# Patient Record
Sex: FEMALE | Race: WHITE | NOT HISPANIC OR LATINO | Employment: UNEMPLOYED | URBAN - METROPOLITAN AREA
[De-identification: names, ages, dates, MRNs, and addresses within clinical notes are randomized per-mention and may not be internally consistent; named-entity substitution may affect disease eponyms.]

---

## 2017-07-03 ENCOUNTER — OFFICE VISIT (OUTPATIENT)
Dept: FAMILY MEDICINE CLINIC | Facility: CLINIC | Age: 27
End: 2017-07-03

## 2017-07-03 VITALS
RESPIRATION RATE: 16 BRPM | HEIGHT: 62 IN | DIASTOLIC BLOOD PRESSURE: 82 MMHG | WEIGHT: 160 LBS | OXYGEN SATURATION: 99 % | BODY MASS INDEX: 29.44 KG/M2 | HEART RATE: 112 BPM | SYSTOLIC BLOOD PRESSURE: 118 MMHG

## 2017-07-03 DIAGNOSIS — K13.0 INFECTION OF LIP: Primary | ICD-10-CM

## 2017-07-03 PROCEDURE — 99213 OFFICE O/P EST LOW 20 MIN: CPT | Performed by: FAMILY MEDICINE

## 2017-07-03 NOTE — PROGRESS NOTES
"Subjective   Lorena Keen is a 27 y.o. female.     Oral Swelling   This is a recurrent (Lower Lip up occasionally swells up and occasionally draining some pus) problem. The current episode started more than 1 month ago. The problem occurs intermittently. The problem has been unchanged. Pertinent negatives include no chest pain, chills, congestion, coughing, fever, nausea, sore throat, swollen glands or vomiting. Exacerbated by: Recently had some wisdom teeth extracted and was told her other teeth are healthy. Treatments tried: vaseline lip balm. The treatment provided mild relief.        The following portions of the patient's history were reviewed and updated as appropriate: allergies, current medications, past social history and problem list.    Review of Systems   Constitutional: Negative for chills and fever.   HENT: Positive for facial swelling. Negative for congestion, mouth sores, nosebleeds, postnasal drip, sore throat, trouble swallowing and voice change.         No mucocele or openings of the Ted up or in the corner of the mouth were appreciated   Respiratory: Negative for cough and shortness of breath.    Cardiovascular: Negative for chest pain and palpitations.   Gastrointestinal: Negative for diarrhea, nausea and vomiting.   Skin: Negative.        Objective   /82  Pulse 112  Resp 16  Ht 62\" (157.5 cm)  Wt 160 lb (72.6 kg)  SpO2 99%  BMI 29.26 kg/m2  Physical Exam   Constitutional: She appears well-developed and well-nourished.   HENT:   Head: Atraumatic.   Nose: Nose normal.   Mouth/Throat: Oropharynx is clear and moist. No oropharyngeal exudate.   Careful evaluation of the lower lip revealed no discernible abnormality   Eyes: Conjunctivae are normal. Pupils are equal, round, and reactive to light.   Neck: Neck supple.   Cardiovascular: Normal rate, regular rhythm and normal heart sounds.    Pulmonary/Chest: Effort normal.   Lymphadenopathy:     She has no cervical adenopathy.   Nursing " note and vitals reviewed.      Assessment/Plan   Problem List Items Addressed This Visit     None      Visit Diagnoses     Infection of lip    -  Primary          New Medications Ordered This Visit   Medications   • mupirocin (BACTROBAN) 2 % ointment     Sig: Apply  topically 3 (Three) Times a Day.     Dispense:  30 g     Refill:  0   Trial of a topical antibiotic for 2 weeks if no recurrence continue to use it as directed.  If the lip swells up and drains again I would like her to come in that day for an evaluation.

## 2017-11-08 ENCOUNTER — OFFICE VISIT (OUTPATIENT)
Dept: FAMILY MEDICINE CLINIC | Facility: CLINIC | Age: 27
End: 2017-11-08

## 2017-11-08 VITALS
TEMPERATURE: 99.1 F | HEIGHT: 62 IN | OXYGEN SATURATION: 99 % | HEART RATE: 108 BPM | SYSTOLIC BLOOD PRESSURE: 104 MMHG | DIASTOLIC BLOOD PRESSURE: 72 MMHG | BODY MASS INDEX: 29.63 KG/M2 | WEIGHT: 161 LBS

## 2017-11-08 DIAGNOSIS — Z72.0 TOBACCO ABUSE: ICD-10-CM

## 2017-11-08 DIAGNOSIS — S29.019A THORACIC MYOFASCIAL STRAIN, INITIAL ENCOUNTER: Primary | ICD-10-CM

## 2017-11-08 PROCEDURE — 99406 BEHAV CHNG SMOKING 3-10 MIN: CPT | Performed by: FAMILY MEDICINE

## 2017-11-08 PROCEDURE — 99213 OFFICE O/P EST LOW 20 MIN: CPT | Performed by: FAMILY MEDICINE

## 2017-11-08 RX ORDER — PREDNISONE 20 MG/1
20 TABLET ORAL 2 TIMES DAILY
Qty: 10 TABLET | Refills: 0 | OUTPATIENT
Start: 2017-11-08 | End: 2020-01-26

## 2017-11-08 RX ORDER — CYCLOBENZAPRINE HCL 10 MG
10 TABLET ORAL 3 TIMES DAILY PRN
Qty: 30 TABLET | Refills: 0 | OUTPATIENT
Start: 2017-11-08 | End: 2020-01-26

## 2017-11-08 NOTE — PROGRESS NOTES
Subjective   Lorena Keen is a 27 y.o. female.  Pt is here due to upper back pain between shoulder blades sharp pain comes when taking a deep breath mostly on left side. Pain with any movement.   Pain 7/10    Back Pain   This is a new problem. The current episode started in the past 7 days. The problem occurs constantly. The problem has been waxing and waning since onset. The pain is present in the thoracic spine. The quality of the pain is described as aching and burning. The pain does not radiate. The pain is at a severity of 5/10. The pain is mild. The pain is worse during the day. The symptoms are aggravated by bending, coughing, lying down, position and twisting. Stiffness is present at night. Pertinent negatives include no abdominal pain, bladder incontinence, bowel incontinence, chest pain, dysuria, fever, headaches, leg pain, numbness, paresis, paresthesias, pelvic pain, perianal numbness, tingling, weakness or weight loss. Risk factors include recent trauma. She has tried NSAIDs for the symptoms. The treatment provided mild relief.    pain started upon lifting a cart.  Left side medial shoulder pain.  No numbness or weakness.  Nothing alleviates pain.  Worse with movement.  7/10 pain.  No soa.  No cough.      The following portions of the patient's history were reviewed and updated as appropriate: allergies, current medications, past family history, past medical history, past social history, past surgical history and problem list.    Review of Systems   Constitutional: Negative for fever and weight loss.   Cardiovascular: Negative for chest pain.   Gastrointestinal: Negative for abdominal pain and bowel incontinence.   Genitourinary: Negative for bladder incontinence, dysuria and pelvic pain.   Musculoskeletal: Positive for back pain.   Neurological: Negative for tingling, weakness, numbness, headaches and paresthesias.       Objective     Vitals:    11/08/17 0914   BP: 104/72   Pulse: 108   Temp: 99.1  "°F (37.3 °C)   SpO2: 99%   Weight: 161 lb (73 kg)   Height: 62\" (157.5 cm)       Physical Exam   Constitutional: She is oriented to person, place, and time. She appears well-developed and well-nourished.   HENT:   Head: Normocephalic and atraumatic.   Eyes: EOM are normal. Pupils are equal, round, and reactive to light. Right eye exhibits no discharge. Left eye exhibits no discharge.   Neck: Normal range of motion. Neck supple.   Cardiovascular: Normal rate, regular rhythm, normal heart sounds and intact distal pulses.    Pulmonary/Chest: Effort normal and breath sounds normal.   Abdominal: Soft. Bowel sounds are normal. She exhibits no mass. There is no tenderness.   Musculoskeletal: Normal range of motion. She exhibits tenderness.        Right shoulder: She exhibits no swelling.   She has left medial scapular muscle tenderness and pain with flexion of the neck full range of motion no nuchal rigidity.   Neurological: She is alert and oriented to person, place, and time. She has normal reflexes.   Skin: Skin is warm and dry. No cyanosis. Nails show no clubbing.   Psychiatric: She has a normal mood and affect. Her behavior is normal. Judgment and thought content normal.   Nursing note and vitals reviewed.      Assessment/Plan     Problem List Items Addressed This Visit        Musculoskeletal and Integument    Thoracic myofascial strain - Primary    Relevant Medications    predniSONE (DELTASONE) 20 MG tablet    cyclobenzaprine (FLEXERIL) 10 MG tablet       Other    Tobacco abuse        Approximately 5 minutes was spent counseling patient on risks and benefits of smoking cessation.  Options for cessation were discussed, including medications and alternative therapies.    Return to clinic in 2 weeks for flu and PNA 23 vaccine  I discussed stretching heat and range of motion medication risk benefits and side effects GI prophylaxis with the steroid sedation with the muscle relaxer follow-up if symptoms are not " improved.

## 2020-01-26 ENCOUNTER — APPOINTMENT (OUTPATIENT)
Dept: CT IMAGING | Facility: HOSPITAL | Age: 30
End: 2020-01-26

## 2020-01-26 ENCOUNTER — HOSPITAL ENCOUNTER (EMERGENCY)
Facility: HOSPITAL | Age: 30
Discharge: HOME | End: 2020-01-26
Attending: EMERGENCY MEDICINE | Admitting: EMERGENCY MEDICINE

## 2020-01-26 VITALS
TEMPERATURE: 98.9 F | RESPIRATION RATE: 20 BRPM | DIASTOLIC BLOOD PRESSURE: 73 MMHG | HEIGHT: 63 IN | BODY MASS INDEX: 29.95 KG/M2 | WEIGHT: 169 LBS | HEART RATE: 64 BPM | SYSTOLIC BLOOD PRESSURE: 121 MMHG | OXYGEN SATURATION: 100 %

## 2020-01-26 DIAGNOSIS — S00.03XA CONTUSION OF SCALP, INITIAL ENCOUNTER: ICD-10-CM

## 2020-01-26 DIAGNOSIS — R55 SYNCOPE AND COLLAPSE: Primary | ICD-10-CM

## 2020-01-26 LAB
ALBUMIN SERPL-MCNC: 4.8 G/DL (ref 3.5–5.2)
ALBUMIN/GLOB SERPL: 1.5 G/DL
ALP SERPL-CCNC: 89 U/L (ref 39–117)
ALT SERPL W P-5'-P-CCNC: 17 U/L (ref 1–33)
AMPHET+METHAMPHET UR QL: NEGATIVE
AMPHETAMINES UR QL: NEGATIVE
ANION GAP SERPL CALCULATED.3IONS-SCNC: 11 MMOL/L (ref 5–15)
AST SERPL-CCNC: 22 U/L (ref 1–32)
B-HCG UR QL: NEGATIVE
BARBITURATES UR QL SCN: NEGATIVE
BASOPHILS # BLD AUTO: 0.01 10*3/MM3 (ref 0–0.2)
BASOPHILS NFR BLD AUTO: 0.1 % (ref 0–1.5)
BENZODIAZ UR QL SCN: NEGATIVE
BILIRUB SERPL-MCNC: 0.5 MG/DL (ref 0.2–1.2)
BILIRUB UR QL STRIP: NEGATIVE
BUN BLD-MCNC: 9 MG/DL (ref 6–20)
BUN/CREAT SERPL: 11.5 (ref 7–25)
BUPRENORPHINE SERPL-MCNC: NEGATIVE NG/ML
CALCIUM SPEC-SCNC: 9.6 MG/DL (ref 8.6–10.5)
CANNABINOIDS SERPL QL: NEGATIVE
CHLORIDE SERPL-SCNC: 103 MMOL/L (ref 98–107)
CLARITY UR: CLEAR
CO2 SERPL-SCNC: 27 MMOL/L (ref 22–29)
COCAINE UR QL: NEGATIVE
COLOR UR: YELLOW
CREAT BLD-MCNC: 0.78 MG/DL (ref 0.57–1)
DEPRECATED RDW RBC AUTO: 40.2 FL (ref 37–54)
EOSINOPHIL # BLD AUTO: 0.01 10*3/MM3 (ref 0–0.4)
EOSINOPHIL NFR BLD AUTO: 0.1 % (ref 0.3–6.2)
ERYTHROCYTE [DISTWIDTH] IN BLOOD BY AUTOMATED COUNT: 12.1 % (ref 12.3–15.4)
GFR SERPL CREATININE-BSD FRML MDRD: 89 ML/MIN/1.73
GLOBULIN UR ELPH-MCNC: 3.3 GM/DL
GLUCOSE BLD-MCNC: 102 MG/DL (ref 65–99)
GLUCOSE BLDC GLUCOMTR-MCNC: 96 MG/DL (ref 70–130)
GLUCOSE UR STRIP-MCNC: NEGATIVE MG/DL
HCT VFR BLD AUTO: 41.7 % (ref 34–46.6)
HGB BLD-MCNC: 13.7 G/DL (ref 12–15.9)
HGB UR QL STRIP.AUTO: NEGATIVE
HOLD SPECIMEN: NORMAL
HOLD SPECIMEN: NORMAL
IMM GRANULOCYTES # BLD AUTO: 0.03 10*3/MM3 (ref 0–0.05)
IMM GRANULOCYTES NFR BLD AUTO: 0.4 % (ref 0–0.5)
INTERNAL NEGATIVE CONTROL: NEGATIVE
INTERNAL POSITIVE CONTROL: POSITIVE
KETONES UR QL STRIP: NEGATIVE
LEUKOCYTE ESTERASE UR QL STRIP.AUTO: NEGATIVE
LYMPHOCYTES # BLD AUTO: 1.1 10*3/MM3 (ref 0.7–3.1)
LYMPHOCYTES NFR BLD AUTO: 13 % (ref 19.6–45.3)
Lab: NORMAL
MCH RBC QN AUTO: 29.9 PG (ref 26.6–33)
MCHC RBC AUTO-ENTMCNC: 32.9 G/DL (ref 31.5–35.7)
MCV RBC AUTO: 91 FL (ref 79–97)
METHADONE UR QL SCN: NEGATIVE
MONOCYTES # BLD AUTO: 0.33 10*3/MM3 (ref 0.1–0.9)
MONOCYTES NFR BLD AUTO: 3.9 % (ref 5–12)
NEUTROPHILS # BLD AUTO: 7.01 10*3/MM3 (ref 1.7–7)
NEUTROPHILS NFR BLD AUTO: 82.5 % (ref 42.7–76)
NITRITE UR QL STRIP: NEGATIVE
NRBC BLD AUTO-RTO: 0 /100 WBC (ref 0–0.2)
OPIATES UR QL: NEGATIVE
OXYCODONE UR QL SCN: NEGATIVE
PCP UR QL SCN: NEGATIVE
PH UR STRIP.AUTO: 8.5 [PH] (ref 5–8)
PLATELET # BLD AUTO: 350 10*3/MM3 (ref 140–450)
PMV BLD AUTO: 8.8 FL (ref 6–12)
POTASSIUM BLD-SCNC: 3.5 MMOL/L (ref 3.5–5.2)
PROPOXYPH UR QL: NEGATIVE
PROT SERPL-MCNC: 8.1 G/DL (ref 6–8.5)
PROT UR QL STRIP: NEGATIVE
RBC # BLD AUTO: 4.58 10*6/MM3 (ref 3.77–5.28)
SODIUM BLD-SCNC: 141 MMOL/L (ref 136–145)
SP GR UR STRIP: 1.02 (ref 1–1.03)
TRICYCLICS UR QL SCN: NEGATIVE
UROBILINOGEN UR QL STRIP: ABNORMAL
WBC NRBC COR # BLD: 8.49 10*3/MM3 (ref 3.4–10.8)
WHOLE BLOOD HOLD SPECIMEN: NORMAL
WHOLE BLOOD HOLD SPECIMEN: NORMAL

## 2020-01-26 PROCEDURE — 81003 URINALYSIS AUTO W/O SCOPE: CPT | Performed by: EMERGENCY MEDICINE

## 2020-01-26 PROCEDURE — 93005 ELECTROCARDIOGRAM TRACING: CPT

## 2020-01-26 PROCEDURE — 81025 URINE PREGNANCY TEST: CPT | Performed by: EMERGENCY MEDICINE

## 2020-01-26 PROCEDURE — 99285 EMERGENCY DEPT VISIT HI MDM: CPT

## 2020-01-26 PROCEDURE — 82962 GLUCOSE BLOOD TEST: CPT

## 2020-01-26 PROCEDURE — 70450 CT HEAD/BRAIN W/O DYE: CPT

## 2020-01-26 PROCEDURE — 85025 COMPLETE CBC W/AUTO DIFF WBC: CPT | Performed by: EMERGENCY MEDICINE

## 2020-01-26 PROCEDURE — 80306 DRUG TEST PRSMV INSTRMNT: CPT | Performed by: NURSE PRACTITIONER

## 2020-01-26 PROCEDURE — 93005 ELECTROCARDIOGRAM TRACING: CPT | Performed by: EMERGENCY MEDICINE

## 2020-01-26 PROCEDURE — 80053 COMPREHEN METABOLIC PANEL: CPT | Performed by: EMERGENCY MEDICINE

## 2020-01-26 RX ORDER — SODIUM CHLORIDE 0.9 % (FLUSH) 0.9 %
10 SYRINGE (ML) INJECTION AS NEEDED
Status: DISCONTINUED | OUTPATIENT
Start: 2020-01-26 | End: 2020-01-26 | Stop reason: HOSPADM

## 2020-01-26 RX ADMIN — SODIUM CHLORIDE 1000 ML: 9 INJECTION, SOLUTION INTRAVENOUS at 12:09

## 2020-01-26 NOTE — DISCHARGE INSTRUCTIONS
Drink ample clear fluids.  Maintain your best hydration and nutrition.  Follow-up with a primary care provider to monitor your recovery.  Return to the emergency department as needed for worsening symptoms or concerns.  Thank you    Follow up with one of the [facility] Primary Care Providers below to setup primary care. If you need assistance coordinating a primary care appointment with a [facility] Primary Care Provider, please contact the Primary Care Coordinators at [phone] for appointment scheduling.    [facility], Primary Care   [address], [address]   [place] [address]  [phone]    [facility] Internal Medicine & Endocrinology  [address], [address]  [place] [address] [phone]    [facility] Family Medicine  [address], [address]   [place] [address] [phone]    [facility] Primary Care  [address], [address]  [place], [place] [id number] [phone]    [facility], Primary Care,   [address]   [place] [address]  [phone]    [facility] Primary [facility]  [address], [address]  [place], [place] [address] [phone]    [facility], Primary Care  [address], [address]  [place], [place] [id number] [phone]    [facility] Internal Medicine & Pediatrics  [address], [address]   [place] [address] [phone]    [facility], Primary Care  [address] [place] [address] [phone]      [facility] Primary Care  [address], [address]   [place] [address] [phone]    [facility]  [address] [place] [address] [phone]  Follow up with one of the physician centers below to setup primary  care.    [facility], [phone], [address], [address], [place], [facility][note 3]    [facility], [phone], [address], [address] [place] [address], [phone], [address] [place] [address];     [facility], [phone], [address] [place] [address]

## 2021-12-02 NOTE — ED PROVIDER NOTES
"Subjective   Patient presents to the emergency department today after an event of syncope.  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  Eventually, while trying to take a drink of the water fountain she felt \"everything go dark\".  Shortly thereafter she discovered she was lying on the floor, the back of her head tender from contusion.  She rhea to standing with her own strength and was attended to by coworkers.  This occurred approximately 6 AM.  She has no recollection of chest pain or shortness of breath.  She denies any substance use and admits to limited fluid intake, in hindsight.  She has no neurosensory complaints or focal weakness and complains only of \"feeling a little tired\"  and the tenderness to her scalp.      Syncope   Episode history:  Single  Most recent episode:  Today  Chronicity:  New  Context: normal activity    Context: not dehydration (possibly, but overtly), not medication change and not sitting down    Witnessed: no    Relieved by:  Lying down  Worsened by:  Posture  Ineffective treatments:  Drinking  Associated symptoms: dizziness and recent fall    Associated symptoms: no anxiety, no chest pain, no confusion, no diaphoresis, no difficulty breathing, no fever, no focal sensory loss, no focal weakness, no headaches, no malaise/fatigue, no nausea, no recent surgery and no weakness        Review of Systems   Constitutional: Negative for diaphoresis, fever and malaise/fatigue.   Cardiovascular: Positive for syncope. Negative for chest pain.   Gastrointestinal: Negative for nausea.   Neurological: Positive for dizziness and syncope. Negative for tremors, focal weakness, speech difficulty, weakness, light-headedness, numbness and headaches.   Psychiatric/Behavioral: Negative for confusion.   All other systems reviewed and are negative.      Past Medical " "History:   Diagnosis Date   • Anal fissure    • Carpal tunnel syndrome 5/24/2016   • Depression 5/24/2016   • Gastroesophageal reflux disease 5/24/2016   • Generalized anxiety disorder 5/24/2016   • Irregular uterine bleeding     Metorrhagia.Pt states she had been spotting for approx 2 weeks, but this has stopped. PT usually has cycle every 3 months.    • Irritable bowel syndrome 5/24/2016   • Migraine 5/24/2016       Allergies   Allergen Reactions   • Fluoxetine Other (See Comments)     STATES \"THEY MAKE ME WANT TO KILL MYSELF\" WORSE DEPRESSION   • Sulfa Antibiotics Hives     Sulfa Drugs   • Trazodone Other (See Comments)     TraZODone HCl TABS  LOSS OF SHORT TERM MEMORY        History reviewed. No pertinent surgical history.    Family History   Problem Relation Age of Onset   • Diabetes Father    • Cancer Other        Social History     Socioeconomic History   • Marital status: Single     Spouse name: Not on file   • Number of children: Not on file   • Years of education: Not on file   • Highest education level: Not on file   Tobacco Use   • Smoking status: Current Every Day Smoker     Packs/day: 0.25     Types: Cigarettes   • Smokeless tobacco: Never Used   • Tobacco comment: 1/27/2016: Former Smoker   Substance and Sexual Activity   • Alcohol use: No   • Drug use: No           Objective   Physical Exam   Constitutional: She is oriented to person, place, and time. She appears well-developed and well-nourished. No distress.   Patient is well-appearing with normal vital signs.  She is an excellent historian.   HENT:   Head: Normocephalic and atraumatic.   Mouth/Throat: Oropharynx is clear and moist.   Eyes: Pupils are equal, round, and reactive to light. Conjunctivae are normal.   Neck: Normal range of motion. Neck supple.   Cardiovascular: Normal rate and regular rhythm.   Pulmonary/Chest: Effort normal and breath sounds normal. No respiratory distress. She has no wheezes. She has no rales.   Abdominal: Soft. " Bowel sounds are normal. She exhibits no distension. There is no rebound and no guarding.   Musculoskeletal: Normal range of motion. She exhibits no edema.   Neurological: She is alert and oriented to person, place, and time. No sensory deficit. Coordination normal.   Skin: Skin is warm and dry. Capillary refill takes less than 2 seconds. No rash noted. She is not diaphoretic. No erythema. No pallor.   Psychiatric: She has a normal mood and affect. Her behavior is normal. Judgment and thought content normal.   Nursing note and vitals reviewed.      Procedures           ED Course  ED Course as of Jan 26 1415   Sun Jan 26, 2020   1404 Patient's evaluation is reassuring.  She has had normal vital signs throughout her ED course.    [MS]      ED Course User Index  [MS] Herlinda Moreno APRN            Recent Results (from the past 24 hour(s))   POC Glucose Once    Collection Time: 01/26/20 10:53 AM   Result Value Ref Range    Glucose 96 70 - 130 mg/dL   Comprehensive Metabolic Panel    Collection Time: 01/26/20 10:55 AM   Result Value Ref Range    Glucose 102 (H) 65 - 99 mg/dL    BUN 9 6 - 20 mg/dL    Creatinine 0.78 0.57 - 1.00 mg/dL    Sodium 141 136 - 145 mmol/L    Potassium 3.5 3.5 - 5.2 mmol/L    Chloride 103 98 - 107 mmol/L    CO2 27.0 22.0 - 29.0 mmol/L    Calcium 9.6 8.6 - 10.5 mg/dL    Total Protein 8.1 6.0 - 8.5 g/dL    Albumin 4.80 3.50 - 5.20 g/dL    ALT (SGPT) 17 1 - 33 U/L    AST (SGOT) 22 1 - 32 U/L    Alkaline Phosphatase 89 39 - 117 U/L    Total Bilirubin 0.5 0.2 - 1.2 mg/dL    eGFR Non African Amer 89 >60 mL/min/1.73    Globulin 3.3 gm/dL    A/G Ratio 1.5 g/dL    BUN/Creatinine Ratio 11.5 7.0 - 25.0    Anion Gap 11.0 5.0 - 15.0 mmol/L   Light Blue Top    Collection Time: 01/26/20 10:55 AM   Result Value Ref Range    Extra Tube hold for add-on    Green Top (Gel)    Collection Time: 01/26/20 10:55 AM   Result Value Ref Range    Extra Tube Hold for add-ons.    Lavender Top    Collection Time: 01/26/20  10:55 AM   Result Value Ref Range    Extra Tube hold for add-on    Gold Top - SST    Collection Time: 01/26/20 10:55 AM   Result Value Ref Range    Extra Tube Hold for add-ons.    CBC Auto Differential    Collection Time: 01/26/20 10:55 AM   Result Value Ref Range    WBC 8.49 3.40 - 10.80 10*3/mm3    RBC 4.58 3.77 - 5.28 10*6/mm3    Hemoglobin 13.7 12.0 - 15.9 g/dL    Hematocrit 41.7 34.0 - 46.6 %    MCV 91.0 79.0 - 97.0 fL    MCH 29.9 26.6 - 33.0 pg    MCHC 32.9 31.5 - 35.7 g/dL    RDW 12.1 (L) 12.3 - 15.4 %    RDW-SD 40.2 37.0 - 54.0 fl    MPV 8.8 6.0 - 12.0 fL    Platelets 350 140 - 450 10*3/mm3    Neutrophil % 82.5 (H) 42.7 - 76.0 %    Lymphocyte % 13.0 (L) 19.6 - 45.3 %    Monocyte % 3.9 (L) 5.0 - 12.0 %    Eosinophil % 0.1 (L) 0.3 - 6.2 %    Basophil % 0.1 0.0 - 1.5 %    Immature Grans % 0.4 0.0 - 0.5 %    Neutrophils, Absolute 7.01 (H) 1.70 - 7.00 10*3/mm3    Lymphocytes, Absolute 1.10 0.70 - 3.10 10*3/mm3    Monocytes, Absolute 0.33 0.10 - 0.90 10*3/mm3    Eosinophils, Absolute 0.01 0.00 - 0.40 10*3/mm3    Basophils, Absolute 0.01 0.00 - 0.20 10*3/mm3    Immature Grans, Absolute 0.03 0.00 - 0.05 10*3/mm3    nRBC 0.0 0.0 - 0.2 /100 WBC   Urinalysis With Microscopic If Indicated (No Culture) - Urine, Clean Catch    Collection Time: 01/26/20 11:03 AM   Result Value Ref Range    Color, UA Yellow Yellow, Straw    Appearance, UA Clear Clear    pH, UA 8.5 (H) 5.0 - 8.0    Specific Gravity, UA 1.016 1.001 - 1.030    Glucose, UA Negative Negative    Ketones, UA Negative Negative    Bilirubin, UA Negative Negative    Blood, UA Negative Negative    Protein, UA Negative Negative    Leuk Esterase, UA Negative Negative    Nitrite, UA Negative Negative    Urobilinogen, UA 0.2 E.U./dL 0.2 - 1.0 E.U./dL   Urine Drug Screen - Urine, Clean Catch    Collection Time: 01/26/20 11:03 AM   Result Value Ref Range    THC, Screen, Urine Negative Negative    Phencyclidine (PCP), Urine Negative Negative    Cocaine Screen, Urine Negative  Back Pain Negative    Methamphetamine, Ur Negative Negative    Opiate Screen Negative Negative    Amphetamine Screen, Urine Negative Negative    Benzodiazepine Screen, Urine Negative Negative    Tricyclic Antidepressants Screen Negative Negative    Methadone Screen, Urine Negative Negative    Barbiturates Screen, Urine Negative Negative    Oxycodone Screen, Urine Negative Negative    Propoxyphene Screen Negative Negative    Buprenorphine, Screen, Urine Negative Negative   POCT pregnancy, urine    Collection Time: 01/26/20 11:09 AM   Result Value Ref Range    HCG, Urine, QL Negative Negative    Lot Number [id number]     Internal Positive Control Positive     Internal Negative Control Negative      Note: In addition to lab results from this visit, the labs listed above may include labs taken at another facility or during a different encounter within the last 24 hours. Please correlate lab times with ED admission and discharge times for further clarification of the services performed during this visit.    CT Head Without Contrast   Preliminary Result   No acute intracranial abnormality. Specifically, no acute   intracranial hemorrhage.                Vitals:    01/26/20 1208 01/26/20 1231 01/26/20 1232 01/26/20 1248   BP: 105/69 116/79     Patient Position:       Pulse: 69  60 64   Resp:    20   Temp:       TempSrc:       SpO2: 100%  100% 100%   Weight:       Height:         Medications   sodium chloride 0.9 % flush 10 mL (has no administration in time range)   sodium chloride 0.9 % bolus 1,000 mL (1,000 mL Intravenous New Bag 1/26/20 1209)     ECG/EMG Results (last 24 hours)     Procedure Component Value Units Date/Time    ECG 12 Lead [[id number]] Collected:  01/26/20 1050     Updated:  01/26/20 1049        ECG 12 Lead                                                   MDM    Final diagnoses:   Syncope and collapse   Contusion of scalp, initial encounter            Herlinda Moreno, FALGUNI  01/26/20 1413     Psych/Behavioral

## 2022-04-22 ENCOUNTER — INITIAL PRENATAL (OUTPATIENT)
Dept: OBSTETRICS AND GYNECOLOGY | Facility: CLINIC | Age: 32
End: 2022-04-22

## 2022-04-22 VITALS — WEIGHT: 202 LBS | BODY MASS INDEX: 36.36 KG/M2 | SYSTOLIC BLOOD PRESSURE: 112 MMHG | DIASTOLIC BLOOD PRESSURE: 72 MMHG

## 2022-04-22 DIAGNOSIS — Z12.4 SCREENING FOR MALIGNANT NEOPLASM OF CERVIX: ICD-10-CM

## 2022-04-22 DIAGNOSIS — Z34.91 PRENATAL CARE IN FIRST TRIMESTER: Primary | ICD-10-CM

## 2022-04-22 PROCEDURE — 99204 OFFICE O/P NEW MOD 45 MIN: CPT | Performed by: OBSTETRICS & GYNECOLOGY

## 2022-04-22 RX ORDER — PRENATAL VIT NO.126/IRON/FOLIC 28MG-0.8MG
TABLET ORAL DAILY
COMMUNITY
End: 2022-12-05

## 2022-05-02 NOTE — PROGRESS NOTES
New Pregnancy Visit    Subjective   Chief Complaint   Patient presents with   • Initial Prenatal Visit     LMP 22, TVS done today 12w6d, has never had pap smear.        Lorena Keen is a 32 y.o. year old .  Patient's last menstrual period was 2022.  She presents to initiate prenatal care with our group today.     First pregnancy.  No specific complaints.    Social History    Tobacco Use      Smoking status: Current Every Day Smoker        Packs/day: 0.25        Types: Cigarettes      Smokeless tobacco: Never Used      Tobacco comment: 2016: Former Smoker      Current Outpatient Medications on File Prior to Visit   Medication Sig Dispense Refill   • prenatal vitamin (prenatal, CLASSIC, vitamin) tablet Take  by mouth Daily.       No current facility-administered medications on file prior to visit.          Objective   /72   Wt 91.6 kg (202 lb)   LMP 2022   BMI 36.36 kg/m²   Physical Exam:  Normal, gestational age-appropriate exam today        Medical Decision Making:    Lab Review:   No data reviewed    Note Review:  None    Imaging Review:  Pelvic ultrasound report   IUP measuring 12+6 weeks with appropriate fetal cardiac activity.  SALEEM is set at 10/29/2022.  The cervix and bilateral ovaries are normal in appearance. No free fluid in the cul-de-sac.  Assessment   1. IUP at 12+6 weeks  2. Supervision of low risk pregnancy   3. Screening for malignancy of the cervix     Plan    1. The problem list for pregnancy was initiated today  2. Tests/Orders/Rx for today:  Orders Placed This Encounter   Procedures   • OB Panel With HIV     Order Specific Question:   Release to patient     Answer:   Immediate       Medication Management: none    3. Testing for GC / Chlamydia / trichomonas was done today + pap smear  4. Genetic testing reviewed: she will consider the information and make a decision at a later date.  5. Information reviewed: exercise in pregnancy, nutrition in pregnancy,  weight gain in pregnancy, work and travel restrictions during pregnancy, list of OTC medications acceptable in pregnancy and call coverage groups    Follow up: 4 week(s)    Sim Logan MD  Obstetrics and Gynecology  [facility]

## 2022-05-06 DIAGNOSIS — Z12.4 SCREENING FOR MALIGNANT NEOPLASM OF CERVIX: ICD-10-CM

## 2022-05-06 DIAGNOSIS — Z34.91 PRENATAL CARE IN FIRST TRIMESTER: ICD-10-CM

## 2022-05-23 ENCOUNTER — ROUTINE PRENATAL (OUTPATIENT)
Dept: OBSTETRICS AND GYNECOLOGY | Facility: CLINIC | Age: 32
End: 2022-05-23

## 2022-05-23 VITALS — WEIGHT: 206.6 LBS | SYSTOLIC BLOOD PRESSURE: 122 MMHG | BODY MASS INDEX: 37.19 KG/M2 | DIASTOLIC BLOOD PRESSURE: 76 MMHG

## 2022-05-23 DIAGNOSIS — Z34.92 SECOND TRIMESTER PREGNANCY: Primary | ICD-10-CM

## 2022-05-23 PROCEDURE — 99213 OFFICE O/P EST LOW 20 MIN: CPT | Performed by: OBSTETRICS & GYNECOLOGY

## 2022-05-23 RX ORDER — METOCLOPRAMIDE 10 MG/1
10 TABLET ORAL 3 TIMES DAILY PRN
Qty: 90 TABLET | Refills: 3 | Status: SHIPPED | OUTPATIENT
Start: 2022-05-23 | End: 2022-10-26 | Stop reason: HOSPADM

## 2022-05-23 RX ORDER — DOCUSATE SODIUM 100 MG/1
100 CAPSULE, LIQUID FILLED ORAL 2 TIMES DAILY PRN
Qty: 60 CAPSULE | Refills: 6 | Status: SHIPPED | OUTPATIENT
Start: 2022-05-23 | End: 2022-12-05

## 2022-05-23 NOTE — PROGRESS NOTES
Chief Complaint   Patient presents with   • Routine Prenatal Visit     Prenatal visit with no problems or concerns.        HPI:   , 17w2d gestation reports doing well    ROS:  See Prenatal Episode/Flowsheet  /76   Wt 93.7 kg (206 lb 9.6 oz)   LMP 2022   BMI 37.19 kg/m²      EXAM:  EXTREMITIES:  No swelling-See Prenatal Episode/Flowsheet    ABDOMEN:  FHTs/Movement noted-See Prenatal Episode/Flowsheet    URINE GLUCOSE/PROTEIN:  See Prenatal Episode/Flowsheet    PELVIC EXAM:  See Prenatal Episode/Flowsheet  CV:  Lungs:  GYN:    MDM:    Lab Results   Component Value Date    HGB 13.7 2020       U/S:US Ob < 14 Weeks Single or First Gestation (2022 13:51)      1. IUP 17w2d  2. Routine care   3. PNL's and quad screen today  4. Anatomic U/S next time

## 2022-05-24 LAB
2ND TRIMESTER 4 SCREEN SERPL-IMP: NORMAL
ABO GROUP BLD: ABNORMAL
AFP ADJ MOM SERPL: 0.8
AFP SERPL-MCNC: 27.4 NG/ML
AGE AT DELIVERY: 30.6 YR
BASOPHILS # BLD AUTO: 0 X10E3/UL (ref 0–0.2)
BASOPHILS NFR BLD AUTO: 0 %
BLD GP AB SCN SERPL QL: NEGATIVE
EOSINOPHIL # BLD AUTO: 0.1 X10E3/UL (ref 0–0.4)
EOSINOPHIL NFR BLD AUTO: 1 %
ERYTHROCYTE [DISTWIDTH] IN BLOOD BY AUTOMATED COUNT: 12.5 % (ref 11.7–15.4)
FET TS 18 RISK FROM MAT AGE: NORMAL
FET TS 21 RISK FROM MAT AGE: 648
GA METHOD: NORMAL
GA: 17.3 WEEKS
HBV SURFACE AG SERPL QL IA: NEGATIVE
HCG ADJ MOM SERPL: 1.84
HCG SERPL-ACNC: NORMAL MIU/ML
HCT VFR BLD AUTO: 34.2 % (ref 34–46.6)
HCV AB S/CO SERPL IA: <0.1 S/CO RATIO (ref 0–0.9)
HGB BLD-MCNC: 11.7 G/DL (ref 11.1–15.9)
HIV 1+2 AB+HIV1 P24 AG SERPL QL IA: NON REACTIVE
IDDM PATIENT QL: NO
IMM GRANULOCYTES # BLD AUTO: 0 X10E3/UL (ref 0–0.1)
IMM GRANULOCYTES NFR BLD AUTO: 0 %
INHIBIN A ADJ MOM SERPL: 1.47
INHIBIN A SERPL-MCNC: 184.99 PG/ML
LYMPHOCYTES # BLD AUTO: 1.3 X10E3/UL (ref 0.7–3.1)
LYMPHOCYTES NFR BLD AUTO: 12 %
MCH RBC QN AUTO: 29.8 PG (ref 26.6–33)
MCHC RBC AUTO-ENTMCNC: 34.2 G/DL (ref 31.5–35.7)
MCV RBC AUTO: 87 FL (ref 79–97)
MONOCYTES # BLD AUTO: 0.5 X10E3/UL (ref 0.1–0.9)
MONOCYTES NFR BLD AUTO: 5 %
MULTIPLE PREGNANCY: NO
NEURAL TUBE DEFECT RISK FETUS: NORMAL %
NEUTROPHILS # BLD AUTO: 8.9 X10E3/UL (ref 1.4–7)
NEUTROPHILS NFR BLD AUTO: 82 %
PLATELET # BLD AUTO: 340 X10E3/UL (ref 150–450)
RBC # BLD AUTO: 3.92 X10E6/UL (ref 3.77–5.28)
RH BLD: POSITIVE
RPR SER QL: NON REACTIVE
RUBV IGG SERPL IA-ACNC: 2.66 INDEX
SERVICE CMNT-IMP: NORMAL
TS 18 RISK FETUS: NORMAL
TS 21 RISK FETUS: 671
U ESTRIOL ADJ MOM SERPL: 1.07
U ESTRIOL SERPL-MCNC: 1.18 NG/ML
WBC # BLD AUTO: 10.8 X10E3/UL (ref 3.4–10.8)

## 2022-06-09 ENCOUNTER — ROUTINE PRENATAL (OUTPATIENT)
Dept: OBSTETRICS AND GYNECOLOGY | Facility: CLINIC | Age: 32
End: 2022-06-09

## 2022-06-09 VITALS — SYSTOLIC BLOOD PRESSURE: 124 MMHG | BODY MASS INDEX: 37.91 KG/M2 | DIASTOLIC BLOOD PRESSURE: 62 MMHG | WEIGHT: 210.6 LBS

## 2022-06-09 DIAGNOSIS — Z36.89 ENCOUNTER FOR FETAL ANATOMIC SURVEY: Primary | ICD-10-CM

## 2022-06-09 PROCEDURE — 99213 OFFICE O/P EST LOW 20 MIN: CPT | Performed by: OBSTETRICS & GYNECOLOGY

## 2022-06-09 RX ORDER — LANCETS 28 GAUGE
EACH MISCELLANEOUS
Qty: 120 EACH | Refills: 12 | Status: SHIPPED | OUTPATIENT
Start: 2022-06-09 | End: 2022-08-01

## 2022-06-09 RX ORDER — BLOOD-GLUCOSE METER
KIT MISCELLANEOUS
Qty: 1 EACH | Refills: 0 | Status: SHIPPED | OUTPATIENT
Start: 2022-06-09 | End: 2022-08-01

## 2022-07-05 ENCOUNTER — ROUTINE PRENATAL (OUTPATIENT)
Dept: OBSTETRICS AND GYNECOLOGY | Facility: CLINIC | Age: 32
End: 2022-07-05

## 2022-07-05 VITALS — SYSTOLIC BLOOD PRESSURE: 108 MMHG | WEIGHT: 212 LBS | BODY MASS INDEX: 38.16 KG/M2 | DIASTOLIC BLOOD PRESSURE: 68 MMHG

## 2022-07-05 DIAGNOSIS — Z34.92 PRENATAL CARE IN SECOND TRIMESTER: Primary | ICD-10-CM

## 2022-07-05 DIAGNOSIS — K21.9 GASTROESOPHAGEAL REFLUX DISEASE WITHOUT ESOPHAGITIS: ICD-10-CM

## 2022-07-05 PROCEDURE — 99213 OFFICE O/P EST LOW 20 MIN: CPT | Performed by: OBSTETRICS & GYNECOLOGY

## 2022-07-05 RX ORDER — FAMOTIDINE 20 MG/1
20 TABLET, FILM COATED ORAL DAILY
Qty: 30 TABLET | Refills: 5 | Status: SHIPPED | OUTPATIENT
Start: 2022-07-05 | End: 2022-12-05

## 2022-07-05 RX ORDER — BLOOD-GLUCOSE METER
KIT MISCELLANEOUS
COMMUNITY
Start: 2022-06-09 | End: 2022-08-01

## 2022-07-05 NOTE — PROGRESS NOTES
Prenatal Care Visit    Subjective   Chief Complaint   Patient presents with   • Routine Prenatal Visit     Follow up anatomy scan done today, nausea.       History:   Lorena is a  currently at 23w3d who presents for a prenatal care visit today.    No major issues.    Social History    Tobacco Use      Smoking status: Current Every Day Smoker        Packs/day: 0.25        Types: Cigarettes      Smokeless tobacco: Never Used      Tobacco comment: 2016: Former Smoker       Objective   /68   Wt 96.2 kg (212 lb)   LMP 2022   BMI 38.16 kg/m²   Physical Exam:  Normal, gestational age-appropriate exam today        Plan   Medical Decision Making:    I have reviewed the prenatal labs and ultrasound(s) today. I have reviewed the most recent prenatal progress note(s).    Diagnosis: Supervision of low risk pregnancy   IBS  GERD   Tests/Orders/Rx today: No orders of the defined types were placed in this encounter.      Medication Management: Pepcid     Topics discussed: Prenatal care milestones  Will check FSBG values at home instead of Glucola  Pepcid for GERD   Tests next visit: none   Next visit: 3 week(s)     Sim Logan MD  Obstetrics and Gynecology  [facility]

## 2022-08-01 ENCOUNTER — ROUTINE PRENATAL (OUTPATIENT)
Dept: OBSTETRICS AND GYNECOLOGY | Facility: CLINIC | Age: 32
End: 2022-08-01

## 2022-08-01 VITALS — DIASTOLIC BLOOD PRESSURE: 80 MMHG | SYSTOLIC BLOOD PRESSURE: 122 MMHG | WEIGHT: 216 LBS | BODY MASS INDEX: 38.88 KG/M2

## 2022-08-01 DIAGNOSIS — Z34.02 ENCOUNTER FOR SUPERVISION OF NORMAL FIRST PREGNANCY IN SECOND TRIMESTER: Primary | ICD-10-CM

## 2022-08-01 PROCEDURE — 99213 OFFICE O/P EST LOW 20 MIN: CPT | Performed by: MIDWIFE

## 2022-08-01 NOTE — PROGRESS NOTES
Chief Complaint   Patient presents with   • Routine Prenatal Visit     No Complaints/concerns         HPI: Lorena is a  currently at 27w2d here for prenatal visit who reports the following:  Baby is active. She didn't do Glucola due to IBS. She has been doing FBS and 1 hr pp for 2 weeks. FBS , 1 hr pp B < 121, L < 136, D <114. She didn't change her diet.                EXAM:     Vitals:    22 1310   BP: 122/80      Abdomen:   See prenatal flowsheet as noted and reviewed, soft, nontender   Pelvic:  See prenatal flowsheet as noted and reviewed   Urine:  See prenatal flowsheet as noted and reviewed    Lab Results   Component Value Date    ABO O 2022    RH Positive 2022    ABSCRN Negative 2022       MDM:  Impression: Supervision of low risk pregnancy   Tests done today: none   Topics discussed: kick counts and fetal movement  Continue fingersticks, decrease carbs in diet  Reviewed OB labs   Tests next visit: none                RTO:                        2 weeks    This note was electronically signed.  Africa Camilo, FALGUNI  2022

## 2022-08-15 ENCOUNTER — ROUTINE PRENATAL (OUTPATIENT)
Dept: OBSTETRICS AND GYNECOLOGY | Facility: CLINIC | Age: 32
End: 2022-08-15

## 2022-08-15 VITALS — WEIGHT: 216 LBS | SYSTOLIC BLOOD PRESSURE: 110 MMHG | BODY MASS INDEX: 38.88 KG/M2 | DIASTOLIC BLOOD PRESSURE: 62 MMHG

## 2022-08-15 DIAGNOSIS — K59.00 CONSTIPATION, UNSPECIFIED CONSTIPATION TYPE: ICD-10-CM

## 2022-08-15 DIAGNOSIS — O24.410 GDM (GESTATIONAL DIABETES MELLITUS), CLASS A1: ICD-10-CM

## 2022-08-15 DIAGNOSIS — Z34.93 PRENATAL CARE IN THIRD TRIMESTER: Primary | ICD-10-CM

## 2022-08-15 PROCEDURE — 99213 OFFICE O/P EST LOW 20 MIN: CPT | Performed by: OBSTETRICS & GYNECOLOGY

## 2022-08-15 RX ORDER — ALUMINUM ZIRCONIUM OCTACHLOROHYDREX GLY 16 G/100G
GEL TOPICAL DAILY
Qty: 174 G | Refills: 12 | Status: SHIPPED | OUTPATIENT
Start: 2022-08-15 | End: 2022-10-26 | Stop reason: HOSPADM

## 2022-08-15 RX ORDER — LANCETS
EACH MISCELLANEOUS
Qty: 100 EACH | Refills: 12 | Status: SHIPPED | OUTPATIENT
Start: 2022-08-15 | End: 2022-10-26 | Stop reason: HOSPADM

## 2022-08-15 NOTE — PROGRESS NOTES
Prenatal Care Visit    Subjective   Chief Complaint   Patient presents with   • Routine Prenatal Visit     No complaints       History:   Lorena is a  currently at 29w2d who presents for a prenatal care visit today.    No major issues.    Social History    Tobacco Use      Smoking status: Current Every Day Smoker        Packs/day: 0.25        Types: Cigarettes      Smokeless tobacco: Never Used      Tobacco comment: 2016: Former Smoker       Objective   /62   Wt 98 kg (216 lb)   LMP 2022   BMI 38.88 kg/m²   Physical Exam:  Normal, gestational age-appropriate exam today        Plan   Medical Decision Making:    I have reviewed the prenatal labs and ultrasound(s) today. I have reviewed the most recent prenatal progress note(s).    Diagnosis: Supervision of low risk pregnancy   IBS  GERD   Tests/Orders/Rx today: No orders of the defined types were placed in this encounter.      Medication Management: Metamucil daily     Topics discussed: Prenatal care milestones  Will check FSBG values at home instead of Glucola, elevated fasting values about 50% of the days, normal PP values, continue checking values for now  Constipation + Metamucil   Tests next visit: U/S for EFW   Next visit: 3 week(s)     Sim Logan MD  Obstetrics and Gynecology  [facility]

## 2022-09-07 ENCOUNTER — ROUTINE PRENATAL (OUTPATIENT)
Dept: OBSTETRICS AND GYNECOLOGY | Facility: CLINIC | Age: 32
End: 2022-09-07

## 2022-09-07 VITALS — DIASTOLIC BLOOD PRESSURE: 70 MMHG | WEIGHT: 217.4 LBS | BODY MASS INDEX: 39.13 KG/M2 | SYSTOLIC BLOOD PRESSURE: 116 MMHG

## 2022-09-07 DIAGNOSIS — Z36.89 ENCOUNTER FOR ULTRASOUND TO ASSESS FETAL GROWTH: Primary | ICD-10-CM

## 2022-09-07 PROCEDURE — 99213 OFFICE O/P EST LOW 20 MIN: CPT | Performed by: OBSTETRICS & GYNECOLOGY

## 2022-09-07 NOTE — PROGRESS NOTES
Chief Complaint   Patient presents with   • Routine Prenatal Visit     Prenatal visit with Growth scan done today. No problems or concerns        HPI:   , 32w4d gestation reports doing well    ROS:  See Prenatal Episode/Flowsheet  /70   Wt 98.6 kg (217 lb 6.4 oz)   LMP 2022   BMI 39.13 kg/m²      EXAM:  EXTREMITIES:  No swelling-See Prenatal Episode/Flowsheet    ABDOMEN:  FHTs/Movement noted-See Prenatal Episode/Flowsheet    URINE GLUCOSE/PROTEIN:  See Prenatal Episode/Flowsheet    PELVIC EXAM:  See Prenatal Episode/Flowsheet  CV:  Lungs:  GYN:    MDM:    Lab Results   Component Value Date    HGB 11.7 2022    RUBELLAABIGG 2.66 2022    HEPBSAG Negative 2022    ABO O 2022    RH Positive 2022    ABSCRN Negative 2022    [id number] Non Reactive 2022    HEPCVIRUSABY <0.1 2022       U/S: Overall growth 62.6 percentile.  ASHLEY 18.3.  Vertex.  Anterior placenta.  Active fetus    1. IUP 32w4d  2. Routine care   3.  Normal growth ultrasound.  4.  Fingerstick blood sugars within normal limits.  May discontinue.  CBC today

## 2022-09-08 LAB
ERYTHROCYTE [DISTWIDTH] IN BLOOD BY AUTOMATED COUNT: 13.4 % (ref 12.3–15.4)
HCT VFR BLD AUTO: 35.8 % (ref 34–46.6)
HGB BLD-MCNC: 11.7 G/DL (ref 12–15.9)
MCH RBC QN AUTO: 28.6 PG (ref 26.6–33)
MCHC RBC AUTO-ENTMCNC: 32.7 G/DL (ref 31.5–35.7)
MCV RBC AUTO: 87.5 FL (ref 79–97)
PLATELET # BLD AUTO: 335 10*3/MM3 (ref 140–450)
RBC # BLD AUTO: 4.09 10*6/MM3 (ref 3.77–5.28)
WBC # BLD AUTO: 10.22 10*3/MM3 (ref 3.4–10.8)

## 2022-09-21 ENCOUNTER — ROUTINE PRENATAL (OUTPATIENT)
Dept: OBSTETRICS AND GYNECOLOGY | Facility: CLINIC | Age: 32
End: 2022-09-21

## 2022-09-21 VITALS — BODY MASS INDEX: 39.24 KG/M2 | DIASTOLIC BLOOD PRESSURE: 70 MMHG | WEIGHT: 218 LBS | SYSTOLIC BLOOD PRESSURE: 112 MMHG

## 2022-09-21 DIAGNOSIS — Z34.03 NORMAL FIRST PREGNANCY IN THIRD TRIMESTER: Primary | ICD-10-CM

## 2022-09-21 PROCEDURE — 99213 OFFICE O/P EST LOW 20 MIN: CPT | Performed by: STUDENT IN AN ORGANIZED HEALTH CARE EDUCATION/TRAINING PROGRAM

## 2022-09-21 NOTE — PROGRESS NOTES
Prenatal Care Visit    Subjective   Chief Complaint   Patient presents with   • Routine Prenatal Visit     No Complaints/concerns      History:   Lorena is a  currently at 34w4d who presents for a prenatal care visit today.    Denies CTX, VB, LOF. Reports (+) FM.       Objective   /70   Wt 98.9 kg (218 lb)   LMP 2022   BMI 39.24 kg/m²   Physical Exam:  Normal, gestational age-appropriate exam today        Assessment & Plan     1. IUP @ 34w4d  2. Routine care: I have reviewed the prenatal labs and ultrasound(s) today. I have reviewed the most recent prenatal progress note(s). Labs up to date. Plan GBS at next visit.     Diagnosis Plan   1. Normal first pregnancy in third trimester          Medication Management: continue medications as prescribed    Topics discussed: Prenatal care milestones   labor signs and symptoms   Tests next visit: GBS testing   Next visit: 2 week(s)     Lizbeth Padron MD  Obstetrics and Gynecology  [facility]

## 2022-10-12 ENCOUNTER — ROUTINE PRENATAL (OUTPATIENT)
Dept: OBSTETRICS AND GYNECOLOGY | Facility: CLINIC | Age: 32
End: 2022-10-12

## 2022-10-12 VITALS — BODY MASS INDEX: 40.86 KG/M2 | SYSTOLIC BLOOD PRESSURE: 124 MMHG | WEIGHT: 227 LBS | DIASTOLIC BLOOD PRESSURE: 74 MMHG

## 2022-10-12 DIAGNOSIS — Z3A.37 37 WEEKS GESTATION OF PREGNANCY: Primary | ICD-10-CM

## 2022-10-12 PROBLEM — O24.410 GDM (GESTATIONAL DIABETES MELLITUS), CLASS A1: Status: RESOLVED | Noted: 2022-08-15 | Resolved: 2022-10-12

## 2022-10-12 PROCEDURE — 99213 OFFICE O/P EST LOW 20 MIN: CPT | Performed by: STUDENT IN AN ORGANIZED HEALTH CARE EDUCATION/TRAINING PROGRAM

## 2022-10-12 NOTE — PROGRESS NOTES
Prenatal Care Visit    Subjective   Chief Complaint   Patient presents with   • Routine Prenatal Visit     No Complaints/concerns      History:   Lorena is a  currently at 37w4d who presents for a prenatal care visit today.    Reports intermittent CTX. Denies VB, LOF. Reports good FM.     Objective   /74   Wt 103 kg (227 lb)   LMP 2022   BMI 40.86 kg/m²   Physical Exam:  Normal, gestational age-appropriate exam today      Assessment & Plan     1. IUP @ 37w4d  2. Routine care: I have reviewed the prenatal labs and ultrasound(s) today. I have reviewed the most recent prenatal progress note(s). GBS done today.     Diagnosis Plan   1. 37 weeks gestation of pregnancy  Group B Streptococcus Culture - Swab, Vaginal/Rectum         Medication Management: None    Topics discussed: Prenatal care milestones  Kick counts and fetal movement  Labor signs and symptoms   Tests next visit: none   Next visit: 1 week(s)     Lizbeth Padron MD  Obstetrics and Gynecology  [facility]

## 2022-10-17 ENCOUNTER — ROUTINE PRENATAL (OUTPATIENT)
Dept: OBSTETRICS AND GYNECOLOGY | Facility: CLINIC | Age: 32
End: 2022-10-17

## 2022-10-17 VITALS — SYSTOLIC BLOOD PRESSURE: 128 MMHG | BODY MASS INDEX: 40.5 KG/M2 | DIASTOLIC BLOOD PRESSURE: 72 MMHG | WEIGHT: 225 LBS

## 2022-10-17 DIAGNOSIS — K59.00 CONSTIPATION, UNSPECIFIED CONSTIPATION TYPE: ICD-10-CM

## 2022-10-17 DIAGNOSIS — O47.9 IRREGULAR UTERINE CONTRACTIONS: ICD-10-CM

## 2022-10-17 DIAGNOSIS — Z34.03 ENCOUNTER FOR SUPERVISION OF NORMAL FIRST PREGNANCY IN THIRD TRIMESTER: Primary | ICD-10-CM

## 2022-10-17 DIAGNOSIS — K21.9 GASTROESOPHAGEAL REFLUX DISEASE WITHOUT ESOPHAGITIS: ICD-10-CM

## 2022-10-17 LAB — B-HEM STREP SPEC QL CULT: NEGATIVE

## 2022-10-17 PROCEDURE — 99214 OFFICE O/P EST MOD 30 MIN: CPT | Performed by: OBSTETRICS & GYNECOLOGY

## 2022-10-17 NOTE — PROGRESS NOTES
Chief Complaint  Routine Prenatal Visit (Patient complains of pressure, cramping and back pain. )    History of Present Illness:  Lorena is a  currently at 38w2d who presents today with complaints of increasing pressure as well as cramping.  Patient also with low back pain.  Patient has not been able to time any contractions.  She does report positive fetal movement.  She denies any leaking of fluid or bloody discharge.  Patient is taking her prenatal vitamins and iron supplements.  She has continued on her Pepcid.  Patient continues to have constipation as well.  Patient is concerned regarding the size of the fetus.  Her previous scan showed size greater than dates.  Patient also had an abnormal 1 hour GTT.    Exam:  Vitals:  See prenatal flowsheet as noted and reviewed  General: Alert, cooperative, and does not appear in any distress  Abdomen:   See prenatal flowsheet as noted and reviewed    Uterus gravid, non-tender; no palpable masses    No guarding or rebound tenderness  Pelvic:  See prenatal flowsheet as noted and reviewed  Ext:  See prenatal flowsheet as noted and reviewed    Moves extremities well, no cyanosis and no redness  Urine:  See prenatal flowsheet as noted and reviewed    Data Review:  The following data was reviewed by: Kirsten Dent MD on 10/17/2022:  Prenatal Labs:  Lab Results   Component Value Date    HGB 11.7 (L) 2022    RUBELLAABIGG 2.66 2022    HEPBSAG Negative 2022    ABO O 2022    RH Positive 2022    ABSCRN Negative 2022    [id number] Non Reactive 2022    HEPCVIRUSABY <0.1 2022       No visits with results within 1 Month(s) from this visit.   Latest known visit with results is:   Routine Prenatal on 2022   Component Date Value   • WBC 2022 10.22    • RBC 2022 4.09    • Hemoglobin 2022 11.7 (L)    • Hematocrit 2022 35.8    • MCV 2022 87.5    • MCH 2022 28.6    • MCHC 2022 32.7    • RDW  09/07/2022 13.4    • Platelets 09/07/2022 335      Imaging:  US Ob Follow Up Transabdominal Approach  Overall growth 62.6 percentile.  ASHLEY 18.3.  Vertex.  Anterior placenta.    Active fetus     Medical Records:  None    Assessment and Plan:  Problem List Items Addressed This Visit        Gastrointestinal Abdominal     Gastroesophageal reflux disease (Chronic)  Patient is to continue her Pepcid as previously given.   Other Visit Diagnoses     Encounter for supervision of normal first pregnancy in third trimester    -  Primary  Topics discussed:     GERD management  iron supplementation  kick counts and fetal movement  labor signs and symptoms  PIH precautions  Scan next visit for growth    Relevant Orders    US Ob Follow Up Transabdominal Approach    Constipation, unspecified constipation type      Patient is to continue her current medications as previously given.  Patient is to continue Colace and MiraLAX.    Irregular uterine contractions      Labor precautions and instructions have been given.  GBS is still pending.        Follow Up/Instructions:  Follow up as scheduled.  Patient was given instructions and counseling regarding her condition or for health maintenance advice. Please see specific information pulled into the AVS if appropriate.     Note: Speech recognition transcription software may have been used to dictate portions of this document.  An attempt at proofreading has been made though minor errors in transcription may still be present.    This note was electronically signed.  Kirsten Dent M.D.

## 2022-10-23 ENCOUNTER — HOSPITAL ENCOUNTER (OUTPATIENT)
Facility: HOSPITAL | Age: 32
Discharge: HOME | End: 2022-10-23
Attending: MIDWIFE | Admitting: MIDWIFE

## 2022-10-23 ENCOUNTER — HOSPITAL ENCOUNTER (INPATIENT)
Facility: HOSPITAL | Age: 32
LOS: 3 days | Discharge: HOME | End: 2022-10-26
Attending: MIDWIFE | Admitting: MIDWIFE

## 2022-10-23 ENCOUNTER — ANESTHESIA (OUTPATIENT)
Dept: PERIOP | Facility: HOSPITAL | Age: 32
End: 2022-10-23

## 2022-10-23 ENCOUNTER — ANESTHESIA EVENT (OUTPATIENT)
Dept: PERIOP | Facility: HOSPITAL | Age: 32
End: 2022-10-23

## 2022-10-23 VITALS — RESPIRATION RATE: 18 BRPM | TEMPERATURE: 98.1 F | BODY MASS INDEX: 41.88 KG/M2 | WEIGHT: 227.6 LBS | HEIGHT: 62 IN

## 2022-10-23 DIAGNOSIS — Z98.891 S/P CESAREAN SECTION: ICD-10-CM

## 2022-10-23 PROBLEM — Z34.90 PREGNANCY: Status: ACTIVE | Noted: 2022-10-23

## 2022-10-23 LAB
ABO GROUP BLD: NORMAL
ABO GROUP BLD: NORMAL
BASOPHILS # BLD AUTO: 0.04 10*3/MM3 (ref 0–0.2)
BASOPHILS NFR BLD AUTO: 0.2 % (ref 0–1.5)
BILIRUB BLD-MCNC: NEGATIVE MG/DL
BLD GP AB SCN SERPL QL: NEGATIVE
CLARITY, POC: CLEAR
COLOR UR: YELLOW
DEPRECATED RDW RBC AUTO: 46.1 FL (ref 37–54)
EOSINOPHIL # BLD AUTO: 0.01 10*3/MM3 (ref 0–0.4)
EOSINOPHIL NFR BLD AUTO: 0.1 % (ref 0.3–6.2)
ERYTHROCYTE [DISTWIDTH] IN BLOOD BY AUTOMATED COUNT: 15.1 % (ref 12.3–15.4)
GLUCOSE UR STRIP-MCNC: NEGATIVE MG/DL
HCT VFR BLD AUTO: 38 % (ref 34–46.6)
HGB BLD-MCNC: 12.3 G/DL (ref 12–15.9)
IMM GRANULOCYTES # BLD AUTO: 0.1 10*3/MM3 (ref 0–0.05)
IMM GRANULOCYTES NFR BLD AUTO: 0.6 % (ref 0–0.5)
KETONES UR QL: NEGATIVE
LEUKOCYTE EST, POC: NEGATIVE
LYMPHOCYTES # BLD AUTO: 0.78 10*3/MM3 (ref 0.7–3.1)
LYMPHOCYTES NFR BLD AUTO: 4.8 % (ref 19.6–45.3)
MCH RBC QN AUTO: 27.6 PG (ref 26.6–33)
MCHC RBC AUTO-ENTMCNC: 32.4 G/DL (ref 31.5–35.7)
MCV RBC AUTO: 85.4 FL (ref 79–97)
MONOCYTES # BLD AUTO: 0.41 10*3/MM3 (ref 0.1–0.9)
MONOCYTES NFR BLD AUTO: 2.5 % (ref 5–12)
NEUTROPHILS NFR BLD AUTO: 14.88 10*3/MM3 (ref 1.7–7)
NEUTROPHILS NFR BLD AUTO: 91.8 % (ref 42.7–76)
NITRITE UR-MCNC: NEGATIVE MG/ML
NRBC BLD AUTO-RTO: 0 /100 WBC (ref 0–0.2)
PH UR: 7 [PH] (ref 5–8)
PLATELET # BLD AUTO: 219 10*3/MM3 (ref 140–450)
PMV BLD AUTO: 10.4 FL (ref 6–12)
PROT UR STRIP-MCNC: NEGATIVE MG/DL
RBC # BLD AUTO: 4.45 10*6/MM3 (ref 3.77–5.28)
RBC # UR STRIP: NEGATIVE /UL
RBC MORPH BLD: NORMAL
RH BLD: POSITIVE
RH BLD: POSITIVE
SARS-COV-2 RNA PNL SPEC NAA+PROBE: NOT DETECTED
SMALL PLATELETS BLD QL SMEAR: ADEQUATE
SP GR UR: 1.01 (ref 1–1.03)
T&S EXPIRATION DATE: NORMAL
UROBILINOGEN UR QL: NORMAL
WBC MORPH BLD: NORMAL
WBC NRBC COR # BLD: 16.22 10*3/MM3 (ref 3.4–10.8)

## 2022-10-23 PROCEDURE — 87635 SARS-COV-2 COVID-19 AMP PRB: CPT | Performed by: MIDWIFE

## 2022-10-23 PROCEDURE — G0463 HOSPITAL OUTPT CLINIC VISIT: HCPCS

## 2022-10-23 PROCEDURE — 86900 BLOOD TYPING SEROLOGIC ABO: CPT | Performed by: MIDWIFE

## 2022-10-23 PROCEDURE — 59025 FETAL NON-STRESS TEST: CPT | Performed by: MIDWIFE

## 2022-10-23 PROCEDURE — 59025 FETAL NON-STRESS TEST: CPT

## 2022-10-23 PROCEDURE — 85025 COMPLETE CBC W/AUTO DIFF WBC: CPT | Performed by: MIDWIFE

## 2022-10-23 PROCEDURE — 86900 BLOOD TYPING SEROLOGIC ABO: CPT

## 2022-10-23 PROCEDURE — 86901 BLOOD TYPING SEROLOGIC RH(D): CPT

## 2022-10-23 PROCEDURE — 81002 URINALYSIS NONAUTO W/O SCOPE: CPT | Performed by: MIDWIFE

## 2022-10-23 PROCEDURE — 86850 RBC ANTIBODY SCREEN: CPT | Performed by: MIDWIFE

## 2022-10-23 PROCEDURE — 85007 BL SMEAR W/DIFF WBC COUNT: CPT | Performed by: MIDWIFE

## 2022-10-23 PROCEDURE — 51703 INSERT BLADDER CATH COMPLEX: CPT

## 2022-10-23 PROCEDURE — 86901 BLOOD TYPING SEROLOGIC RH(D): CPT | Performed by: MIDWIFE

## 2022-10-23 PROCEDURE — C1755 CATHETER, INTRASPINAL: HCPCS | Performed by: NURSE ANESTHETIST, CERTIFIED REGISTERED

## 2022-10-23 RX ORDER — SODIUM CHLORIDE, SODIUM LACTATE, POTASSIUM CHLORIDE, CALCIUM CHLORIDE 600; 310; 30; 20 MG/100ML; MG/100ML; MG/100ML; MG/100ML
125 INJECTION, SOLUTION INTRAVENOUS CONTINUOUS
Status: DISCONTINUED | OUTPATIENT
Start: 2022-10-23 | End: 2022-10-24

## 2022-10-23 RX ORDER — SODIUM CHLORIDE 0.9 % (FLUSH) 0.9 %
10 SYRINGE (ML) INJECTION EVERY 12 HOURS SCHEDULED
Status: DISCONTINUED | OUTPATIENT
Start: 2022-10-23 | End: 2022-10-24

## 2022-10-23 RX ORDER — SODIUM CHLORIDE 0.9 % (FLUSH) 0.9 %
10 SYRINGE (ML) INJECTION AS NEEDED
Status: DISCONTINUED | OUTPATIENT
Start: 2022-10-23 | End: 2022-10-24

## 2022-10-23 RX ORDER — EPHEDRINE SULFATE 5 MG/ML
5 INJECTION INTRAVENOUS
Status: DISCONTINUED | OUTPATIENT
Start: 2022-10-23 | End: 2022-10-24

## 2022-10-23 RX ORDER — ONDANSETRON 2 MG/ML
4 INJECTION INTRAMUSCULAR; INTRAVENOUS ONCE AS NEEDED
Status: DISCONTINUED | OUTPATIENT
Start: 2022-10-23 | End: 2022-10-24

## 2022-10-23 RX ORDER — TRISODIUM CITRATE DIHYDRATE AND CITRIC ACID MONOHYDRATE 500; 334 MG/5ML; MG/5ML
30 SOLUTION ORAL ONCE
Status: DISCONTINUED | OUTPATIENT
Start: 2022-10-23 | End: 2022-10-24

## 2022-10-23 RX ORDER — LIDOCAINE HYDROCHLORIDE 10 MG/ML
5 INJECTION, SOLUTION EPIDURAL; INFILTRATION; INTRACAUDAL; PERINEURAL AS NEEDED
Status: DISCONTINUED | OUTPATIENT
Start: 2022-10-23 | End: 2022-10-24

## 2022-10-23 RX ORDER — MAGNESIUM CARB/ALUMINUM HYDROX 105-160MG
30 TABLET,CHEWABLE ORAL ONCE
Status: DISCONTINUED | OUTPATIENT
Start: 2022-10-23 | End: 2022-10-24

## 2022-10-23 RX ADMIN — Medication 12 ML/HR: at 22:55

## 2022-10-23 RX ADMIN — SODIUM CHLORIDE, POTASSIUM CHLORIDE, SODIUM LACTATE AND CALCIUM CHLORIDE 125 ML/HR: 600; 310; 30; 20 INJECTION, SOLUTION INTRAVENOUS at 21:55

## 2022-10-23 NOTE — NON STRESS TEST
"Triage Note - Nursing Documentation  Labor and Delivery Admission Log    Lorena Keen  : 1990  MRN: [id number]  CSN: [id number]    Date in / Time in:  10/23/2022  Time in: 903    Date out / Time out:    Time out: 1030    Nurse: Nubia Macedo, RN    Patient Info: She is a 32 y.o. year old  at 39w1d with an SALEEM of 10/29/2022, by Last Menstrual Period who was seen on the [facility].    Chief Complaint:   Chief Complaint   Patient presents with   • Abdominal Cramping       Provider Instructions / Disposition: pt states has lost mucous plug and having irregular contractions explaining them as \"cramping\"  Po hydrate. Office Tuesday    Patient Active Problem List   Diagnosis   • Carpal tunnel syndrome   • Depression   • Generalized anxiety disorder   • Gastroesophageal reflux disease   • Irritable bowel syndrome   • Migraine   • OCD (obsessive compulsive disorder)   • Thoracic myofascial strain   • Tobacco abuse       NST Documentation (Only applicable > 32 weeks): Interpretation A  Nonstress Test Interpretation A: Reactive (10/23/22 1029 : Nubia Macedo, RN)    "

## 2022-10-24 LAB — GLUCOSE BLDC GLUCOMTR-MCNC: 105 MG/DL (ref 70–130)

## 2022-10-24 PROCEDURE — 25010000002 OXYTOCIN PER 10 UNITS: Performed by: NURSE ANESTHETIST, CERTIFIED REGISTERED

## 2022-10-24 PROCEDURE — 25010000002 ONDANSETRON PER 1 MG: Performed by: NURSE ANESTHETIST, CERTIFIED REGISTERED

## 2022-10-24 PROCEDURE — 0 MEPERIDINE PER 100 MG: Performed by: NURSE ANESTHETIST, CERTIFIED REGISTERED

## 2022-10-24 PROCEDURE — 25010000002 PHENYLEPHRINE 10 MG/ML SOLUTION: Performed by: NURSE ANESTHETIST, CERTIFIED REGISTERED

## 2022-10-24 PROCEDURE — 82962 GLUCOSE BLOOD TEST: CPT

## 2022-10-24 PROCEDURE — 59515 CESAREAN DELIVERY: CPT | Performed by: OBSTETRICS & GYNECOLOGY

## 2022-10-24 PROCEDURE — 25010000002 AZITHROMYCIN PER 500 MG: Performed by: MIDWIFE

## 2022-10-24 PROCEDURE — 0 CEFAZOLIN SODIUM-DEXTROSE 2-3 GM-%(50ML) RECONSTITUTED SOLUTION: Performed by: MIDWIFE

## 2022-10-24 PROCEDURE — 25010000002 MORPHINE PER 10 MG: Performed by: NURSE ANESTHETIST, CERTIFIED REGISTERED

## 2022-10-24 PROCEDURE — 88307 TISSUE EXAM BY PATHOLOGIST: CPT

## 2022-10-24 RX ORDER — TRISODIUM CITRATE DIHYDRATE AND CITRIC ACID MONOHYDRATE 500; 334 MG/5ML; MG/5ML
30 SOLUTION ORAL ONCE
Status: COMPLETED | OUTPATIENT
Start: 2022-10-24 | End: 2022-10-24

## 2022-10-24 RX ORDER — OXYCODONE HYDROCHLORIDE 5 MG/1
10 TABLET ORAL EVERY 4 HOURS PRN
Status: DISCONTINUED | OUTPATIENT
Start: 2022-10-24 | End: 2022-10-26 | Stop reason: HOSPADM

## 2022-10-24 RX ORDER — ALUMINA, MAGNESIA, AND SIMETHICONE 2400; 2400; 240 MG/30ML; MG/30ML; MG/30ML
15 SUSPENSION ORAL EVERY 4 HOURS PRN
Status: DISCONTINUED | OUTPATIENT
Start: 2022-10-24 | End: 2022-10-26 | Stop reason: HOSPADM

## 2022-10-24 RX ORDER — OXYTOCIN 10 [USP'U]/ML
10 INJECTION, SOLUTION INTRAMUSCULAR; INTRAVENOUS ONCE
Status: DISCONTINUED | OUTPATIENT
Start: 2022-10-24 | End: 2022-10-26 | Stop reason: HOSPADM

## 2022-10-24 RX ORDER — ONDANSETRON 2 MG/ML
INJECTION INTRAMUSCULAR; INTRAVENOUS AS NEEDED
Status: DISCONTINUED | OUTPATIENT
Start: 2022-10-24 | End: 2022-10-24 | Stop reason: SURG

## 2022-10-24 RX ORDER — ONDANSETRON 2 MG/ML
4 INJECTION INTRAMUSCULAR; INTRAVENOUS EVERY 6 HOURS PRN
Status: DISCONTINUED | OUTPATIENT
Start: 2022-10-24 | End: 2022-10-26 | Stop reason: HOSPADM

## 2022-10-24 RX ORDER — FAMOTIDINE 10 MG/ML
20 INJECTION, SOLUTION INTRAVENOUS ONCE
Status: DISCONTINUED | OUTPATIENT
Start: 2022-10-24 | End: 2022-10-24 | Stop reason: HOSPADM

## 2022-10-24 RX ORDER — ACETAMINOPHEN 500 MG
1000 TABLET ORAL EVERY 6 HOURS
Status: COMPLETED | OUTPATIENT
Start: 2022-10-24 | End: 2022-10-25

## 2022-10-24 RX ORDER — MORPHINE SULFATE 2 MG/ML
2 INJECTION, SOLUTION INTRAMUSCULAR; INTRAVENOUS
Status: ACTIVE | OUTPATIENT
Start: 2022-10-24 | End: 2022-10-25

## 2022-10-24 RX ORDER — ACETAMINOPHEN 500 MG
1000 TABLET ORAL ONCE
Status: COMPLETED | OUTPATIENT
Start: 2022-10-24 | End: 2022-10-24

## 2022-10-24 RX ORDER — CARBOPROST TROMETHAMINE 250 UG/ML
250 INJECTION, SOLUTION INTRAMUSCULAR AS NEEDED
Status: DISCONTINUED | OUTPATIENT
Start: 2022-10-24 | End: 2022-10-24 | Stop reason: HOSPADM

## 2022-10-24 RX ORDER — IBUPROFEN 600 MG/1
600 TABLET ORAL EVERY 6 HOURS PRN
Status: DISCONTINUED | OUTPATIENT
Start: 2022-10-24 | End: 2022-10-25

## 2022-10-24 RX ORDER — DIPHENHYDRAMINE HCL 25 MG
25 CAPSULE ORAL EVERY 4 HOURS PRN
Status: DISCONTINUED | OUTPATIENT
Start: 2022-10-24 | End: 2022-10-26 | Stop reason: HOSPADM

## 2022-10-24 RX ORDER — SIMETHICONE 80 MG
80 TABLET,CHEWABLE ORAL 4 TIMES DAILY PRN
Status: DISCONTINUED | OUTPATIENT
Start: 2022-10-24 | End: 2022-10-26 | Stop reason: HOSPADM

## 2022-10-24 RX ORDER — MORPHINE SULFATE 2 MG/ML
2 INJECTION, SOLUTION INTRAMUSCULAR; INTRAVENOUS EVERY 4 HOURS PRN
Status: DISCONTINUED | OUTPATIENT
Start: 2022-10-24 | End: 2022-10-26 | Stop reason: HOSPADM

## 2022-10-24 RX ORDER — MEPERIDINE HYDROCHLORIDE 25 MG/ML
12.5 INJECTION INTRAMUSCULAR; INTRAVENOUS; SUBCUTANEOUS ONCE
Status: COMPLETED | OUTPATIENT
Start: 2022-10-24 | End: 2022-10-24

## 2022-10-24 RX ORDER — OXYCODONE HYDROCHLORIDE 5 MG/1
5 TABLET ORAL EVERY 4 HOURS PRN
Status: DISCONTINUED | OUTPATIENT
Start: 2022-10-24 | End: 2022-10-26 | Stop reason: HOSPADM

## 2022-10-24 RX ORDER — METHYLERGONOVINE MALEATE 0.2 MG/ML
200 INJECTION INTRAVENOUS ONCE AS NEEDED
Status: DISCONTINUED | OUTPATIENT
Start: 2022-10-24 | End: 2022-10-24 | Stop reason: HOSPADM

## 2022-10-24 RX ORDER — KETOROLAC TROMETHAMINE 30 MG/ML
30 INJECTION, SOLUTION INTRAMUSCULAR; INTRAVENOUS ONCE
Status: DISCONTINUED | OUTPATIENT
Start: 2022-10-24 | End: 2022-10-24 | Stop reason: HOSPADM

## 2022-10-24 RX ORDER — METHYLERGONOVINE MALEATE 0.2 MG/ML
200 INJECTION INTRAVENOUS ONCE AS NEEDED
Status: DISCONTINUED | OUTPATIENT
Start: 2022-10-24 | End: 2022-10-26 | Stop reason: HOSPADM

## 2022-10-24 RX ORDER — CEFAZOLIN SODIUM 2 G/50ML
2 SOLUTION INTRAVENOUS ONCE
Status: COMPLETED | OUTPATIENT
Start: 2022-10-24 | End: 2022-10-24

## 2022-10-24 RX ORDER — OXYTOCIN/0.9 % SODIUM CHLORIDE 30/500 ML
2 PLASTIC BAG, INJECTION (ML) INTRAVENOUS
Status: DISCONTINUED | OUTPATIENT
Start: 2022-10-24 | End: 2022-10-24

## 2022-10-24 RX ORDER — SODIUM CHLORIDE, SODIUM LACTATE, POTASSIUM CHLORIDE, CALCIUM CHLORIDE 600; 310; 30; 20 MG/100ML; MG/100ML; MG/100ML; MG/100ML
INJECTION, SOLUTION INTRAVENOUS CONTINUOUS PRN
Status: DISCONTINUED | OUTPATIENT
Start: 2022-10-24 | End: 2022-10-24 | Stop reason: SURG

## 2022-10-24 RX ORDER — CALCIUM CARBONATE 200(500)MG
1 TABLET,CHEWABLE ORAL EVERY 4 HOURS PRN
Status: DISCONTINUED | OUTPATIENT
Start: 2022-10-24 | End: 2022-10-26 | Stop reason: HOSPADM

## 2022-10-24 RX ORDER — CARBOPROST TROMETHAMINE 250 UG/ML
250 INJECTION, SOLUTION INTRAMUSCULAR
Status: DISCONTINUED | OUTPATIENT
Start: 2022-10-24 | End: 2022-10-26 | Stop reason: HOSPADM

## 2022-10-24 RX ORDER — ACETAMINOPHEN 325 MG/1
650 TABLET ORAL EVERY 6 HOURS
Status: DISCONTINUED | OUTPATIENT
Start: 2022-10-25 | End: 2022-10-25

## 2022-10-24 RX ORDER — MORPHINE SULFATE 1 MG/ML
INJECTION, SOLUTION EPIDURAL; INTRATHECAL; INTRAVENOUS AS NEEDED
Status: DISCONTINUED | OUTPATIENT
Start: 2022-10-24 | End: 2022-10-24 | Stop reason: SURG

## 2022-10-24 RX ORDER — OXYTOCIN 10 [USP'U]/ML
INJECTION, SOLUTION INTRAMUSCULAR; INTRAVENOUS AS NEEDED
Status: DISCONTINUED | OUTPATIENT
Start: 2022-10-24 | End: 2022-10-24 | Stop reason: SURG

## 2022-10-24 RX ORDER — PRENATAL VIT/IRON FUM/FOLIC AC 27MG-0.8MG
1 TABLET ORAL DAILY
Status: DISCONTINUED | OUTPATIENT
Start: 2022-10-24 | End: 2022-10-26 | Stop reason: HOSPADM

## 2022-10-24 RX ORDER — MISOPROSTOL 200 UG/1
800 TABLET ORAL AS NEEDED
Status: DISCONTINUED | OUTPATIENT
Start: 2022-10-24 | End: 2022-10-24 | Stop reason: HOSPADM

## 2022-10-24 RX ORDER — DOCUSATE SODIUM 100 MG/1
100 CAPSULE, LIQUID FILLED ORAL 2 TIMES DAILY PRN
Status: DISCONTINUED | OUTPATIENT
Start: 2022-10-24 | End: 2022-10-26 | Stop reason: HOSPADM

## 2022-10-24 RX ORDER — MISOPROSTOL 200 UG/1
800 TABLET ORAL ONCE AS NEEDED
Status: DISCONTINUED | OUTPATIENT
Start: 2022-10-24 | End: 2022-10-26 | Stop reason: HOSPADM

## 2022-10-24 RX ORDER — NALOXONE HCL 0.4 MG/ML
0.4 VIAL (ML) INJECTION
Status: DISCONTINUED | OUTPATIENT
Start: 2022-10-24 | End: 2022-10-26 | Stop reason: HOSPADM

## 2022-10-24 RX ORDER — SODIUM CHLORIDE 9 MG/ML
125 INJECTION, SOLUTION INTRAVENOUS CONTINUOUS
Status: DISCONTINUED | OUTPATIENT
Start: 2022-10-24 | End: 2022-10-26 | Stop reason: HOSPADM

## 2022-10-24 RX ORDER — LIDOCAINE HYDROCHLORIDE AND EPINEPHRINE 20; 5 MG/ML; UG/ML
INJECTION, SOLUTION EPIDURAL; INFILTRATION; INTRACAUDAL; PERINEURAL AS NEEDED
Status: DISCONTINUED | OUTPATIENT
Start: 2022-10-24 | End: 2022-10-24 | Stop reason: SURG

## 2022-10-24 RX ORDER — PHENYLEPHRINE HYDROCHLORIDE 10 MG/ML
INJECTION INTRAVENOUS AS NEEDED
Status: DISCONTINUED | OUTPATIENT
Start: 2022-10-24 | End: 2022-10-24 | Stop reason: SURG

## 2022-10-24 RX ORDER — FAMOTIDINE 10 MG/ML
20 INJECTION, SOLUTION INTRAVENOUS 2 TIMES DAILY
Status: DISCONTINUED | OUTPATIENT
Start: 2022-10-24 | End: 2022-10-24

## 2022-10-24 RX ADMIN — ACETAMINOPHEN 1000 MG: 500 TABLET, FILM COATED ORAL at 20:35

## 2022-10-24 RX ADMIN — FAMOTIDINE 20 MG: 10 INJECTION INTRAVENOUS at 02:01

## 2022-10-24 RX ADMIN — SODIUM CITRATE AND CITRIC ACID MONOHYDRATE 30 ML: 500; 334 SOLUTION ORAL at 04:58

## 2022-10-24 RX ADMIN — CEFAZOLIN SODIUM 2 G: 2 SOLUTION INTRAVENOUS at 05:27

## 2022-10-24 RX ADMIN — SODIUM CHLORIDE, POTASSIUM CHLORIDE, SODIUM LACTATE AND CALCIUM CHLORIDE: 600; 310; 30; 20 INJECTION, SOLUTION INTRAVENOUS at 05:54

## 2022-10-24 RX ADMIN — SODIUM CHLORIDE, POTASSIUM CHLORIDE, SODIUM LACTATE AND CALCIUM CHLORIDE: 600; 310; 30; 20 INJECTION, SOLUTION INTRAVENOUS at 06:25

## 2022-10-24 RX ADMIN — SODIUM CHLORIDE, POTASSIUM CHLORIDE, SODIUM LACTATE AND CALCIUM CHLORIDE: 600; 310; 30; 20 INJECTION, SOLUTION INTRAVENOUS at 05:27

## 2022-10-24 RX ADMIN — Medication 2 MILLI-UNITS/MIN: at 01:07

## 2022-10-24 RX ADMIN — ACETAMINOPHEN 1000 MG: 500 TABLET, FILM COATED ORAL at 11:38

## 2022-10-24 RX ADMIN — ONDANSETRON 4 MG: 2 INJECTION INTRAMUSCULAR; INTRAVENOUS at 05:27

## 2022-10-24 RX ADMIN — LIDOCAINE HYDROCHLORIDE,EPINEPHRINE BITARTRATE 2 ML: 20; .005 INJECTION, SOLUTION EPIDURAL; INFILTRATION; INTRACAUDAL; PERINEURAL at 05:55

## 2022-10-24 RX ADMIN — PHENYLEPHRINE HYDROCHLORIDE 100 MCG: 10 INJECTION INTRAVENOUS at 05:53

## 2022-10-24 RX ADMIN — OXYTOCIN 20 UNITS: 10 INJECTION INTRAVENOUS at 05:54

## 2022-10-24 RX ADMIN — ACETAMINOPHEN 1000 MG: 500 TABLET, FILM COATED ORAL at 05:02

## 2022-10-24 RX ADMIN — MEPERIDINE HYDROCHLORIDE 12.5 MG: 25 INJECTION, SOLUTION INTRAMUSCULAR; INTRAVENOUS; SUBCUTANEOUS at 06:50

## 2022-10-24 RX ADMIN — MORPHINE SULFATE 2.5 MG: 1 INJECTION EPIDURAL; INTRATHECAL; INTRAVENOUS at 05:55

## 2022-10-24 RX ADMIN — LIDOCAINE HYDROCHLORIDE,EPINEPHRINE BITARTRATE 16 ML: 20; .005 INJECTION, SOLUTION EPIDURAL; INFILTRATION; INTRACAUDAL; PERINEURAL at 05:27

## 2022-10-24 NOTE — ANESTHESIA POSTPROCEDURE EVALUATION
Patient: Lorena Keen    Procedure Summary     Date: 10/23/22 Room / Location: [facility] OR 2 /  [facility]    Anesthesia Start:  Anesthesia Stop: 10/24/22 0636    Procedure:  SECTION PRIMARY (Abdomen) Diagnosis:       Fetal intolerance to labor, delivered, current hospitalization      (Fetal intolerance to labor, delivered, current hospitalization [O77.9])    Surgeons: Brenden Swan MD Provider: Tam Davis CRNA    Anesthesia Type: epidural ASA Status: 2          Anesthesia Type: epidural    Vitals  Vitals Value Taken Time   /92 10/24/22 0500   Temp 99.4 °F (37.4 °C) 10/24/22 0500   Pulse 133 10/24/22 0500   Resp     SpO2 100 % 10/24/22 050   Vitals shown include unvalidated device data.        Post Anesthesia Care and Evaluation    Patient location during evaluation: PACU  Patient participation: complete - patient participated  Level of consciousness: awake and alert  Pain score: 0  Pain management: satisfactory to patient    Airway patency: patent  Anesthetic complications: No anesthetic complications  PONV Status: none  Cardiovascular status: acceptable and stable  Respiratory status: acceptable, nonlabored ventilation, spontaneous ventilation and unassisted  Hydration status: acceptable  Post Neuraxial Block status: No signs or symptoms of PDPH  Comments: Vitals signs as noted in nursing documentation as per protocol.

## 2022-10-24 NOTE — ANESTHESIA PREPROCEDURE EVALUATION
Anesthesia Evaluation     Patient summary reviewed and Nursing notes reviewed   NPO Solid Status: > 4 hours  NPO Liquid Status: > 4 hours           Airway   Mallampati: II  TM distance: >3 FB  Neck ROM: full  Possible difficult intubation  Dental - normal exam     Pulmonary - normal exam   (+) a smoker Current,   Cardiovascular - normal exam    ECG reviewed        Neuro/Psych  (+) headaches (migraines), numbness, psychiatric history Anxiety and Depression,    GI/Hepatic/Renal/Endo    (+)  GERD,      Musculoskeletal     Abdominal    Substance History - negative use     OB/GYN    (+) Pregnant,         Other        ROS/Med Hx Other: plt 219                Anesthesia Plan    ASA 2     epidural     (Risk and benefits discussed, discussed risk of nausea, vomiting, itching, low blood pressure, post-procedural back pain, PDPH, and infection. Patient reports understanding. Continue with POC)    Anesthetic plan, risks, benefits, and alternatives have been provided, discussed and informed consent has been obtained with: patient.  Pre-procedure education provided  Plan discussed with CRNA.        CODE STATUS:

## 2022-10-24 NOTE — ANESTHESIA PROCEDURE NOTES
Labor Epidural    Pre-sedation assessment completed: 10/23/2022 10:45 PM    Patient reassessed immediately prior to procedure    Patient location during procedure: OB  Start Time: 10/23/2022 10:46 PM  Stop Time: 10/23/2022 11:03 PM  Performed By  CRNA/CAA: Tam Davis CRNA  Preanesthetic Checklist  Completed: patient identified, IV checked, site marked, risks and benefits discussed, surgical consent, monitors and equipment checked, pre-op evaluation and timeout performed  Additional Notes  Risks discussed , including but not limited to:  Headache, itching, n&v, infection, failure, decreased blood pressure, permanent chronic/back pain, nerve damage, paralysis, etc. All questions answered and informed consent obtained. Skin localized with lidocaine skin wheel. Test dose 3 ml of 1.5% lidocaine with 1:200,000 epi.  Prep:  Pt Position:sitting  Sterile Tech:cap, gloves, mask and sterile barrier  Prep:chlorhexidine gluconate and isopropyl alcohol  Monitoring:blood pressure monitoring and continuous pulse oximetry  Epidural Block Procedure:  Approach:midline  Guidance:landmark technique and palpation technique  Location:L4-L5  Needle Type:Tuohy  Needle Gauge:18 G  Loss of Resistance Medium: saline  Loss of Resistance: 7cm  Cath Depth at skin:11 cm  Paresthesia: none  Aspiration:negative  Test Dose:negative  Number of Attempts: 1  Post Assessment:  Dressing:occlusive dressing applied and secured with tape  Pt Tolerance:patient tolerated the procedure well with no apparent complications  Complications:no

## 2022-10-25 LAB
BASOPHILS # BLD AUTO: 0.03 10*3/MM3 (ref 0–0.2)
BASOPHILS NFR BLD AUTO: 0.2 % (ref 0–1.5)
DEPRECATED RDW RBC AUTO: 47.7 FL (ref 37–54)
EOSINOPHIL # BLD AUTO: 0.02 10*3/MM3 (ref 0–0.4)
EOSINOPHIL NFR BLD AUTO: 0.1 % (ref 0.3–6.2)
ERYTHROCYTE [DISTWIDTH] IN BLOOD BY AUTOMATED COUNT: 15.8 % (ref 12.3–15.4)
HCT VFR BLD AUTO: 29.7 % (ref 34–46.6)
HGB BLD-MCNC: 9.6 G/DL (ref 12–15.9)
IMM GRANULOCYTES # BLD AUTO: 0.1 10*3/MM3 (ref 0–0.05)
IMM GRANULOCYTES NFR BLD AUTO: 0.7 % (ref 0–0.5)
LYMPHOCYTES # BLD AUTO: 1.23 10*3/MM3 (ref 0.7–3.1)
LYMPHOCYTES NFR BLD AUTO: 8.1 % (ref 19.6–45.3)
MCH RBC QN AUTO: 27.4 PG (ref 26.6–33)
MCHC RBC AUTO-ENTMCNC: 32.3 G/DL (ref 31.5–35.7)
MCV RBC AUTO: 84.9 FL (ref 79–97)
MONOCYTES # BLD AUTO: 0.85 10*3/MM3 (ref 0.1–0.9)
MONOCYTES NFR BLD AUTO: 5.6 % (ref 5–12)
NEUTROPHILS NFR BLD AUTO: 12.88 10*3/MM3 (ref 1.7–7)
NEUTROPHILS NFR BLD AUTO: 85.3 % (ref 42.7–76)
NRBC BLD AUTO-RTO: 0 /100 WBC (ref 0–0.2)
PLATELET # BLD AUTO: 240 10*3/MM3 (ref 140–450)
PMV BLD AUTO: 9 FL (ref 6–12)
RBC # BLD AUTO: 3.5 10*6/MM3 (ref 3.77–5.28)
WBC NRBC COR # BLD: 15.11 10*3/MM3 (ref 3.4–10.8)

## 2022-10-25 PROCEDURE — 25010000002 TETANUS-DIPHTH-ACELL PERTUSSIS 5-2.5-18.5 LF-MCG/0.5 SUSPENSION PREFILLED SYRINGE: Performed by: OBSTETRICS & GYNECOLOGY

## 2022-10-25 PROCEDURE — 85025 COMPLETE CBC W/AUTO DIFF WBC: CPT | Performed by: OBSTETRICS & GYNECOLOGY

## 2022-10-25 PROCEDURE — G0008 ADMIN INFLUENZA VIRUS VAC: HCPCS | Performed by: OBSTETRICS & GYNECOLOGY

## 2022-10-25 PROCEDURE — 90471 IMMUNIZATION ADMIN: CPT | Performed by: OBSTETRICS & GYNECOLOGY

## 2022-10-25 PROCEDURE — 90715 TDAP VACCINE 7 YRS/> IM: CPT | Performed by: OBSTETRICS & GYNECOLOGY

## 2022-10-25 PROCEDURE — 90686 IIV4 VACC NO PRSV 0.5 ML IM: CPT | Performed by: OBSTETRICS & GYNECOLOGY

## 2022-10-25 PROCEDURE — 25010000002 INFLUENZA VAC SPLIT QUAD 0.5 ML SUSPENSION PREFILLED SYRINGE: Performed by: OBSTETRICS & GYNECOLOGY

## 2022-10-25 PROCEDURE — 0503F POSTPARTUM CARE VISIT: CPT | Performed by: MIDWIFE

## 2022-10-25 RX ORDER — IBUPROFEN 600 MG/1
600 TABLET ORAL EVERY 6 HOURS SCHEDULED
Status: DISCONTINUED | OUTPATIENT
Start: 2022-10-25 | End: 2022-10-25

## 2022-10-25 RX ORDER — FERROUS SULFATE TAB EC 324 MG (65 MG FE EQUIVALENT) 324 (65 FE) MG
324 TABLET DELAYED RESPONSE ORAL 2 TIMES DAILY WITH MEALS
Status: DISCONTINUED | OUTPATIENT
Start: 2022-10-25 | End: 2022-10-26 | Stop reason: HOSPADM

## 2022-10-25 RX ORDER — ACETAMINOPHEN 500 MG
1000 TABLET ORAL EVERY 8 HOURS
Status: DISCONTINUED | OUTPATIENT
Start: 2022-10-25 | End: 2022-10-26 | Stop reason: HOSPADM

## 2022-10-25 RX ORDER — IBUPROFEN 800 MG/1
800 TABLET ORAL EVERY 8 HOURS SCHEDULED
Status: DISCONTINUED | OUTPATIENT
Start: 2022-10-25 | End: 2022-10-26 | Stop reason: HOSPADM

## 2022-10-25 RX ADMIN — CALCIUM CARBONATE (ANTACID) CHEW TAB 500 MG 1 TABLET: 500 CHEW TAB at 10:24

## 2022-10-25 RX ADMIN — PRENATAL VITAMINS-IRON FUMARATE 27 MG IRON-FOLIC ACID 0.8 MG TABLET 1 TABLET: at 10:27

## 2022-10-25 RX ADMIN — ACETAMINOPHEN 1000 MG: 500 TABLET, FILM COATED ORAL at 15:25

## 2022-10-25 RX ADMIN — ACETAMINOPHEN 1000 MG: 500 TABLET, FILM COATED ORAL at 23:46

## 2022-10-25 RX ADMIN — INFLUENZA VIRUS VACCINE 0.5 ML: 15; 15; 15; 15 SUSPENSION INTRAMUSCULAR at 21:15

## 2022-10-25 RX ADMIN — ACETAMINOPHEN 1000 MG: 500 TABLET, FILM COATED ORAL at 02:55

## 2022-10-25 RX ADMIN — SIMETHICONE 80 MG: 80 TABLET, CHEWABLE ORAL at 10:24

## 2022-10-25 RX ADMIN — OXYCODONE 5 MG: 5 TABLET ORAL at 05:19

## 2022-10-25 RX ADMIN — FERROUS SULFATE TAB EC 324 MG (65 MG FE EQUIVALENT) 324 MG: 324 (65 FE) TABLET DELAYED RESPONSE at 17:22

## 2022-10-25 RX ADMIN — TETANUS TOXOID, REDUCED DIPHTHERIA TOXOID AND ACELLULAR PERTUSSIS VACCINE, ADSORBED 0.5 ML: 5; 2.5; 8; 8; 2.5 SUSPENSION INTRAMUSCULAR at 21:17

## 2022-10-25 RX ADMIN — OXYCODONE 5 MG: 5 TABLET ORAL at 10:28

## 2022-10-25 RX ADMIN — DOCUSATE SODIUM 100 MG: 100 CAPSULE, LIQUID FILLED ORAL at 10:27

## 2022-10-25 RX ADMIN — IBUPROFEN 800 MG: 800 TABLET, FILM COATED ORAL at 18:49

## 2022-10-25 RX ADMIN — OXYCODONE 5 MG: 5 TABLET ORAL at 12:54

## 2022-10-25 RX ADMIN — OXYCODONE 10 MG: 5 TABLET ORAL at 17:20

## 2022-10-25 RX ADMIN — ACETAMINOPHEN 1000 MG: 500 TABLET, FILM COATED ORAL at 10:24

## 2022-10-26 VITALS
SYSTOLIC BLOOD PRESSURE: 107 MMHG | WEIGHT: 227 LBS | OXYGEN SATURATION: 96 % | BODY MASS INDEX: 41.77 KG/M2 | DIASTOLIC BLOOD PRESSURE: 73 MMHG | TEMPERATURE: 97.7 F | RESPIRATION RATE: 18 BRPM | HEART RATE: 77 BPM | HEIGHT: 62 IN

## 2022-10-26 LAB — REF LAB TEST METHOD: NORMAL

## 2022-10-26 PROCEDURE — 63710000001 ONDANSETRON PER 8 MG: Performed by: OBSTETRICS & GYNECOLOGY

## 2022-10-26 RX ORDER — IBUPROFEN 800 MG/1
800 TABLET ORAL EVERY 8 HOURS PRN
Qty: 30 TABLET | Refills: 0 | Status: SHIPPED | OUTPATIENT
Start: 2022-10-26 | End: 2022-12-05

## 2022-10-26 RX ORDER — OXYCODONE HYDROCHLORIDE 5 MG/1
5 TABLET ORAL EVERY 4 HOURS PRN
Qty: 10 TABLET | Refills: 0 | Status: SHIPPED | OUTPATIENT
Start: 2022-10-26 | End: 2022-12-05

## 2022-10-26 RX ORDER — ACETAMINOPHEN 500 MG
1000 TABLET ORAL EVERY 8 HOURS
Qty: 60 TABLET | Refills: 0 | Status: SHIPPED | OUTPATIENT
Start: 2022-10-26 | End: 2022-12-05

## 2022-10-26 RX ORDER — ONDANSETRON 4 MG/1
4 TABLET, FILM COATED ORAL EVERY 6 HOURS PRN
Status: DISCONTINUED | OUTPATIENT
Start: 2022-10-26 | End: 2022-10-26 | Stop reason: HOSPADM

## 2022-10-26 RX ORDER — FERROUS SULFATE TAB EC 324 MG (65 MG FE EQUIVALENT) 324 (65 FE) MG
324 TABLET DELAYED RESPONSE ORAL 2 TIMES DAILY WITH MEALS
Qty: 60 TABLET | Refills: 2 | Status: SHIPPED | OUTPATIENT
Start: 2022-10-26 | End: 2022-12-05

## 2022-10-26 RX ORDER — ONDANSETRON 4 MG/1
4 TABLET, FILM COATED ORAL EVERY 8 HOURS PRN
Qty: 20 TABLET | Refills: 0 | Status: SHIPPED | OUTPATIENT
Start: 2022-10-26 | End: 2022-12-05

## 2022-10-26 RX ADMIN — PRENATAL VITAMINS-IRON FUMARATE 27 MG IRON-FOLIC ACID 0.8 MG TABLET 1 TABLET: at 08:22

## 2022-10-26 RX ADMIN — IBUPROFEN 800 MG: 800 TABLET, FILM COATED ORAL at 03:07

## 2022-10-26 RX ADMIN — DOCUSATE SODIUM 100 MG: 100 CAPSULE, LIQUID FILLED ORAL at 08:22

## 2022-10-26 RX ADMIN — SIMETHICONE 80 MG: 80 TABLET, CHEWABLE ORAL at 08:22

## 2022-10-26 RX ADMIN — ONDANSETRON HYDROCHLORIDE 4 MG: 4 TABLET, FILM COATED ORAL at 04:11

## 2022-10-26 RX ADMIN — FERROUS SULFATE TAB EC 324 MG (65 MG FE EQUIVALENT) 324 MG: 324 (65 FE) TABLET DELAYED RESPONSE at 08:22

## 2022-10-26 RX ADMIN — OXYCODONE 5 MG: 5 TABLET ORAL at 08:28

## 2022-10-26 RX ADMIN — IBUPROFEN 800 MG: 800 TABLET, FILM COATED ORAL at 10:59

## 2022-10-26 RX ADMIN — ACETAMINOPHEN 1000 MG: 500 TABLET, FILM COATED ORAL at 08:27

## 2022-10-31 ENCOUNTER — OFFICE VISIT (OUTPATIENT)
Dept: OBSTETRICS AND GYNECOLOGY | Facility: CLINIC | Age: 32
End: 2022-10-31

## 2022-10-31 VITALS
DIASTOLIC BLOOD PRESSURE: 80 MMHG | WEIGHT: 209 LBS | SYSTOLIC BLOOD PRESSURE: 128 MMHG | HEIGHT: 62 IN | BODY MASS INDEX: 38.46 KG/M2

## 2022-10-31 DIAGNOSIS — Z09 POSTOP CHECK: Primary | ICD-10-CM

## 2022-10-31 PROCEDURE — 0503F POSTPARTUM CARE VISIT: CPT | Performed by: MIDWIFE

## 2022-10-31 NOTE — PROGRESS NOTES
"    Postpartum Progress Note    Patient name: Lorena Keen  Date of Service: 10/31/2022    ID: 32 y.o.     Diagnosis:   Chief Complaint   Patient presents with   • Postpartum Care     No Complaints/concerns      1 week post op  section  Patient Active Problem List   Diagnosis   • Carpal tunnel syndrome   • Depression   • Generalized anxiety disorder   • Gastroesophageal reflux disease   • Irritable bowel syndrome   • Migraine   • OCD (obsessive compulsive disorder)   • Thoracic myofascial strain   • Tobacco abuse   • Pregnancy   • Fetal intolerance to labor, delivered, current hospitalization       Subjective:      No complaints, Lochia light .  Ambulating, B&B habits wnl, tolerating regular diet  Pain well controlled. She is taking Tylenol and Ibuprofen  The patient is currently breastfeeding.       Objective:      Vital signs:  /80   Ht 157.5 cm (62\")   Wt 94.8 kg (209 lb)   LMP 2022   BMI 38.23 kg/m²    General: Alert & oriented x4, in no apparent distress  Abdomen: soft, nontender  Uterus: firm, nontender  Incision: healing well  Extremities: nontender; no edema      Labs:  Lab Results   Component Value Date    WBC 15.11 (H) 10/25/2022    HGB 9.6 (L) 10/25/2022    HCT 29.7 (L) 10/25/2022    MCV 84.9 10/25/2022     10/25/2022             Assessment/Plan:        1. S/p  delivery: Doing well.  2. Infant feeding: Supportive care.  The patient is currently breastfeeding.  3. Contraception: Discussed options for contraception  4. RTO 5 weeks     No orders of the defined types were placed in this encounter.      Africa Camilo CNM  10/31/2022    "

## 2022-12-05 ENCOUNTER — POSTPARTUM VISIT (OUTPATIENT)
Dept: OBSTETRICS AND GYNECOLOGY | Facility: CLINIC | Age: 32
End: 2022-12-05

## 2022-12-05 VITALS
HEIGHT: 62 IN | DIASTOLIC BLOOD PRESSURE: 70 MMHG | WEIGHT: 198 LBS | SYSTOLIC BLOOD PRESSURE: 118 MMHG | BODY MASS INDEX: 36.44 KG/M2

## 2022-12-05 PROBLEM — Z34.90 PREGNANCY: Status: RESOLVED | Noted: 2022-10-23 | Resolved: 2022-12-05

## 2022-12-05 PROCEDURE — 0503F POSTPARTUM CARE VISIT: CPT | Performed by: MIDWIFE

## 2022-12-05 NOTE — PROGRESS NOTES
"History  Chief Complaint   Patient presents with   • Postpartum Care     No Complaints/concerns         Lorena Keen is a 32 y.o. year old  presenting to be seen for her postpartum visit.  She had a Primary  (LTCS).    Since delivery she has been sexually active. She has used condoms.  She does not have concerns about post-partum blues/depression.   She is both breast and bottle feeding. She is pumping her breasts.  For ongoing contraception, her plans are condoms.  She has not had a menstrual cycle.         Physical  /70   Ht 157.5 cm (62\")   Wt 89.8 kg (198 lb)   Breastfeeding Yes Comment: with formula  BMI 36.21 kg/m²     General:  well developed; well nourished  no acute distress   Abdomen: soft, non-tender; no masses  incision is healed   Pelvis: External genitalia:  normal appearance of the external genitalia including Bartholin's and [place]'s glands.  Uterus:  normal size, shape and consistency.  Adnexa:  normal bimanual exam of the adnexa.        Assessment   1. Normal 6 week postpartum exam  2. Contraceptive management     Plan   1. BC options reviewed and compared today: condoms  2. Pap smear not due  3. Follow up 1 year    No orders of the defined types were placed in this encounter.         This note was electronically signed.  FALGUNI Carmichael  2022  "

## 2023-03-31 NOTE — PROGRESS NOTES
Chief Complaint   Patient presents with   • Routine Prenatal Visit     Prenatal visit with anatomy scan done today. No problems or concerns.        HPI:   , 19w5d gestation reports doing well    ROS:  See Prenatal Episode/Flowsheet  /62   Wt 95.5 kg (210 lb 9.6 oz)   LMP 2022   BMI 37.91 kg/m²      EXAM:  EXTREMITIES:  No swelling-See Prenatal Episode/Flowsheet    ABDOMEN:  FHTs/Movement noted-See Prenatal Episode/Flowsheet    URINE GLUCOSE/PROTEIN:  See Prenatal Episode/Flowsheet    PELVIC EXAM:  See Prenatal Episode/Flowsheet  CV:  Lungs:  GYN:    MDM:    Lab Results   Component Value Date    HGB 11.7 2022    RUBELLAABIGG 2.66 2022    HEPBSAG Negative 2022    ABO O 2022    RH Positive 2022    ABSCRN Negative 2022    [id number] Non Reactive 2022    HEPCVIRUSABY <0.1 2022       U/S: Anatomic survey is within normal limits though limited secondary to fetal positioning.  Vertex.  Anterior placenta.  Three-vessel cord.  Size consistent with dates.  Outflow tracts and profile not seen secondary to positioning    1. IUP 19w5d  2. Routine care   3.  ultrasound for outflow tracts and profile next time  4.  Due to patient's significant IBS we will forego the Glucola and after next appointment have her start checking her sugars for a bit   Cryotherapy Text: The wound bed was treated with cryotherapy after the biopsy was performed.

## 2023-11-28 ENCOUNTER — INITIAL PRENATAL (OUTPATIENT)
Dept: OBSTETRICS AND GYNECOLOGY | Facility: CLINIC | Age: 33
End: 2023-11-28
Payer: MEDICAID

## 2023-11-28 VITALS — WEIGHT: 202 LBS | BODY MASS INDEX: 36.95 KG/M2 | DIASTOLIC BLOOD PRESSURE: 60 MMHG | SYSTOLIC BLOOD PRESSURE: 108 MMHG

## 2023-11-28 DIAGNOSIS — O09.899 SHORT INTERVAL BETWEEN PREGNANCIES AFFECTING PREGNANCY, ANTEPARTUM: ICD-10-CM

## 2023-11-28 DIAGNOSIS — Z34.92 PRENATAL CARE IN SECOND TRIMESTER: Primary | ICD-10-CM

## 2023-11-28 DIAGNOSIS — Z98.891 PREVIOUS CESAREAN SECTION: ICD-10-CM

## 2023-11-28 RX ORDER — PRENATAL VIT NO.126/IRON/FOLIC 28MG-0.8MG
TABLET ORAL DAILY
COMMUNITY

## 2023-11-29 PROBLEM — O09.899 SHORT INTERVAL BETWEEN PREGNANCIES AFFECTING PREGNANCY, ANTEPARTUM: Status: ACTIVE | Noted: 2023-11-29

## 2023-11-29 PROBLEM — Z98.891 PREVIOUS CESAREAN SECTION: Status: ACTIVE | Noted: 2023-11-29

## 2023-11-29 NOTE — PROGRESS NOTES
New Pregnancy Visit    Subjective   Chief Complaint   Patient presents with    Initial Prenatal Visit     LMP 23, TVS done today 15w6d, last pap 22 WNL. No complaints       Lorena Keen is a 33 y.o. year old .  Patient's last menstrual period was 2023.  She presents to initiate prenatal care with our group today.     No major issues.  Recent  in 2022 for non-reassuring fetal status and protracted active phase of labor.    Social History    Tobacco Use      Smoking status: Every Day        Packs/day: .25        Types: Cigarettes      Smokeless tobacco: Never      Tobacco comments: 2016: Former Smoker      Current Outpatient Medications on File Prior to Visit   Medication Sig Dispense Refill    prenatal vitamin (prenatal, CLASSIC, vitamin) tablet Take  by mouth Daily.       No current facility-administered medications on file prior to visit.          Objective   /60   Wt 91.6 kg (202 lb)   LMP 2023   BMI 36.95 kg/m²   Physical Exam:  Normal, gestational age-appropriate exam today        Medical Decision Making:    Lab Review:   No data reviewed    Note Review:  Operative note 2022    Imaging Review:  Pelvic ultrasound report  IUP measuring 15+6 weeks with appropriate fetal cardiac activity.  SALEEM is set at 05/15/2024.  The cervix and bilateral ovaries are normal in appearance. No free fluid in the cul-de-sac.   Assessment   IUP at 15+6 weeks  Supervision of high risk pregnancy  Previous C/S with planned repeat C/S  Short interval pregnancy   BMI 37     Plan    The problem list for pregnancy was initiated today  Tests/Orders/Rx for today:  Orders Placed This Encounter   Procedures    Chlamydia trachomatis, Neisseria gonorrhoeae, PCR w/ confirmation - Urine, Urine, Clean Catch     Order Specific Question:   Release to patient     Answer:   Routine Release [[id number]]    OB Panel With HIV     Order Specific Question:   Release to patient      Answer:   Routine Release [[id number]]    Urine Drug Screen - Urine, Clean Catch     Order Specific Question:   Release to patient     Answer:   Routine Release [[id number]]       Medication Management: None    Testing for GC / Chlamydia / trichomonas was done today  Genetic testing reviewed: she will consider the information and make a decision at a later date.  Information reviewed: exercise in pregnancy, nutrition in pregnancy, weight gain in pregnancy, work and travel restrictions during pregnancy, list of OTC medications acceptable in pregnancy, and call coverage groups    Follow up: 4 week(s) for anatomy ultrasound.    Sim Logan MD  Obstetrics and Gynecology  [facility]

## 2023-12-01 LAB
ABO GROUP BLD: ABNORMAL
AMPHETAMINES UR QL SCN: NEGATIVE NG/ML
BARBITURATES UR QL SCN: NEGATIVE NG/ML
BASOPHILS # BLD AUTO: 0 X10E3/UL (ref 0–0.2)
BASOPHILS NFR BLD AUTO: 0 %
BENZODIAZ UR QL SCN: NEGATIVE NG/ML
BLD GP AB SCN SERPL QL: NEGATIVE
BZE UR QL SCN: NEGATIVE NG/ML
C TRACH RRNA SPEC QL NAA+PROBE: NEGATIVE
CANNABINOIDS UR QL SCN: NEGATIVE NG/ML
CREAT UR-MCNC: 71.5 MG/DL (ref 20–300)
EOSINOPHIL # BLD AUTO: 0 X10E3/UL (ref 0–0.4)
EOSINOPHIL NFR BLD AUTO: 0 %
ERYTHROCYTE [DISTWIDTH] IN BLOOD BY AUTOMATED COUNT: 13.4 % (ref 11.7–15.4)
HBV SURFACE AG SERPL QL IA: NEGATIVE
HCT VFR BLD AUTO: 39 % (ref 34–46.6)
HCV IGG SERPL QL IA: NON REACTIVE
HGB BLD-MCNC: 13.5 G/DL (ref 11.1–15.9)
HIV 1+2 AB+HIV1 P24 AG SERPL QL IA: NON REACTIVE
IMM GRANULOCYTES # BLD AUTO: 0.1 X10E3/UL (ref 0–0.1)
IMM GRANULOCYTES NFR BLD AUTO: 1 %
LABORATORY COMMENT REPORT: NORMAL
LYMPHOCYTES # BLD AUTO: 1.2 X10E3/UL (ref 0.7–3.1)
LYMPHOCYTES NFR BLD AUTO: 13 %
MCH RBC QN AUTO: 31 PG (ref 26.6–33)
MCHC RBC AUTO-ENTMCNC: 34.6 G/DL (ref 31.5–35.7)
MCV RBC AUTO: 89 FL (ref 79–97)
METHADONE UR QL SCN: NEGATIVE NG/ML
MONOCYTES # BLD AUTO: 0.4 X10E3/UL (ref 0.1–0.9)
MONOCYTES NFR BLD AUTO: 4 %
N GONORRHOEA RRNA SPEC QL NAA+PROBE: NEGATIVE
NEUTROPHILS # BLD AUTO: 7.4 X10E3/UL (ref 1.4–7)
NEUTROPHILS NFR BLD AUTO: 82 %
OPIATES UR QL SCN: NEGATIVE NG/ML
OXYCODONE+OXYMORPHONE UR QL SCN: NEGATIVE NG/ML
PCP UR QL: NEGATIVE NG/ML
PH UR: 6.3 [PH] (ref 4.5–8.9)
PLATELET # BLD AUTO: 291 X10E3/UL (ref 150–450)
PROPOXYPH UR QL SCN: NEGATIVE NG/ML
RBC # BLD AUTO: 4.36 X10E6/UL (ref 3.77–5.28)
RH BLD: POSITIVE
RPR SER QL: NON REACTIVE
RUBV IGG SERPL IA-ACNC: 2.87 INDEX
WBC # BLD AUTO: 9.1 X10E3/UL (ref 3.4–10.8)

## 2023-12-18 ENCOUNTER — ROUTINE PRENATAL (OUTPATIENT)
Dept: OBSTETRICS AND GYNECOLOGY | Facility: CLINIC | Age: 33
End: 2023-12-18
Payer: MEDICAID

## 2023-12-18 VITALS — SYSTOLIC BLOOD PRESSURE: 110 MMHG | WEIGHT: 206.8 LBS | BODY MASS INDEX: 37.82 KG/M2 | DIASTOLIC BLOOD PRESSURE: 68 MMHG

## 2023-12-18 DIAGNOSIS — Z34.92 SECOND TRIMESTER PREGNANCY: Primary | ICD-10-CM

## 2023-12-18 NOTE — PROGRESS NOTES
Chief Complaint   Patient presents with    Routine Prenatal Visit     Prenatal visit with Anatomy scan done today. No problems or concerns        HPI:   , 18w5d gestation reports doing well    ROS:  See Prenatal Episode/Flowsheet  /68   Wt 93.8 kg (206 lb 12.8 oz)   LMP 2023   BMI 37.82 kg/m²      EXAM:  EXTREMITIES:  No swelling-See Prenatal Episode/Flowsheet    ABDOMEN:  FHTs/Movement noted-See Prenatal Episode/Flowsheet    URINE GLUCOSE/PROTEIN:  See Prenatal Episode/Flowsheet    PELVIC EXAM:  See Prenatal Episode/Flowsheet  CV:  Lungs:  GYN:    MDM:    Lab Results   Component Value Date    HGB 13.5 2023    RUBELLAABIGG 2.87 2023    HEPBSAG Negative 2023    ABO O 2023    RH Positive 2023    ABSCRN Negative 2023    LLQ3MRB1 Non Reactive 2023    HEPCVIRUSABY Non Reactive 2023    STREPGPB Negative 10/13/2022       U/S: Anatomic survey is within normal limits.  Size is consistent with dates.  Active male fetus in the vertex position.  Anterior placenta.  Three-vessel cord.    1. IUP 18w5d  2. Routine care   3.  Anatomy today.  Penta screen.  4. R C/S 39 weeks

## 2023-12-20 LAB
2ND TRIMESTER 4 SCREEN SERPL-IMP: NORMAL
AFP ADJ MOM SERPL: 0.7
AFP SERPL-MCNC: 28.4 NG/ML
AGE AT DELIVERY: 32.2 YR
FET TS 18 RISK FROM MAT AGE: NORMAL
FET TS 21 RISK FROM MAT AGE: 510
GA METHOD: NORMAL
GA: 18.9 WEEKS
HCG ADJ MOM SERPL: 1.77
HCG SERPL-ACNC: NORMAL MIU/ML
IDDM PATIENT QL: NO
INHIBIN A ADJ MOM SERPL: 0.96
INHIBIN A SERPL-MCNC: 127.46 PG/ML
MULTIPLE PREGNANCY: NO
NEURAL TUBE DEFECT RISK FETUS: NORMAL %
SERVICE CMNT-IMP: NORMAL
TS 18 RISK FETUS: NORMAL
TS 21 RISK FETUS: 1762
U ESTRIOL ADJ MOM SERPL: 1.2
U ESTRIOL SERPL-MCNC: 1.95 NG/ML

## 2024-01-15 ENCOUNTER — ROUTINE PRENATAL (OUTPATIENT)
Dept: OBSTETRICS AND GYNECOLOGY | Facility: CLINIC | Age: 34
End: 2024-01-15
Payer: MEDICAID

## 2024-01-15 VITALS — SYSTOLIC BLOOD PRESSURE: 122 MMHG | BODY MASS INDEX: 39.69 KG/M2 | DIASTOLIC BLOOD PRESSURE: 72 MMHG | WEIGHT: 217 LBS

## 2024-01-15 DIAGNOSIS — Z98.891 PREVIOUS CESAREAN SECTION: ICD-10-CM

## 2024-01-15 DIAGNOSIS — O09.899 SHORT INTERVAL BETWEEN PREGNANCIES AFFECTING PREGNANCY, ANTEPARTUM: ICD-10-CM

## 2024-01-15 DIAGNOSIS — Z34.92 PRENATAL CARE IN SECOND TRIMESTER: Primary | ICD-10-CM

## 2024-01-15 PROCEDURE — 99213 OFFICE O/P EST LOW 20 MIN: CPT | Performed by: OBSTETRICS & GYNECOLOGY

## 2024-01-15 NOTE — PROGRESS NOTES
Prenatal Care Visit    Subjective   Chief Complaint   Patient presents with    Routine Prenatal Visit     No complaints       History:   Lorena is a  currently at 22w5d who presents for a prenatal care visit today.    No major issues.    Social History    Tobacco Use      Smoking status: Every Day        Packs/day: .25        Types: Cigarettes      Smokeless tobacco: Never      Tobacco comments: 2016: Former Smoker       Objective   /72   Wt 98.4 kg (217 lb)   LMP 2023   BMI 39.69 kg/m²   Physical Exam:  Normal, gestational age-appropriate exam today        Plan   Medical Decision Making:    I have reviewed the prenatal labs and ultrasound(s) today. I have reviewed the most recent prenatal progress note(s).    Diagnosis: Supervision of low risk pregnancy  Previous C/S with planned repeat C/S  Short interval pregnancy   IBS  GERD   Tests/Orders/Rx today: No orders of the defined types were placed in this encounter.      Medication Management: None     Topics discussed: Prenatal care milestones  Will check FSBG values at home  instead of Glucola  Tubal ligation   Tests next visit: none   Next visit: 3-4 week(s)     Sim Logan MD  Obstetrics and Gynecology  [facility]

## 2024-02-12 ENCOUNTER — ROUTINE PRENATAL (OUTPATIENT)
Dept: OBSTETRICS AND GYNECOLOGY | Facility: CLINIC | Age: 34
End: 2024-02-12
Payer: MEDICAID

## 2024-02-12 VITALS — WEIGHT: 222.4 LBS | SYSTOLIC BLOOD PRESSURE: 126 MMHG | BODY MASS INDEX: 40.68 KG/M2 | DIASTOLIC BLOOD PRESSURE: 80 MMHG

## 2024-02-12 DIAGNOSIS — Z34.92 SECOND TRIMESTER PREGNANCY: Primary | ICD-10-CM

## 2024-02-12 PROCEDURE — 99213 OFFICE O/P EST LOW 20 MIN: CPT | Performed by: OBSTETRICS & GYNECOLOGY

## 2024-02-12 RX ORDER — BLOOD-GLUCOSE METER
KIT MISCELLANEOUS
Qty: 1 EACH | Refills: 0 | Status: SHIPPED | OUTPATIENT
Start: 2024-02-12

## 2024-02-12 RX ORDER — LANCETS 28 GAUGE
EACH MISCELLANEOUS
Qty: 120 EACH | Refills: 12 | Status: SHIPPED | OUTPATIENT
Start: 2024-02-12

## 2024-02-26 ENCOUNTER — ROUTINE PRENATAL (OUTPATIENT)
Dept: OBSTETRICS AND GYNECOLOGY | Facility: CLINIC | Age: 34
End: 2024-02-26
Payer: MEDICAID

## 2024-02-26 VITALS — DIASTOLIC BLOOD PRESSURE: 70 MMHG | SYSTOLIC BLOOD PRESSURE: 122 MMHG | BODY MASS INDEX: 40.97 KG/M2 | WEIGHT: 224 LBS

## 2024-02-26 DIAGNOSIS — Z98.891 PREVIOUS CESAREAN SECTION: ICD-10-CM

## 2024-02-26 DIAGNOSIS — O09.899 SHORT INTERVAL BETWEEN PREGNANCIES AFFECTING PREGNANCY, ANTEPARTUM: Primary | ICD-10-CM

## 2024-02-26 PROCEDURE — 99214 OFFICE O/P EST MOD 30 MIN: CPT | Performed by: MIDWIFE

## 2024-02-26 RX ORDER — FAMOTIDINE 20 MG/1
20 TABLET, FILM COATED ORAL 2 TIMES DAILY PRN
Qty: 60 TABLET | Refills: 3 | Status: SHIPPED | OUTPATIENT
Start: 2024-02-26 | End: 2025-02-25

## 2024-02-26 NOTE — PROGRESS NOTES
Chief Complaint   Patient presents with    Routine Prenatal Visit     No Complaints/concerns        HPI: Lorena is a  currently at 28w5d here for prenatal visit who reports the following: baby is active. She denies any ctxs. She has been checking FBS and 1 hr pp glucose due to IBS and inability to do glucola. FBS , 1 hr pp mostly < 123, 1 @ 140. She hasn't changed her diet. She has been having to take TUMs during the night                EXAM:     Vitals:    24 1314   BP: 122/70      Abdomen:   See prenatal flowsheet as noted and reviewed, soft, nontender   Pelvic:  See prenatal flowsheet as noted and reviewed   Urine:  See prenatal flowsheet as noted and reviewed    Lab Results   Component Value Date    ABO O 2023    RH Positive 2023    ABSCRN Negative 2023       MDM:  Impression: Previous C/S with planned repeat C/S  Closely spaced pregnancies  GERD in pregnancy   Tests done today: none   Topics discussed: GERD management-Rx Pepcid BID  kick counts and fetal movement  Reviewed OB labs   Tests next visit: none                RTO:                        2 weeks    This note was electronically signed.  Africa Camilo, APRN  2024

## 2024-03-11 ENCOUNTER — ROUTINE PRENATAL (OUTPATIENT)
Dept: OBSTETRICS AND GYNECOLOGY | Facility: CLINIC | Age: 34
End: 2024-03-11
Payer: MEDICAID

## 2024-03-11 VITALS — WEIGHT: 228 LBS | BODY MASS INDEX: 41.7 KG/M2 | SYSTOLIC BLOOD PRESSURE: 126 MMHG | DIASTOLIC BLOOD PRESSURE: 68 MMHG

## 2024-03-11 DIAGNOSIS — K21.9 GASTROESOPHAGEAL REFLUX DISEASE WITHOUT ESOPHAGITIS: ICD-10-CM

## 2024-03-11 DIAGNOSIS — R42 DIZZY SPELLS: ICD-10-CM

## 2024-03-11 DIAGNOSIS — O99.810 ABNORMAL GLUCOSE IN PREGNANCY, ANTEPARTUM: ICD-10-CM

## 2024-03-11 DIAGNOSIS — Z34.83 ENCOUNTER FOR SUPERVISION OF OTHER NORMAL PREGNANCY, THIRD TRIMESTER: Primary | ICD-10-CM

## 2024-03-11 DIAGNOSIS — O09.899 SHORT INTERVAL BETWEEN PREGNANCIES AFFECTING PREGNANCY, ANTEPARTUM: ICD-10-CM

## 2024-03-11 DIAGNOSIS — Z98.891 PREVIOUS CESAREAN SECTION: ICD-10-CM

## 2024-03-11 PROCEDURE — 99214 OFFICE O/P EST MOD 30 MIN: CPT | Performed by: OBSTETRICS & GYNECOLOGY

## 2024-03-11 RX ORDER — PANTOPRAZOLE SODIUM 40 MG/1
40 TABLET, DELAYED RELEASE ORAL DAILY
Qty: 30 TABLET | Refills: 6 | Status: SHIPPED | OUTPATIENT
Start: 2024-03-11

## 2024-03-11 NOTE — PROGRESS NOTES
Chief Complaint  Routine Prenatal Visit (No complaints. )    History of Present Illness:  Lorena is a  currently at 30w5d who presents today with no significant complaints other than reflux.  Patient has been taking her Pepcid twice daily.  Patient has been checking her fasting glucose levels.  Her levels have been low 90s to 103.  Patient reports good fetal movement.  She denies any significant cramping or contractions.  She is desiring repeat  section.  She has been taking her prenatal vitamins.  She does report however having trouble remembering them.  She has been having dizzy spells as well.    Exam:  Vitals:  See prenatal flowsheet as noted and reviewed  General: Alert, cooperative, and does not appear in any distress  Abdomen:   See prenatal flowsheet as noted and reviewed    Uterus gravid, non-tender; no palpable masses    No guarding or rebound tenderness  Pelvic:  See prenatal flowsheet as noted and reviewed  Ext:  See prenatal flowsheet as noted and reviewed    Moves extremities well, no cyanosis and no redness  Urine:  See prenatal flowsheet as noted and reviewed    Data Review:  The following data was reviewed by: Kirsten Dent MD on 2024:  Prenatal Labs:  Lab Results   Component Value Date    HGB 13.5 2023    RUBELLAABIGG 2.87 2023    HEPBSAG Negative 2023    ABO O 2023    RH Positive 2023    ABSCRN Negative 2023    [id number] Non Reactive 2023    HEPCVIRUSABY Non Reactive 2023    STREPGPB Negative 10/13/2022       No visits with results within 1 Month(s) from this visit.   Latest known visit with results is:   Routine Prenatal on 2023   Component Date Value    Result 2023 Comment     AFP TETRA TEST RESULTS 2023 *Screen Negative*     Gestational Age 2023 18.9     Gestat. Age Based On 2023 Ultrasound     Maternal Age At SALEEM 2023 32.2     Race 2023      Weight 2023 207     Insulin  Dep Diabetes 2023 No     Multiple Gestation 2023 No     AFP Value 2023 28.4     MSAFP Mom 2023 0.70     HCG Value 2023 39,222     HCG MoM 2023 1.77     uE3 Value 2023 1.95     uE3 MoM 2023 1.20     LINDSEY Value (EIA) 2023 127.46     LINDSEY Mom Value 2023 0.96     OSBR Risk 1 IN 2023 10,000     DSR (Second Trimester) 1* 2023 1,762     DSR (By Age)    1 IN 2023 510     Tri 18 Risk At Term 2023 Not increased     T18 (By Age) 2023 1:     MSAFP Interp 2023 Comment     Comment 2023 Comment      Imaging:  US Ob 14 + Weeks Single or First Gestation  Anatomic survey is within normal limits.  Size is consistent with dates.    Active male fetus in the vertex position.  Anterior placenta.    Three-vessel cord.     Medical Records:  None    Assessment and Plan:  Problem List Items Addressed This Visit          Gastrointestinal Abdominal     Gastroesophageal reflux disease (Chronic)  Prescription is given for Protonix as noted.  Patient is to call if worsening and/or no changes in her symptoms.    Relevant Medications    pantoprazole (PROTONIX) 40 MG EC tablet       Gravid and     Previous  section    Short interval between pregnancies affecting pregnancy, antepartum     Other Visit Diagnoses       Encounter for supervision of other normal pregnancy, third trimester    -  Primary  Topics discussed:      section risks, benefits  GERD management  iron supplementation  kick counts and fetal movement  PIH precautions   labor signs and symptoms  Scan for growth next visit    Relevant Orders    US Ob Follow Up Transabdominal Approach    Dizzy spells      CBC is noted.    Relevant Orders    CBC & Differential    Abnormal glucose in pregnancy, antepartum      Patient to continue monitoring her glucose levels as discussed.  Patient with occasional elevated fasting levels.          Follow  Up/Instructions:  Follow up as scheduled.  Patient was given instructions and counseling regarding her condition or for health maintenance advice. Please see specific information pulled into the AVS if appropriate.     Note: Speech recognition transcription software may have been used to dictate portions of this document.  An attempt at proofreading has been made though minor errors in transcription may still be present.    This note was electronically signed.  Kirsten Dent M.D.

## 2024-03-12 LAB
BASOPHILS # BLD AUTO: 0.03 10*3/MM3 (ref 0–0.2)
BASOPHILS NFR BLD AUTO: 0.2 % (ref 0–1.5)
EOSINOPHIL # BLD AUTO: 0.02 10*3/MM3 (ref 0–0.4)
EOSINOPHIL NFR BLD AUTO: 0.2 % (ref 0.3–6.2)
ERYTHROCYTE [DISTWIDTH] IN BLOOD BY AUTOMATED COUNT: 12.7 % (ref 12.3–15.4)
HCT VFR BLD AUTO: 35.2 % (ref 34–46.6)
HGB BLD-MCNC: 11.7 G/DL (ref 12–15.9)
IMM GRANULOCYTES # BLD AUTO: 0.08 10*3/MM3 (ref 0–0.05)
IMM GRANULOCYTES NFR BLD AUTO: 0.6 % (ref 0–0.5)
LYMPHOCYTES # BLD AUTO: 1.19 10*3/MM3 (ref 0.7–3.1)
LYMPHOCYTES NFR BLD AUTO: 9.2 % (ref 19.6–45.3)
MCH RBC QN AUTO: 29 PG (ref 26.6–33)
MCHC RBC AUTO-ENTMCNC: 33.2 G/DL (ref 31.5–35.7)
MCV RBC AUTO: 87.3 FL (ref 79–97)
MONOCYTES # BLD AUTO: 0.57 10*3/MM3 (ref 0.1–0.9)
MONOCYTES NFR BLD AUTO: 4.4 % (ref 5–12)
NEUTROPHILS # BLD AUTO: 11.06 10*3/MM3 (ref 1.7–7)
NEUTROPHILS NFR BLD AUTO: 85.4 % (ref 42.7–76)
NRBC BLD AUTO-RTO: 0 /100 WBC (ref 0–0.2)
PLATELET # BLD AUTO: 317 10*3/MM3 (ref 140–450)
RBC # BLD AUTO: 4.03 10*6/MM3 (ref 3.77–5.28)
WBC # BLD AUTO: 12.95 10*3/MM3 (ref 3.4–10.8)

## 2024-03-15 ENCOUNTER — HOSPITAL ENCOUNTER (OUTPATIENT)
Facility: HOSPITAL | Age: 34
Discharge: HOME | End: 2024-03-15
Attending: MIDWIFE | Admitting: MIDWIFE
Payer: MEDICAID

## 2024-03-15 VITALS
WEIGHT: 229.6 LBS | OXYGEN SATURATION: 97 % | TEMPERATURE: 98.4 F | RESPIRATION RATE: 16 BRPM | DIASTOLIC BLOOD PRESSURE: 51 MMHG | HEIGHT: 62 IN | SYSTOLIC BLOOD PRESSURE: 95 MMHG | BODY MASS INDEX: 42.25 KG/M2 | HEART RATE: 99 BPM

## 2024-03-15 LAB
BILIRUB BLD-MCNC: NEGATIVE MG/DL
CLARITY, POC: CLEAR
COLOR UR: NORMAL
GLUCOSE UR STRIP-MCNC: NEGATIVE MG/DL
KETONES UR QL: NEGATIVE
LEUKOCYTE EST, POC: NEGATIVE
NITRITE UR-MCNC: NEGATIVE MG/ML
PH UR: 7 [PH] (ref 5–8)
PROT UR STRIP-MCNC: NEGATIVE MG/DL
RBC # UR STRIP: NEGATIVE /UL
SP GR UR: 1.01 (ref 1–1.03)
UROBILINOGEN UR QL: NORMAL

## 2024-03-15 PROCEDURE — 81002 URINALYSIS NONAUTO W/O SCOPE: CPT | Performed by: MIDWIFE

## 2024-03-15 PROCEDURE — G0463 HOSPITAL OUTPT CLINIC VISIT: HCPCS

## 2024-03-15 RX ORDER — SODIUM CHLORIDE 0.9 % (FLUSH) 0.9 %
10 SYRINGE (ML) INJECTION AS NEEDED
Status: DISCONTINUED | OUTPATIENT
Start: 2024-03-15 | End: 2024-03-15 | Stop reason: HOSPADM

## 2024-03-15 RX ORDER — SODIUM CHLORIDE 0.9 % (FLUSH) 0.9 %
10 SYRINGE (ML) INJECTION EVERY 12 HOURS SCHEDULED
Status: DISCONTINUED | OUTPATIENT
Start: 2024-03-15 | End: 2024-03-15 | Stop reason: HOSPADM

## 2024-03-15 RX ORDER — FERROUS GLUCONATE 324(38)MG
324 TABLET ORAL
COMMUNITY

## 2024-03-15 RX ORDER — SODIUM CHLORIDE 9 MG/ML
40 INJECTION, SOLUTION INTRAVENOUS AS NEEDED
Status: DISCONTINUED | OUTPATIENT
Start: 2024-03-15 | End: 2024-03-15 | Stop reason: HOSPADM

## 2024-03-15 RX ORDER — LIDOCAINE HYDROCHLORIDE 10 MG/ML
0.5 INJECTION, SOLUTION INFILTRATION; PERINEURAL ONCE AS NEEDED
Status: DISCONTINUED | OUTPATIENT
Start: 2024-03-15 | End: 2024-03-15 | Stop reason: HOSPADM

## 2024-03-15 NOTE — NON STRESS TEST
Triage Note - Nursing Documentation  Labor and Delivery Admission Log    Lorena Keen  : 1990  MRN: [id number]  CSN: [id number]    Date in / Time in:  3/15/2024  Time in: 1504    Date out / Time out:    Time out: 1618    Nurse: Malina Decker RN    Patient Info: She is a 34 y.o. year old  at 31w2d with an SALEEM of 5/15/2024, by Last Menstrual Period who was seen on the [facility] [place].    Chief Complaint:   Chief Complaint   Patient presents with    Vaginal Bleeding     Pt verbalized she thinks she lost her mucous plug today        Provider Instructions / Disposition: po hydrated, VE per FALGUNI Garza, Active fetus, discharged home    Patient Active Problem List   Diagnosis    Carpal tunnel syndrome    Depression    Generalized anxiety disorder    Gastroesophageal reflux disease    Irritable bowel syndrome    Migraine    OCD (obsessive compulsive disorder)    Thoracic myofascial strain    Tobacco abuse    Previous  section    Short interval between pregnancies affecting pregnancy, antepartum       NST Documentation (Only applicable > 32 weeks):

## 2024-03-26 ENCOUNTER — ROUTINE PRENATAL (OUTPATIENT)
Dept: OBSTETRICS AND GYNECOLOGY | Facility: CLINIC | Age: 34
End: 2024-03-26
Payer: MEDICAID

## 2024-03-26 VITALS — DIASTOLIC BLOOD PRESSURE: 74 MMHG | SYSTOLIC BLOOD PRESSURE: 126 MMHG | WEIGHT: 230 LBS | BODY MASS INDEX: 42.07 KG/M2

## 2024-03-26 DIAGNOSIS — Z98.891 PREVIOUS CESAREAN SECTION: ICD-10-CM

## 2024-03-26 DIAGNOSIS — O09.899 SHORT INTERVAL BETWEEN PREGNANCIES AFFECTING PREGNANCY, ANTEPARTUM: ICD-10-CM

## 2024-03-26 DIAGNOSIS — Z36.89 ENCOUNTER FOR ULTRASOUND TO ASSESS FETAL GROWTH: ICD-10-CM

## 2024-03-26 DIAGNOSIS — Z34.93 PRENATAL CARE IN THIRD TRIMESTER: Primary | ICD-10-CM

## 2024-03-30 NOTE — PROGRESS NOTES
Prenatal Care Visit    Subjective   Chief Complaint   Patient presents with    Routine Prenatal Visit     Growth scan, fell on Saturday but did not land on stomach.        History:   Lorena is a  currently at 32w6d who presents for a prenatal care visit today.    No major issues.    Social History    Tobacco Use      Smoking status: Former        Packs/day: 0.25        Types: Cigarettes      Smokeless tobacco: Never      Tobacco comments: 2016: Former Smoker       Objective   /74   Wt 104 kg (230 lb)   LMP 2023   BMI 42.07 kg/m²   Physical Exam:  Normal, gestational age-appropriate exam today        Plan   Medical Decision Making:    I have reviewed the prenatal labs and ultrasound(s) today. I have reviewed the most recent prenatal progress note(s).    Diagnosis: Supervision of low risk pregnancy  Previous C/S with planned repeat C/S  Short interval pregnancy   IBS  GERD   Tests/Orders/Rx today: No orders of the defined types were placed in this encounter.      Medication Management: None     Topics discussed: Prenatal care milestones  Ultrasound findings  Tubal ligation  Fetal kick counts  PIH precautions   labor precautions   Tests next visit: none   Next visit: 2 week(s)     Sim Logan MD  Obstetrics and Gynecology  [facility]

## 2024-04-08 ENCOUNTER — PREP FOR SURGERY (OUTPATIENT)
Dept: OTHER | Facility: HOSPITAL | Age: 34
End: 2024-04-08
Payer: MEDICAID

## 2024-04-08 ENCOUNTER — ROUTINE PRENATAL (OUTPATIENT)
Dept: OBSTETRICS AND GYNECOLOGY | Facility: CLINIC | Age: 34
End: 2024-04-08
Payer: MEDICAID

## 2024-04-08 VITALS — SYSTOLIC BLOOD PRESSURE: 126 MMHG | WEIGHT: 233 LBS | BODY MASS INDEX: 42.62 KG/M2 | DIASTOLIC BLOOD PRESSURE: 68 MMHG

## 2024-04-08 DIAGNOSIS — K59.00 CONSTIPATION, UNSPECIFIED CONSTIPATION TYPE: ICD-10-CM

## 2024-04-08 DIAGNOSIS — K21.9 GASTROESOPHAGEAL REFLUX DISEASE WITHOUT ESOPHAGITIS: ICD-10-CM

## 2024-04-08 DIAGNOSIS — O09.899 SHORT INTERVAL BETWEEN PREGNANCIES AFFECTING PREGNANCY, ANTEPARTUM: ICD-10-CM

## 2024-04-08 DIAGNOSIS — Z34.83 ENCOUNTER FOR SUPERVISION OF OTHER NORMAL PREGNANCY, THIRD TRIMESTER: Primary | ICD-10-CM

## 2024-04-08 DIAGNOSIS — Z98.891 HISTORY OF C-SECTION: ICD-10-CM

## 2024-04-08 DIAGNOSIS — R10.10 UPPER ABDOMINAL PAIN: ICD-10-CM

## 2024-04-08 DIAGNOSIS — O99.810 ABNORMAL GLUCOSE IN PREGNANCY, ANTEPARTUM: ICD-10-CM

## 2024-04-08 DIAGNOSIS — Z3A.39 39 WEEKS GESTATION OF PREGNANCY: Primary | ICD-10-CM

## 2024-04-08 DIAGNOSIS — D50.9 IRON DEFICIENCY ANEMIA DURING PREGNANCY: ICD-10-CM

## 2024-04-08 DIAGNOSIS — O36.63X0 EXCESSIVE FETAL GROWTH AFFECTING MANAGEMENT OF PREGNANCY IN THIRD TRIMESTER, SINGLE OR UNSPECIFIED FETUS: ICD-10-CM

## 2024-04-08 DIAGNOSIS — R42 DIZZY SPELLS: ICD-10-CM

## 2024-04-08 DIAGNOSIS — O99.019 IRON DEFICIENCY ANEMIA DURING PREGNANCY: ICD-10-CM

## 2024-04-08 PROBLEM — Z34.90 PREGNANCY: Status: ACTIVE | Noted: 2024-04-08

## 2024-04-08 PROCEDURE — 99214 OFFICE O/P EST MOD 30 MIN: CPT | Performed by: OBSTETRICS & GYNECOLOGY

## 2024-04-08 RX ORDER — KETOROLAC TROMETHAMINE 30 MG/ML
30 INJECTION, SOLUTION INTRAMUSCULAR; INTRAVENOUS ONCE
OUTPATIENT
Start: 2024-04-08 | End: 2024-04-08

## 2024-04-08 RX ORDER — OXYTOCIN/0.9 % SODIUM CHLORIDE 30/500 ML
333 PLASTIC BAG, INJECTION (ML) INTRAVENOUS ONCE
OUTPATIENT
Start: 2024-04-08 | End: 2024-04-08

## 2024-04-08 RX ORDER — ACETAMINOPHEN 500 MG
1000 TABLET ORAL ONCE
OUTPATIENT
Start: 2024-04-08 | End: 2024-04-08

## 2024-04-08 RX ORDER — OXYTOCIN/0.9 % SODIUM CHLORIDE 30/500 ML
42 PLASTIC BAG, INJECTION (ML) INTRAVENOUS AS NEEDED
OUTPATIENT
Start: 2024-04-08 | End: 2024-04-09

## 2024-04-08 RX ORDER — SODIUM CHLORIDE 0.9 % (FLUSH) 0.9 %
10 SYRINGE (ML) INJECTION AS NEEDED
OUTPATIENT
Start: 2024-04-08

## 2024-04-08 RX ORDER — FERROUS GLUCONATE 324(38)MG
324 TABLET ORAL
Qty: 30 TABLET | Refills: 3 | Status: SHIPPED | OUTPATIENT
Start: 2024-04-08

## 2024-04-08 RX ORDER — SODIUM CHLORIDE, SODIUM LACTATE, POTASSIUM CHLORIDE, CALCIUM CHLORIDE 600; 310; 30; 20 MG/100ML; MG/100ML; MG/100ML; MG/100ML
125 INJECTION, SOLUTION INTRAVENOUS CONTINUOUS
OUTPATIENT
Start: 2024-04-08

## 2024-04-08 RX ORDER — CITRIC ACID/SODIUM CITRATE 334-500MG
30 SOLUTION, ORAL ORAL ONCE
OUTPATIENT
Start: 2024-04-08 | End: 2024-04-08

## 2024-04-08 RX ORDER — FAMOTIDINE 10 MG/ML
20 INJECTION, SOLUTION INTRAVENOUS ONCE
OUTPATIENT
Start: 2024-04-08 | End: 2024-04-08

## 2024-04-08 RX ORDER — CARBOPROST TROMETHAMINE 250 UG/ML
250 INJECTION, SOLUTION INTRAMUSCULAR AS NEEDED
OUTPATIENT
Start: 2024-04-08

## 2024-04-08 RX ORDER — SODIUM CHLORIDE 0.9 % (FLUSH) 0.9 %
10 SYRINGE (ML) INJECTION EVERY 12 HOURS SCHEDULED
OUTPATIENT
Start: 2024-04-08

## 2024-04-08 RX ORDER — MISOPROSTOL 200 UG/1
800 TABLET ORAL AS NEEDED
OUTPATIENT
Start: 2024-04-08

## 2024-04-08 RX ORDER — BUPIVACAINE HCL/0.9 % NACL/PF 0.1 %
2 PLASTIC BAG, INJECTION (ML) EPIDURAL ONCE
OUTPATIENT
Start: 2024-04-08 | End: 2024-04-08

## 2024-04-08 RX ORDER — SODIUM CHLORIDE 9 MG/ML
40 INJECTION, SOLUTION INTRAVENOUS AS NEEDED
OUTPATIENT
Start: 2024-04-08

## 2024-04-08 RX ORDER — LIDOCAINE HYDROCHLORIDE 10 MG/ML
0.5 INJECTION, SOLUTION INFILTRATION; PERINEURAL ONCE AS NEEDED
OUTPATIENT
Start: 2024-04-08

## 2024-04-08 RX ORDER — METHYLERGONOVINE MALEATE 0.2 MG/ML
200 INJECTION INTRAVENOUS ONCE AS NEEDED
OUTPATIENT
Start: 2024-04-08

## 2024-04-08 NOTE — PROGRESS NOTES
Chief Complaint  Routine Prenatal Visit (Patient complains of nausea and stomach pain. )    History of Present Illness:  Lorena is a  currently at 34w5d who presents today with complaints of upper abdominal pain as well as nausea.  Patient has been taking her medication for reflux.  She does report improvement in her reflux.  She reports having the onset of symptoms since starting her iron.  She has been taking iron sulfate over-the-counter.  She does report having constipation as well.  She has not been on any stool softeners.  She denies any significant cramping or contractions.  She has not had any vaginal bleeding or spotting.  She continues to monitor her glucose levels at home.  She reports her fasting glucose has been in the 90s.  She is desiring repeat  section.    Exam:  Vitals:  See prenatal flowsheet as noted and reviewed  General: Alert, cooperative, and does not appear in any distress  Abdomen:   See prenatal flowsheet as noted and reviewed    Uterus gravid, non-tender; no palpable masses    No guarding or rebound tenderness  Pelvic:  See prenatal flowsheet as noted and reviewed  Ext:  See prenatal flowsheet as noted and reviewed    Moves extremities well, no cyanosis and no redness  Urine:  See prenatal flowsheet as noted and reviewed    Data Review:  The following data was reviewed by: Kirsten Dent MD on 2024:  Prenatal Labs:  Lab Results   Component Value Date    HGB 11.7 (L) 2024    RUBELLAABIGG 2.87 2023    HEPBSAG Negative 2023    ABO O 2023    RH Positive 2023    ABSCRN Negative 2023    [id number] Non Reactive 2023    HEPCVIRUSABY Non Reactive 2023    STREPGPB Negative 10/13/2022       Admission on 03/15/2024, Discharged on 03/15/2024   Component Date Value    Color 03/15/2024 Straw     Clarity, UA 03/15/2024 Clear     Glucose, UA 03/15/2024 Negative     Bilirubin 03/15/2024 Negative     Ketones, UA 03/15/2024 Negative      Specific Gravity  03/15/2024 1.010     Blood, UA 03/15/2024 Negative     pH, Urine 03/15/2024 7.0     Protein, POC 03/15/2024 Negative     Urobilinogen, UA 03/15/2024 Normal     Leukocytes 03/15/2024 Negative     Nitrite, UA 03/15/2024 Negative    Routine Prenatal on 2024   Component Date Value    WBC 2024 12.95 (H)     RBC 2024 4.03     Hemoglobin 2024 11.7 (L)     Hematocrit 2024 35.2     MCV 2024 87.3     MCH 2024 29.0     MCHC 2024 33.2     RDW 2024 12.7     Platelets 2024 317     Neutrophil Rel % 2024 85.4 (H)     Lymphocyte Rel % 2024 9.2 (L)     Monocyte Rel % 2024 4.4 (L)     Eosinophil Rel % 2024 0.2 (L)     Basophil Rel % 2024 0.2     Neutrophils Absolute 2024 11.06 (H)     Lymphocytes Absolute 2024 1.19     Monocytes Absolute 2024 0.57     Eosinophils Absolute 2024 0.02     Basophils Absolute 2024 0.03     Immature Granulocyte Rel* 2024 0.6 (H)     Immature Grans Absolute 2024 0.08 (H)     nRBC 2024 0.0      Imaging:  US Ob Follow Up Transabdominal Approach  Impression:   IUP at 32+6 weeks. Limited anatomy was reviewed today and is normal in   appearance. EFW 2659g(97%) AC >97.7%. Cephalic presentation. Placenta is   anterior. Amniotic fluid and fetal heart rate are normal.    Sim Logan MD  Obstetrics and Gynecology  [facility]      Medical Records:  None    Assessment and Plan:  Problem List Items Addressed This Visit          Gastrointestinal Abdominal     Gastroesophageal reflux disease (Chronic)       Gravid and     Short interval between pregnancies affecting pregnancy, antepartum     Other Visit Diagnoses       Encounter for supervision of other normal pregnancy, third trimester    -  Primary  Topics discussed:      section risks, benefits  GERD management  glucose management  iron supplementation  kick counts and fetal  movement  PIH precautions   labor signs and symptoms  Scan for growth and biophysical profile    Relevant Orders    US Ob Follow Up Transabdominal Approach    US Fetal Biophysical Profile;Without Non-Stress Testing    Dizzy spells      Patient is to continue her iron supplements.  Prescription is given for ferrous gluconate.    Relevant Medications    ferrous gluconate (FERGON) 324 MG tablet    Upper abdominal pain      Recommend change in iron supplements.  If continued pain then patient may discontinue.  Patient is instructed in stool softeners as well.    Constipation, unspecified constipation type      Recommend Colace twice daily.    Abnormal glucose in pregnancy, antepartum        Relevant Orders    US Ob Follow Up Transabdominal Approach    US Fetal Biophysical Profile;Without Non-Stress Testing    Excessive fetal growth affecting management of pregnancy in third trimester, single or unspecified fetus      Repeat imaging as discussed.    Relevant Orders    US Ob Follow Up Transabdominal Approach    US Fetal Biophysical Profile;Without Non-Stress Testing    Iron deficiency anemia during pregnancy        Relevant Medications    ferrous gluconate (FERGON) 324 MG tablet    History of       Patient to schedule repeat  section as noted.          Follow Up/Instructions:  Follow up as scheduled.  Patient was given instructions and counseling regarding her condition or for health maintenance advice. Please see specific information pulled into the AVS if appropriate.     Note: Speech recognition transcription software may have been used to dictate portions of this document.  An attempt at proofreading has been made though minor errors in transcription may still be present.    This note was electronically signed.  Kirsten Dent M.D.

## 2024-04-15 ENCOUNTER — TELEPHONE (OUTPATIENT)
Dept: OBSTETRICS AND GYNECOLOGY | Facility: CLINIC | Age: 34
End: 2024-04-15
Payer: MEDICAID

## 2024-04-15 RX ORDER — ONDANSETRON 8 MG/1
8 TABLET, ORALLY DISINTEGRATING ORAL EVERY 8 HOURS PRN
Qty: 12 TABLET | Refills: 0 | Status: SHIPPED | OUTPATIENT
Start: 2024-04-15

## 2024-04-15 NOTE — TELEPHONE ENCOUNTER
Caller: Lorena Keen    Relationship to patient: Self    Best call back number: [phone]    Type of visit: OB F/U    Requested date: 24 -  24    If rescheduling, when is the original appointment: 4/15/24     Additional notes: PATIENT HAS A  TO ATTEND TODAY AND NEEDS TO R/S APPT - HUB UNABLE TO SCHEDULE THIS WEEK - HUB UNABLE TO WARM TRANSFER    ALSO, PATIENT WOULD LIKE TO KNOW IF A PRESCRIPTION FOR NAUSEA CAN BE SENT TO THE [facility] AT [address] [place] [address]

## 2024-04-22 ENCOUNTER — ROUTINE PRENATAL (OUTPATIENT)
Dept: OBSTETRICS AND GYNECOLOGY | Facility: CLINIC | Age: 34
End: 2024-04-22
Payer: MEDICAID

## 2024-04-22 VITALS — SYSTOLIC BLOOD PRESSURE: 138 MMHG | BODY MASS INDEX: 42.95 KG/M2 | WEIGHT: 234.8 LBS | DIASTOLIC BLOOD PRESSURE: 72 MMHG

## 2024-04-22 DIAGNOSIS — Z36.85 ENCOUNTER FOR ANTENATAL SCREENING FOR STREPTOCOCCUS B: Primary | ICD-10-CM

## 2024-04-22 PROCEDURE — 99213 OFFICE O/P EST LOW 20 MIN: CPT | Performed by: OBSTETRICS & GYNECOLOGY

## 2024-04-22 NOTE — PROGRESS NOTES
Chief Complaint   Patient presents with    Routine Prenatal Visit     Prenatal visit with GBS done today. No problems or concerns        HPI:   , 36w5d gestation reports doing well    ROS:  See Prenatal Episode/Flowsheet  /72   Wt 107 kg (234 lb 12.8 oz)   LMP 2023   BMI 42.95 kg/m²      EXAM:  EXTREMITIES:  No swelling-See Prenatal Episode/Flowsheet    ABDOMEN:  FHTs/Movement noted-See Prenatal Episode/Flowsheet    URINE GLUCOSE/PROTEIN:  See Prenatal Episode/Flowsheet    PELVIC EXAM:  See Prenatal Episode/Flowsheet  CV:  Lungs:  GYN:    MDM:    Lab Results   Component Value Date    HGB 11.7 (L) 2024    RUBELLAABIGG 2.87 2023    HEPBSAG Negative 2023    ABO O 2023    RH Positive 2023    ABSCRN Negative 2023    [id number] Non Reactive 2023    HEPCVIRUSABY Non Reactive 2023    STREPGPB Negative 10/13/2022       U/S:US Ob Follow Up Transabdominal Approach (2024 15:22)     1. IUP 36w5d  2. Routine care   3. FSBS WNL when checked for 2 months in late 2nd early third TM  4. R C/S scheduled

## 2024-04-24 LAB — GP B STREP DNA SPEC QL NAA+PROBE: NEGATIVE

## 2024-04-29 ENCOUNTER — ROUTINE PRENATAL (OUTPATIENT)
Dept: OBSTETRICS AND GYNECOLOGY | Facility: CLINIC | Age: 34
End: 2024-04-29
Payer: MEDICAID

## 2024-04-29 VITALS — WEIGHT: 234 LBS | BODY MASS INDEX: 42.8 KG/M2 | DIASTOLIC BLOOD PRESSURE: 80 MMHG | SYSTOLIC BLOOD PRESSURE: 112 MMHG

## 2024-04-29 DIAGNOSIS — Z34.93 THIRD TRIMESTER PREGNANCY: Primary | ICD-10-CM

## 2024-04-29 PROCEDURE — 0502F SUBSEQUENT PRENATAL CARE: CPT | Performed by: OBSTETRICS & GYNECOLOGY

## 2024-04-29 NOTE — PROGRESS NOTES
Chief Complaint   Patient presents with    Routine Prenatal Visit     Prenatal visit with no problems or concerns.        HPI:   , 37w5d gestation reports doing well    ROS:  See Prenatal Episode/Flowsheet  /80   Wt 106 kg (234 lb)   LMP 2023   BMI 42.80 kg/m²      EXAM:  EXTREMITIES:  No swelling-See Prenatal Episode/Flowsheet    ABDOMEN:  FHTs/Movement noted-See Prenatal Episode/Flowsheet    URINE GLUCOSE/PROTEIN:  See Prenatal Episode/Flowsheet    PELVIC EXAM:  See Prenatal Episode/Flowsheet  CV:  Lungs:  GYN:    MDM:    Lab Results   Component Value Date    HGB 11.7 (L) 2024    RUBELLAABIGG 2.87 2023    HEPBSAG Negative 2023    ABO O 2023    RH Positive 2023    ABSCRN Negative 2023    [id number] Non Reactive 2023    HEPCVIRUSABY Non Reactive 2023    STREPGPB Negative 2024       U/S:US Ob Follow Up Transabdominal Approach (2024 15:22)     1. IUP 37w5d  2. Routine care   3. R C/S next week

## 2024-05-02 ENCOUNTER — TELEPHONE (OUTPATIENT)
Dept: OBSTETRICS AND GYNECOLOGY | Facility: CLINIC | Age: 34
End: 2024-05-02
Payer: MEDICAID

## 2024-05-02 NOTE — TELEPHONE ENCOUNTER
Caller: Lorena Keen    Relationship: Self    Best call back number: [phone]    What is the best time to reach you: ANY    Who are you requesting to speak with (clinical staff, provider,  specific staff member): CLINICAL    What was the call regarding:   PATIENT WAS PRESCRIBED POLYTRIM EYE DROPS FOR PINK EYE. SHE IS WANTING TO KNOW IF THIS IS A SAFE MEDICATION TO TAKE WHILE PREGNANT AND WOULD LIKE A CALL BACK.

## 2024-05-06 ENCOUNTER — ROUTINE PRENATAL (OUTPATIENT)
Dept: OBSTETRICS AND GYNECOLOGY | Facility: CLINIC | Age: 34
End: 2024-05-06
Payer: MEDICAID

## 2024-05-06 VITALS — SYSTOLIC BLOOD PRESSURE: 120 MMHG | WEIGHT: 236.8 LBS | DIASTOLIC BLOOD PRESSURE: 72 MMHG | BODY MASS INDEX: 43.31 KG/M2

## 2024-05-06 DIAGNOSIS — Z34.93 THIRD TRIMESTER PREGNANCY: Primary | ICD-10-CM

## 2024-05-06 PROCEDURE — 99213 OFFICE O/P EST LOW 20 MIN: CPT | Performed by: OBSTETRICS & GYNECOLOGY

## 2024-05-10 ENCOUNTER — ANESTHESIA EVENT (OUTPATIENT)
Dept: PERIOP | Facility: HOSPITAL | Age: 34
End: 2024-05-10
Payer: MEDICAID

## 2024-05-10 ENCOUNTER — ANESTHESIA (OUTPATIENT)
Dept: PERIOP | Facility: HOSPITAL | Age: 34
End: 2024-05-10
Payer: MEDICAID

## 2024-05-10 ENCOUNTER — HOSPITAL ENCOUNTER (INPATIENT)
Facility: HOSPITAL | Age: 34
LOS: 2 days | Discharge: HOME | End: 2024-05-12
Attending: OBSTETRICS & GYNECOLOGY | Admitting: OBSTETRICS & GYNECOLOGY
Payer: MEDICAID

## 2024-05-10 DIAGNOSIS — Z3A.39 39 WEEKS GESTATION OF PREGNANCY: ICD-10-CM

## 2024-05-10 DIAGNOSIS — Z98.891 PREVIOUS CESAREAN SECTION: Primary | ICD-10-CM

## 2024-05-10 LAB
ABO GROUP BLD: NORMAL
BACTERIA UR QL AUTO: ABNORMAL /HPF
BASOPHILS # BLD AUTO: 0.02 10*3/MM3 (ref 0–0.2)
BASOPHILS NFR BLD AUTO: 0.2 % (ref 0–1.5)
BILIRUB UR QL STRIP: NEGATIVE
BLD GP AB SCN SERPL QL: NEGATIVE
CLARITY UR: CLEAR
COLOR UR: YELLOW
DEPRECATED RDW RBC AUTO: 49.5 FL (ref 37–54)
EOSINOPHIL # BLD AUTO: 0.04 10*3/MM3 (ref 0–0.4)
EOSINOPHIL NFR BLD AUTO: 0.4 % (ref 0.3–6.2)
ERYTHROCYTE [DISTWIDTH] IN BLOOD BY AUTOMATED COUNT: 15.9 % (ref 12.3–15.4)
GLUCOSE BLDC GLUCOMTR-MCNC: 93 MG/DL (ref 70–130)
GLUCOSE UR STRIP-MCNC: NEGATIVE MG/DL
HCT VFR BLD AUTO: 37.3 % (ref 34–46.6)
HGB BLD-MCNC: 12.5 G/DL (ref 12–15.9)
HGB UR QL STRIP.AUTO: NEGATIVE
HYALINE CASTS UR QL AUTO: ABNORMAL /LPF
IMM GRANULOCYTES # BLD AUTO: 0.07 10*3/MM3 (ref 0–0.05)
IMM GRANULOCYTES NFR BLD AUTO: 0.6 % (ref 0–0.5)
KETONES UR QL STRIP: NEGATIVE
LEUKOCYTE ESTERASE UR QL STRIP.AUTO: ABNORMAL
LYMPHOCYTES # BLD AUTO: 1.86 10*3/MM3 (ref 0.7–3.1)
LYMPHOCYTES NFR BLD AUTO: 17 % (ref 19.6–45.3)
MCH RBC QN AUTO: 29.4 PG (ref 26.6–33)
MCHC RBC AUTO-ENTMCNC: 33.5 G/DL (ref 31.5–35.7)
MCV RBC AUTO: 87.8 FL (ref 79–97)
MONOCYTES # BLD AUTO: 0.54 10*3/MM3 (ref 0.1–0.9)
MONOCYTES NFR BLD AUTO: 4.9 % (ref 5–12)
NEUTROPHILS NFR BLD AUTO: 76.9 % (ref 42.7–76)
NEUTROPHILS NFR BLD AUTO: 8.38 10*3/MM3 (ref 1.7–7)
NITRITE UR QL STRIP: NEGATIVE
NRBC BLD AUTO-RTO: 0 /100 WBC (ref 0–0.2)
PH UR STRIP.AUTO: 6.5 [PH] (ref 5–8)
PLATELET # BLD AUTO: 245 10*3/MM3 (ref 140–450)
PMV BLD AUTO: 9.2 FL (ref 6–12)
PROT UR QL STRIP: NEGATIVE
RBC # BLD AUTO: 4.25 10*6/MM3 (ref 3.77–5.28)
RBC # UR STRIP: ABNORMAL /HPF
REF LAB TEST METHOD: ABNORMAL
RH BLD: POSITIVE
SP GR UR STRIP: 1.01 (ref 1–1.03)
SQUAMOUS #/AREA URNS HPF: ABNORMAL /HPF
T PALLIDUM IGG SER QL: NORMAL
T&S EXPIRATION DATE: NORMAL
UROBILINOGEN UR QL STRIP: ABNORMAL
WBC # UR STRIP: ABNORMAL /HPF
WBC NRBC COR # BLD AUTO: 10.91 10*3/MM3 (ref 3.4–10.8)

## 2024-05-10 PROCEDURE — 25810000003 LACTATED RINGERS PER 1000 ML: Performed by: NURSE ANESTHETIST, CERTIFIED REGISTERED

## 2024-05-10 PROCEDURE — 25010000002 ONDANSETRON PER 1 MG: Performed by: NURSE ANESTHETIST, CERTIFIED REGISTERED

## 2024-05-10 PROCEDURE — 86780 TREPONEMA PALLIDUM: CPT | Performed by: OBSTETRICS & GYNECOLOGY

## 2024-05-10 PROCEDURE — 25810000003 LACTATED RINGERS SOLUTION: Performed by: OBSTETRICS & GYNECOLOGY

## 2024-05-10 PROCEDURE — 86901 BLOOD TYPING SEROLOGIC RH(D): CPT | Performed by: OBSTETRICS & GYNECOLOGY

## 2024-05-10 PROCEDURE — 82948 REAGENT STRIP/BLOOD GLUCOSE: CPT

## 2024-05-10 PROCEDURE — 59025 FETAL NON-STRESS TEST: CPT

## 2024-05-10 PROCEDURE — 25010000002 FENTANYL CITRATE (PF) 50 MCG/ML SOLUTION PREFILLED SYRINGE: Performed by: NURSE ANESTHETIST, CERTIFIED REGISTERED

## 2024-05-10 PROCEDURE — G0463 HOSPITAL OUTPT CLINIC VISIT: HCPCS

## 2024-05-10 PROCEDURE — 25010000002 OXYTOCIN PER 10 UNITS: Performed by: NURSE ANESTHETIST, CERTIFIED REGISTERED

## 2024-05-10 PROCEDURE — 87086 URINE CULTURE/COLONY COUNT: CPT | Performed by: OBSTETRICS & GYNECOLOGY

## 2024-05-10 PROCEDURE — 25010000002 KETOROLAC TROMETHAMINE PER 15 MG: Performed by: OBSTETRICS & GYNECOLOGY

## 2024-05-10 PROCEDURE — 51703 INSERT BLADDER CATH COMPLEX: CPT

## 2024-05-10 PROCEDURE — 86850 RBC ANTIBODY SCREEN: CPT | Performed by: OBSTETRICS & GYNECOLOGY

## 2024-05-10 PROCEDURE — 85025 COMPLETE CBC W/AUTO DIFF WBC: CPT | Performed by: OBSTETRICS & GYNECOLOGY

## 2024-05-10 PROCEDURE — 81001 URINALYSIS AUTO W/SCOPE: CPT | Performed by: OBSTETRICS & GYNECOLOGY

## 2024-05-10 PROCEDURE — 25810000003 LACTATED RINGERS PER 1000 ML: Performed by: OBSTETRICS & GYNECOLOGY

## 2024-05-10 PROCEDURE — 86900 BLOOD TYPING SEROLOGIC ABO: CPT | Performed by: OBSTETRICS & GYNECOLOGY

## 2024-05-10 PROCEDURE — 25010000002 BUPIVACAINE PF 0.75 % SOLUTION: Performed by: NURSE ANESTHETIST, CERTIFIED REGISTERED

## 2024-05-10 PROCEDURE — 59515 CESAREAN DELIVERY: CPT | Performed by: OBSTETRICS & GYNECOLOGY

## 2024-05-10 PROCEDURE — 25010000002 MORPHINE PER 10 MG: Performed by: NURSE ANESTHETIST, CERTIFIED REGISTERED

## 2024-05-10 PROCEDURE — 25010000002 CEFAZOLIN PER 500 MG: Performed by: OBSTETRICS & GYNECOLOGY

## 2024-05-10 RX ORDER — CALCIUM CARBONATE 500 MG/1
1 TABLET, CHEWABLE ORAL EVERY 4 HOURS PRN
Status: DISCONTINUED | OUTPATIENT
Start: 2024-05-10 | End: 2024-05-12 | Stop reason: HOSPADM

## 2024-05-10 RX ORDER — OXYTOCIN/0.9 % SODIUM CHLORIDE 30/500 ML
125 PLASTIC BAG, INJECTION (ML) INTRAVENOUS ONCE AS NEEDED
Status: DISCONTINUED | OUTPATIENT
Start: 2024-05-10 | End: 2024-05-12 | Stop reason: HOSPADM

## 2024-05-10 RX ORDER — SODIUM CHLORIDE, SODIUM LACTATE, POTASSIUM CHLORIDE, CALCIUM CHLORIDE 600; 310; 30; 20 MG/100ML; MG/100ML; MG/100ML; MG/100ML
INJECTION, SOLUTION INTRAVENOUS CONTINUOUS PRN
Status: DISCONTINUED | OUTPATIENT
Start: 2024-05-10 | End: 2024-05-10 | Stop reason: SURG

## 2024-05-10 RX ORDER — MISOPROSTOL 200 UG/1
800 TABLET ORAL AS NEEDED
Status: DISCONTINUED | OUTPATIENT
Start: 2024-05-10 | End: 2024-05-10 | Stop reason: HOSPADM

## 2024-05-10 RX ORDER — ACETAMINOPHEN 500 MG
1000 TABLET ORAL ONCE
Status: COMPLETED | OUTPATIENT
Start: 2024-05-10 | End: 2024-05-10

## 2024-05-10 RX ORDER — ONDANSETRON 2 MG/ML
4 INJECTION INTRAMUSCULAR; INTRAVENOUS EVERY 6 HOURS PRN
Status: DISCONTINUED | OUTPATIENT
Start: 2024-05-10 | End: 2024-05-12 | Stop reason: HOSPADM

## 2024-05-10 RX ORDER — ONDANSETRON 2 MG/ML
4 INJECTION INTRAMUSCULAR; INTRAVENOUS ONCE AS NEEDED
Status: ACTIVE | OUTPATIENT
Start: 2024-05-10 | End: 2024-05-11

## 2024-05-10 RX ORDER — ACETAMINOPHEN 325 MG/1
650 TABLET ORAL EVERY 6 HOURS
Status: DISCONTINUED | OUTPATIENT
Start: 2024-05-11 | End: 2024-05-12 | Stop reason: HOSPADM

## 2024-05-10 RX ORDER — IBUPROFEN 800 MG/1
800 TABLET ORAL EVERY 6 HOURS SCHEDULED
Status: DISCONTINUED | OUTPATIENT
Start: 2024-05-10 | End: 2024-05-12 | Stop reason: HOSPADM

## 2024-05-10 RX ORDER — OXYTOCIN/0.9 % SODIUM CHLORIDE 30/500 ML
333 PLASTIC BAG, INJECTION (ML) INTRAVENOUS ONCE
Status: DISCONTINUED | OUTPATIENT
Start: 2024-05-10 | End: 2024-05-10 | Stop reason: HOSPADM

## 2024-05-10 RX ORDER — ENOXAPARIN SODIUM 100 MG/ML
40 INJECTION SUBCUTANEOUS EVERY 12 HOURS
Status: DISCONTINUED | OUTPATIENT
Start: 2024-05-11 | End: 2024-05-12 | Stop reason: HOSPADM

## 2024-05-10 RX ORDER — KETOROLAC TROMETHAMINE 30 MG/ML
30 INJECTION, SOLUTION INTRAMUSCULAR; INTRAVENOUS ONCE
Status: COMPLETED | OUTPATIENT
Start: 2024-05-10 | End: 2024-05-10

## 2024-05-10 RX ORDER — OXYCODONE HYDROCHLORIDE 5 MG/1
10 TABLET ORAL EVERY 4 HOURS PRN
Status: DISCONTINUED | OUTPATIENT
Start: 2024-05-10 | End: 2024-05-12 | Stop reason: HOSPADM

## 2024-05-10 RX ORDER — BUPIVACAINE HCL/0.9 % NACL/PF 0.125 %
PLASTIC BAG, INJECTION (ML) EPIDURAL AS NEEDED
Status: DISCONTINUED | OUTPATIENT
Start: 2024-05-10 | End: 2024-05-10 | Stop reason: SURG

## 2024-05-10 RX ORDER — FAMOTIDINE 10 MG/ML
20 INJECTION, SOLUTION INTRAVENOUS ONCE
Status: COMPLETED | OUTPATIENT
Start: 2024-05-10 | End: 2024-05-10

## 2024-05-10 RX ORDER — MORPHINE SULFATE 2 MG/ML
2 INJECTION, SOLUTION INTRAMUSCULAR; INTRAVENOUS EVERY 4 HOURS PRN
Status: DISCONTINUED | OUTPATIENT
Start: 2024-05-10 | End: 2024-05-12 | Stop reason: HOSPADM

## 2024-05-10 RX ORDER — SODIUM CHLORIDE 9 MG/ML
40 INJECTION, SOLUTION INTRAVENOUS AS NEEDED
Status: DISCONTINUED | OUTPATIENT
Start: 2024-05-10 | End: 2024-05-10 | Stop reason: HOSPADM

## 2024-05-10 RX ORDER — ACETAMINOPHEN 500 MG
1000 TABLET ORAL EVERY 6 HOURS
Status: DISPENSED | OUTPATIENT
Start: 2024-05-10 | End: 2024-05-11

## 2024-05-10 RX ORDER — ONDANSETRON 2 MG/ML
INJECTION INTRAMUSCULAR; INTRAVENOUS AS NEEDED
Status: DISCONTINUED | OUTPATIENT
Start: 2024-05-10 | End: 2024-05-10 | Stop reason: SURG

## 2024-05-10 RX ORDER — SODIUM CHLORIDE, SODIUM LACTATE, POTASSIUM CHLORIDE, CALCIUM CHLORIDE 600; 310; 30; 20 MG/100ML; MG/100ML; MG/100ML; MG/100ML
125 INJECTION, SOLUTION INTRAVENOUS CONTINUOUS
Status: DISCONTINUED | OUTPATIENT
Start: 2024-05-10 | End: 2024-05-10

## 2024-05-10 RX ORDER — MORPHINE SULFATE 1 MG/ML
INJECTION, SOLUTION EPIDURAL; INTRATHECAL; INTRAVENOUS AS NEEDED
Status: DISCONTINUED | OUTPATIENT
Start: 2024-05-10 | End: 2024-05-10 | Stop reason: SURG

## 2024-05-10 RX ORDER — CARBOPROST TROMETHAMINE 250 UG/ML
250 INJECTION, SOLUTION INTRAMUSCULAR AS NEEDED
Status: DISCONTINUED | OUTPATIENT
Start: 2024-05-10 | End: 2024-05-10 | Stop reason: HOSPADM

## 2024-05-10 RX ORDER — SIMETHICONE 80 MG
80 TABLET,CHEWABLE ORAL 4 TIMES DAILY PRN
Status: DISCONTINUED | OUTPATIENT
Start: 2024-05-10 | End: 2024-05-12 | Stop reason: HOSPADM

## 2024-05-10 RX ORDER — METHYLERGONOVINE MALEATE 0.2 MG/ML
200 INJECTION INTRAVENOUS ONCE AS NEEDED
Status: DISCONTINUED | OUTPATIENT
Start: 2024-05-10 | End: 2024-05-10 | Stop reason: HOSPADM

## 2024-05-10 RX ORDER — OXYTOCIN/0.9 % SODIUM CHLORIDE 30/500 ML
42 PLASTIC BAG, INJECTION (ML) INTRAVENOUS AS NEEDED
Status: DISCONTINUED | OUTPATIENT
Start: 2024-05-10 | End: 2024-05-10 | Stop reason: HOSPADM

## 2024-05-10 RX ORDER — DIPHENHYDRAMINE HCL 25 MG
25 CAPSULE ORAL EVERY 4 HOURS PRN
Status: DISCONTINUED | OUTPATIENT
Start: 2024-05-10 | End: 2024-05-12 | Stop reason: HOSPADM

## 2024-05-10 RX ORDER — ALUMINA, MAGNESIA, AND SIMETHICONE 2400; 2400; 240 MG/30ML; MG/30ML; MG/30ML
15 SUSPENSION ORAL EVERY 4 HOURS PRN
Status: DISCONTINUED | OUTPATIENT
Start: 2024-05-10 | End: 2024-05-12 | Stop reason: HOSPADM

## 2024-05-10 RX ORDER — MAGNESIUM HYDROXIDE 1200 MG/15ML
LIQUID ORAL AS NEEDED
Status: DISCONTINUED | OUTPATIENT
Start: 2024-05-10 | End: 2024-05-10 | Stop reason: HOSPADM

## 2024-05-10 RX ORDER — CITRIC ACID/SODIUM CITRATE 334-500MG
30 SOLUTION, ORAL ORAL ONCE
Status: COMPLETED | OUTPATIENT
Start: 2024-05-10 | End: 2024-05-10

## 2024-05-10 RX ORDER — NALOXONE HCL 0.4 MG/ML
0.4 VIAL (ML) INJECTION
Status: DISCONTINUED | OUTPATIENT
Start: 2024-05-10 | End: 2024-05-12 | Stop reason: HOSPADM

## 2024-05-10 RX ORDER — SODIUM CHLORIDE 0.9 % (FLUSH) 0.9 %
10 SYRINGE (ML) INJECTION EVERY 12 HOURS SCHEDULED
Status: DISCONTINUED | OUTPATIENT
Start: 2024-05-10 | End: 2024-05-10 | Stop reason: HOSPADM

## 2024-05-10 RX ORDER — OXYCODONE HYDROCHLORIDE 5 MG/1
5 TABLET ORAL EVERY 4 HOURS PRN
Status: DISCONTINUED | OUTPATIENT
Start: 2024-05-10 | End: 2024-05-12 | Stop reason: HOSPADM

## 2024-05-10 RX ORDER — FENTANYL CITRATE 50 UG/ML
INJECTION, SOLUTION INTRAMUSCULAR; INTRAVENOUS AS NEEDED
Status: DISCONTINUED | OUTPATIENT
Start: 2024-05-10 | End: 2024-05-10 | Stop reason: SURG

## 2024-05-10 RX ORDER — NALBUPHINE HYDROCHLORIDE 10 MG/ML
2.5 INJECTION, SOLUTION INTRAMUSCULAR; INTRAVENOUS; SUBCUTANEOUS EVERY 4 HOURS PRN
Status: ACTIVE | OUTPATIENT
Start: 2024-05-10 | End: 2024-05-11

## 2024-05-10 RX ORDER — SODIUM CHLORIDE 0.9 % (FLUSH) 0.9 %
10 SYRINGE (ML) INJECTION AS NEEDED
Status: DISCONTINUED | OUTPATIENT
Start: 2024-05-10 | End: 2024-05-10 | Stop reason: HOSPADM

## 2024-05-10 RX ORDER — DOCUSATE SODIUM 100 MG/1
100 CAPSULE, LIQUID FILLED ORAL 2 TIMES DAILY PRN
Status: DISCONTINUED | OUTPATIENT
Start: 2024-05-10 | End: 2024-05-12 | Stop reason: HOSPADM

## 2024-05-10 RX ORDER — EPHEDRINE SULFATE 5 MG/ML
INJECTION INTRAVENOUS AS NEEDED
Status: DISCONTINUED | OUTPATIENT
Start: 2024-05-10 | End: 2024-05-10 | Stop reason: SURG

## 2024-05-10 RX ORDER — BUPIVACAINE HYDROCHLORIDE 7.5 MG/ML
INJECTION, SOLUTION EPIDURAL; RETROBULBAR AS NEEDED
Status: DISCONTINUED | OUTPATIENT
Start: 2024-05-10 | End: 2024-05-10 | Stop reason: SURG

## 2024-05-10 RX ORDER — PRENATAL VIT/IRON FUM/FOLIC AC 27MG-0.8MG
1 TABLET ORAL DAILY
Status: DISCONTINUED | OUTPATIENT
Start: 2024-05-10 | End: 2024-05-12 | Stop reason: HOSPADM

## 2024-05-10 RX ORDER — LIDOCAINE HYDROCHLORIDE 10 MG/ML
0.5 INJECTION, SOLUTION INFILTRATION; PERINEURAL ONCE AS NEEDED
Status: DISCONTINUED | OUTPATIENT
Start: 2024-05-10 | End: 2024-05-10 | Stop reason: HOSPADM

## 2024-05-10 RX ADMIN — ONDANSETRON 4 MG: 2 INJECTION INTRAMUSCULAR; INTRAVENOUS at 07:29

## 2024-05-10 RX ADMIN — Medication 100 MCG: at 07:47

## 2024-05-10 RX ADMIN — BUPIVACAINE HYDROCHLORIDE 1.8 ML: 7.5 INJECTION, SOLUTION EPIDURAL; RETROBULBAR at 07:40

## 2024-05-10 RX ADMIN — ACETAMINOPHEN 1000 MG: 500 TABLET, FILM COATED ORAL at 20:59

## 2024-05-10 RX ADMIN — OXYTOCIN 20 UNITS: 10 INJECTION INTRAVENOUS at 07:59

## 2024-05-10 RX ADMIN — FENTANYL CITRATE 15 MCG: 50 INJECTION, SOLUTION INTRAMUSCULAR; INTRAVENOUS at 07:40

## 2024-05-10 RX ADMIN — EPHEDRINE SULFATE 5 MG: 5 INJECTION INTRAVENOUS at 07:44

## 2024-05-10 RX ADMIN — KETOROLAC TROMETHAMINE 30 MG: 30 INJECTION, SOLUTION INTRAMUSCULAR; INTRAVENOUS at 09:49

## 2024-05-10 RX ADMIN — FAMOTIDINE 20 MG: 10 INJECTION INTRAVENOUS at 06:28

## 2024-05-10 RX ADMIN — Medication 100 MCG: at 07:52

## 2024-05-10 RX ADMIN — SODIUM CHLORIDE 2 G: 9 INJECTION, SOLUTION INTRAVENOUS at 07:28

## 2024-05-10 RX ADMIN — IBUPROFEN 800 MG: 800 TABLET, FILM COATED ORAL at 23:35

## 2024-05-10 RX ADMIN — Medication 100 MCG: at 07:43

## 2024-05-10 RX ADMIN — EPHEDRINE SULFATE 5 MG: 5 INJECTION INTRAVENOUS at 07:52

## 2024-05-10 RX ADMIN — SODIUM CHLORIDE, POTASSIUM CHLORIDE, SODIUM LACTATE AND CALCIUM CHLORIDE: 600; 310; 30; 20 INJECTION, SOLUTION INTRAVENOUS at 07:28

## 2024-05-10 RX ADMIN — SODIUM CHLORIDE, POTASSIUM CHLORIDE, SODIUM LACTATE AND CALCIUM CHLORIDE 2000 ML: 600; 310; 30; 20 INJECTION, SOLUTION INTRAVENOUS at 06:52

## 2024-05-10 RX ADMIN — ACETAMINOPHEN 1000 MG: 500 TABLET ORAL at 06:28

## 2024-05-10 RX ADMIN — SODIUM CITRATE AND CITRIC ACID MONOHYDRATE 30 ML: 334; 500 SOLUTION ORAL at 06:28

## 2024-05-10 RX ADMIN — IBUPROFEN 800 MG: 800 TABLET, FILM COATED ORAL at 17:27

## 2024-05-10 RX ADMIN — SODIUM CHLORIDE, POTASSIUM CHLORIDE, SODIUM LACTATE AND CALCIUM CHLORIDE 125 ML/HR: 600; 310; 30; 20 INJECTION, SOLUTION INTRAVENOUS at 06:33

## 2024-05-10 RX ADMIN — MORPHINE SULFATE 0.2 MG: 1 INJECTION, SOLUTION EPIDURAL; INTRATHECAL; INTRAVENOUS at 07:40

## 2024-05-10 NOTE — PLAN OF CARE
Problem: Adult Inpatient Plan of Care  Goal: Plan of Care Review  Outcome: Ongoing, Progressing  Flowsheets (Taken 5/10/2024 0939)  Outcome Evaluation: VSS, bleeding WDL, fundal checks WDL, No pain at this time, breastfeeding infant with good latch, education given about breastfeeding, no N/V.  Goal: Patient-Specific Goal (Individualized)  Outcome: Ongoing, Progressing  Goal: Absence of Hospital-Acquired Illness or Injury  Outcome: Ongoing, Progressing  Intervention: Identify and Manage Fall Risk  Recent Flowsheet Documentation  Taken 5/10/2024 0930 by Elli Varghese, RN  Safety Promotion/Fall Prevention:   safety round/check completed   nonskid shoes/slippers when out of bed   fall prevention program maintained   clutter free environment maintained   assistive device/personal items within reach   activity supervised   room organization consistent  Taken 5/10/2024 0915 by Elli Varghese, RN  Safety Promotion/Fall Prevention:   safety round/check completed   room organization consistent   nonskid shoes/slippers when out of bed   fall prevention program maintained   clutter free environment maintained   assistive device/personal items within reach   activity supervised  Taken 5/10/2024 0900 by Elli Varghese, RN  Safety Promotion/Fall Prevention:   safety round/check completed   room organization consistent   nonskid shoes/slippers when out of bed   fall prevention program maintained   clutter free environment maintained   assistive device/personal items within reach   activity supervised  Taken 5/10/2024 0845 by Elli Varghese, RN  Safety Promotion/Fall Prevention:   safety round/check completed   room organization consistent   nonskid shoes/slippers when out of bed   fall prevention program maintained   clutter free environment maintained   assistive device/personal items within reach   activity supervised  Intervention: Prevent and Manage VTE (Venous Thromboembolism) Risk  Recent Flowsheet Documentation  Taken 5/10/2024  0845 by Elli Varghese RN  VTE Prevention/Management:   bilateral   sequential compression devices on  Range of Motion: active ROM (range of motion) encouraged  Goal: Optimal Comfort and Wellbeing  Outcome: Ongoing, Progressing  Intervention: Monitor Pain and Promote Comfort  Recent Flowsheet Documentation  Taken 5/10/2024 0930 by Elli Varghese RN  Pain Management Interventions: no interventions per patient request  Taken 5/10/2024 0915 by Elli Varghese RN  Pain Management Interventions: no interventions per patient request  Goal: Readiness for Transition of Care  Outcome: Ongoing, Progressing     Problem: Adult Inpatient Plan of Care  Goal: Absence of Hospital-Acquired Illness or Injury  Intervention: Prevent and Manage VTE (Venous Thromboembolism) Risk  Recent Flowsheet Documentation  Taken 5/10/2024 0845 by Elli Varghese RN  VTE Prevention/Management:   bilateral   sequential compression devices on  Range of Motion: active ROM (range of motion) encouraged     Problem: Bleeding ( Delivery)  Goal: Bleeding is Controlled  Outcome: Met     Problem: Change in Fetal Wellbeing ( Delivery)  Goal: Stable Fetal Wellbeing  Outcome: Met     Problem: Infection ( Delivery)  Goal: Absence of Infection Signs and Symptoms  Outcome: Met     Problem: Respiratory Compromise ( Delivery)  Goal: Effective Oxygenation and Ventilation  Outcome: Met     Problem: Breastfeeding  Goal: Effective Breastfeeding  Outcome: Ongoing, Progressing  Intervention: Support Exclusive Breastfeeding Success  Recent Flowsheet Documentation  Taken 5/10/2024 09 by Elli Varghese RN  Breastfeeding Support:   encouragement provided   maternal rest encouraged   maternal nutrition promoted   maternal hydration promoted   infant-mother separation minimized  Intervention: Promote Effective Breastfeeding  Recent Flowsheet Documentation  Taken 5/10/2024 09 by Elli Varghese, RN  Breastfeeding Assistance:   assisted with positioning    feeding cue recognition promoted   feeding on demand promoted   feeding session observed   infant latch-on verified   infant suck/swallow verified   support offered   Goal Outcome Evaluation:              Outcome Evaluation: VSS, bleeding WDL, fundal checks WDL, No pain at this time, breastfeeding infant with good latch, education given about breastfeeding, no N/V.

## 2024-05-10 NOTE — PAYOR COMM NOTE
"TO:[facility]  FROM:JONNIE CLARKE, RN PHONE [phone]  INPT CLINICALS REF# [id number]    Virgil Keen (34 y.o. Female)       Date of Birth   1990    Social Security Number       Address   [address] [facility] [address]    Home Phone   [phone]    MRN   [id number]       [facility]   None    Marital Status   Single                            Admission Date   5/10/24    Admission Type   Elective    Admitting Provider   Brenden Swan MD    Attending Provider   Brenden Swan MD    Department, Room/Bed   [facility] OB GYN, W204/       Discharge Date       Discharge Disposition       Discharge Destination                                 Attending Provider: Brenden Swan MD    Allergies: Fluoxetine, Sulfa Antibiotics, Trazodone    Isolation: None   Infection: None   Code Status: CPR    Ht: 157.5 cm (62\")   Wt: 107 kg (236 lb)    Admission Cmt: None   Principal Problem: None                  Active Insurance as of 5/10/2024       Primary Coverage       Payor Plan Insurance Group Employer/Plan Group    [facility] COMMUNITY PLAN [facility] COMMUNITY PLAN [facility]       Payor Plan Address Payor Plan Phone Number Payor Plan Fax Number Effective Dates    [address]   2022 - None Entered    [facility] [phone]         Subscriber Name Subscriber Birth Date Member ID       VIRGIL KEEN 1990 [id number]                     Emergency Contacts        (Rel.) Home Phone Work Phone Mobile Phone    KEIKO,PATI (Significant Other) -- -- [phone]    Bess Keen (Mother) -- -- [phone]                 History & Physical        Brenden Swan MD at 05/10/24 0704          [facility]  Virgil Keen  :   1990  MRN:   [id number]  CSN:    [id number]     History and Physical        Subjective  Virgil Keen is a 34 y.o. year old  who present for surgery due to intrauterine pregnancy at " 39 weeks, prior  section  Medical History        Past Medical History:   Diagnosis Date    Anal fissure      Carpal tunnel syndrome 2016    Depression 2016    Gastroesophageal reflux disease 2016    Generalized anxiety disorder 2016    Irregular uterine bleeding       Metorrhagia.Pt states she had been spotting for approx 2 weeks, but this has stopped. PT usually has cycle every 3 months.     Irritable bowel syndrome 2016    Migraine 2016    Ovarian cyst      PMS (premenstrual syndrome)      Urinary tract infection      Varicella           Surgical History         Past Surgical History:   Procedure Laterality Date     SECTION N/A 10/24/2022     Procedure:  SECTION PRIMARY;  Surgeon: Brenden Swan MD;  Location: [facility];  Service: Obstetrics/Gynecology;  Laterality: N/A;           Tobacco Use History   Social History    Tobacco Use      Smoking status: Former        Packs/day: 0.25        Types: Cigarettes      Smokeless tobacco: Never      Tobacco comments: 2016: Former Smoker        Current Medications   No current facility-administered medications for this encounter.     Current Outpatient Medications:     ferrous gluconate (FERGON) 324 MG tablet, Take 1 tablet by mouth Daily With Breakfast., Disp: , Rfl:     ferrous gluconate (FERGON) 324 MG tablet, Take 1 tablet by mouth Daily With Breakfast., Disp: 30 tablet, Rfl: 3    glucose blood test strip, Fasting, 1 hour after breakfast/lunch/dinner.  Total for blood sugar checks a day., Disp: 120 each, Rfl: 12    glucose monitor monitoring kit, Fasting, 1 hour after breakfast/lunch/dinner.  Total for blood sugar checks a day., Disp: 1 each, Rfl: 0    Lancets (freestyle) lancets, Fasting, 1 hour after breakfast/lunch/dinner.  Total for blood sugar checks a day., Disp: 120 each, Rfl: 12    ondansetron ODT (ZOFRAN-ODT) 8 MG disintegrating tablet, Take 1 tablet by mouth Every 8 (Eight) Hours As  "Needed for Nausea or Vomiting., Disp: 12 tablet, Rfl: 0    pantoprazole (PROTONIX) 40 MG EC tablet, Take 1 tablet by mouth Daily., Disp: 30 tablet, Rfl: 6    prenatal vitamin (prenatal, CLASSIC, vitamin) tablet, Take  by mouth Daily., Disp: , Rfl:         Allergies         Allergies   Allergen Reactions    Fluoxetine Other (See Comments)       STATES \"THEY MAKE ME WANT TO KILL MYSELF\" WORSE DEPRESSION    Sulfa Antibiotics Hives       Sulfa Drugs    Trazodone Other (See Comments)       TraZODone HCl TABS  LOSS OF SHORT TERM MEMORY             Review of Systems   Constitutional: Negative.    HENT: Negative.     Respiratory: Negative.     Cardiovascular: Negative.                Objective  LMP 08/09/2023   General: well developed; well nourished  no acute distress   Heart: regular rate and rhythm, S1, S2 normal, no murmur, click, rub or gallop   Lungs: breathing is unlabored  clear to auscultation bilaterally   Abdomen: soft, non-tender; no masses  no umbilical or inguinal hernias are present  no hepato-splenomegaly   Pelvis:: External genitalia:  normal appearance of the external genitalia including Bartholin's and Wilkerson's glands.   Labs        Lab Results   Component Value Date      03/11/2024     HGB 11.7 (L) 03/11/2024     HCT 35.2 03/11/2024     WBC 12.95 (H) 03/11/2024      01/26/2020     K 3.5 01/26/2020      01/26/2020     CO2 27.0 01/26/2020     BUN 9 01/26/2020     CREATININE 0.78 01/26/2020     GLUCOSE 102 (H) 01/26/2020     ALBUMIN 4.80 01/26/2020     CALCIUM 9.6 01/26/2020     AST 22 01/26/2020     ALT 17 01/26/2020     BILITOT 0.5 01/26/2020               Assessment  IUP @ 39 weeks  Prior C/S           Plan  R C/S              2. Risks, complications, benefits, and other alternatives discussed.    Electronically signed by Brenden Swan MD at 05/10/24 0704       Emergency Department Notes    No notes of this type exist for this encounter.       Vital Signs (last day)       " Date/Time Temp Temp src Pulse Resp BP Patient Position SpO2    05/10/24 1125 -- -- 86 16 117/72 Sitting 99    05/10/24 1055 98.5 (36.9) Oral 86 16 119/79 Sitting 99    05/10/24 1015 -- -- 91 -- 112/96 -- 96    05/10/24 1000 -- -- 98 -- 97/59 -- 98    05/10/24 0945 -- -- 94 -- 102/64 -- 99    05/10/24 0930 -- -- 85 -- 115/76 -- 99    05/10/24 0915 -- -- 94 -- 93/48 -- 99    05/10/24 0905 -- -- 89 -- 97/55 -- 99    05/10/24 0902 -- -- 98 -- 109/54 -- --    05/10/24 0900 -- -- 82 -- -- -- 100    05/10/24 0855 -- -- 87 -- 88/72 -- 100    05/10/24 0850 -- -- 74 -- 102/79 -- --    05/10/24 0845 98 (36.7) Oral 91 17 119/89 Lying 100    05/10/24 0844 -- -- 96 -- 115/56 -- --    05/10/24 0635 -- -- 128 -- 121/93 -- --    05/10/24 0610 -- -- 87 -- -- -- 100    05/10/24 0605 -- -- 100 -- -- -- 99    05/10/24 0601 -- -- 99 -- 107/72 -- --    05/10/24 0555 -- -- 101 -- -- -- 99    05/10/24 0535 -- -- 104 -- -- -- 99    05/10/24 0530 -- -- 94 -- 127/84 -- 98    05/10/24 0525 -- -- 96 -- -- -- 98    05/10/24 0520 -- -- 102 -- -- -- 99    05/10/24 0516 -- -- 99 -- 101/79 -- --    05/10/24 0515 -- -- 98 -- 136/85 -- 98    05/10/24 0513 97.6 (36.4) Oral 96 16 -- Lying 99          Lab Results (last 24 hours)       Procedure Component Value Units Date/Time    Urinalysis, Microscopic Only - Urine, Clean Catch [[id number]]  (Abnormal) Collected: 05/10/24 0603    Specimen: Urine, Clean Catch Updated: 05/10/24 0705     RBC, UA 0-2 /HPF      WBC, UA 6-10 /HPF      Bacteria, UA Trace /HPF      Squamous Epithelial Cells, UA 7-12 /HPF      Hyaline Casts, UA None Seen /LPF      Methodology Manual Light Microscopy    Urine Culture - Urine, Urine, Clean Catch [[id number]] Collected: 05/10/24 0603    Specimen: Urine, Clean Catch Updated: 05/10/24 0705    Urinalysis With Culture If Indicated - Urine, Clean Catch [[id number]]  (Abnormal) Collected: 05/10/24 0603    Specimen: Urine, Clean Catch Updated: 05/10/24 0655     Color, UA Yellow     Appearance,  UA Clear     pH, UA 6.5     Specific Gravity, UA 1.010     Glucose, UA Negative     Ketones, UA Negative     Bilirubin, UA Negative     Blood, UA Negative     Protein, UA Negative     Leuk Esterase, UA Trace     Nitrite, UA Negative     Urobilinogen, UA 0.2 E.U./dL    Narrative:      In absence of clinical symptoms, the presence of pyuria, bacteria, and/or nitrites on the urinalysis result does not correlate with infection.    CBC & Differential [[id number]]  (Abnormal) Collected: 05/10/24 0620    Specimen: Blood Updated: 05/10/24 0648    Narrative:      The following orders were created for panel order CBC & Differential.  Procedure                               Abnormality         Status                     ---------                               -----------         ------                     CBC Auto Differential[[id number]]        Abnormal            Final result                 Please view results for these tests on the individual orders.    CBC Auto Differential [[id number]]  (Abnormal) Collected: 05/10/24 0620    Specimen: Blood Updated: 05/10/24 0648     WBC 10.91 10*3/mm3      RBC 4.25 10*6/mm3      Hemoglobin 12.5 g/dL      Hematocrit 37.3 %      MCV 87.8 fL      MCH 29.4 pg      MCHC 33.5 g/dL      RDW 15.9 %      RDW-SD 49.5 fl      MPV 9.2 fL      Platelets 245 10*3/mm3      Neutrophil % 76.9 %      Lymphocyte % 17.0 %      Monocyte % 4.9 %      Eosinophil % 0.4 %      Basophil % 0.2 %      Immature Grans % 0.6 %      Neutrophils, Absolute 8.38 10*3/mm3      Lymphocytes, Absolute 1.86 10*3/mm3      Monocytes, Absolute 0.54 10*3/mm3      Eosinophils, Absolute 0.04 10*3/mm3      Basophils, Absolute 0.02 10*3/mm3      Immature Grans, Absolute 0.07 10*3/mm3      nRBC 0.0 /100 WBC     POC Glucose Once [[id number]]  (Normal) Collected: 05/10/24 0638    Specimen: Blood Updated: 05/10/24 0640     Glucose 93 mg/dL      Comment: Serial Number: [id number]:  [id number]       T Pallidum Antibody w/ reflex RPR  (Syphilis) [[id number]] Collected: 05/10/24 0619    Specimen: Blood Updated: 05/10/24 0637             Operative/Procedure Notes (last 24 hours)        Brenden Swan MD at 05/10/24 0749           Artis Keen  : 1990  MRN: [id number]  CSN: [id number]    Operative Report    Pre-Operative Dx:   IUP at 39w2d weeks   Macrosomia  Previous  section - not a  candidate      Postoperative dx:    Same as above     Procedure: Repeat  (LTCS)       Surgeon: Brenden Swan MD   Assistant: Staff  was responsible for performing the following activities: Retraction and Placing Dressing and their skilled assistance was necessary for the success of this case.         Anesthesia: Spinal        EBL: <950 mls.       Antibiotics: cefazolin 2 gms     Drains: Rose     Findings: VMI, 3VC, intact placenta, normal adnexa       Indications: Patient is a 34-year-old presenting for repeat at term.           Procedure Details:   After the appropriate time out, the patient was prepped and draped in the usual sterile fashion.  She had been placed in the dorsal supine left lateral tilt position.  A rose catheter had been placed in the bladder for drainage during the procedure. A pfannenstiel skin incision was made with a knife and carried down to the fascia.  The fascia was nicked with a scalpel and the incision was extended bilaterally with curved Nelson scissors. The superior aspect of the fascia was grasped with Kocher clamps x 2 and bluntly as well as sharply dissected away from the underlying rectus muscles.  A similar process was repeated inferiorly. The rectus muscles were  and the peritoneal cavity was entered sharply without complications. The bladder flap was created with Metzenbaum scissors and pickups with teeth.  The  retractor was placed and after checking for no entrapment, was retracted down.  A transverse uterine incision was made with the knife  and extended bilaterally.  The infant was delivered from the vertex presentation.  The mouth and nose were bulb suctioned.  The cord was doubly clamped and cut and the infant handed to the pediatric nurse in attendance.  Cord blood was obtained.  The placenta was manually extracted from the uterus.  The uterus was then elevated from the abdomen and wiped free of remaining membranes.  The uterine incision was closed with #1 chromic in a running locking fashion with a second imbricating stitch.  The uterus had been returned to the abdomen.  The lateral gutters were wiped free of remaining clots.  [place] suture 2-0 chromic x 1 was used in the midline to complete the hemostatic process.  The site of surgical incision was inspected and noted to be hemostatic.  The  retractor was removed.  The subfascial tissue was inspected and noted to be hemostatic.  Peritoneum was closed with 3-0 Vicryl suture in a running fashion.  The fascia was closed with 0 PDS in a running non-locking fashion.  Subcutaneous tissue was inspected and noted to be hemostatic with minimal bovie electrocautery.  Ruben's fascia was closed with 3-0 Vicryl in a running non-locking fashion.  The skin was approximated with 4-0 Vicryl on a Jah needle in a running subcuticular fashion.  Glue and Steris placed.. Dressings were placed.  All instrument and sponge counts were correct at the end of the procedure. The patient tolerated the procedure well.  There were no complications.  She was taken to postoperative recovery room in stable condition.          Complications:   None      Disposition:   Mother to Mother Baby/Postpartum  in stable condition currently.   Baby to NBN  in stable condition currently.     Brenden Swan MD  5/10/2024  08:43 EDT      Electronically signed by Brenden Swan MD at 05/10/24 0840       Physician Progress Notes (last 24 hours)  Notes from 24 1158 through 05/10/24 1158   No notes of this type  exist for this encounter.

## 2024-05-10 NOTE — NON STRESS TEST
Lorena Keen, a  at 39w2d with an SALEEM of 5/15/2024, by Last Menstrual Period, was seen at [facility] for a nonstress test.    Chief Complaint   Patient presents with    Scheduled        Patient Active Problem List   Diagnosis    Carpal tunnel syndrome    Depression    Generalized anxiety disorder    Gastroesophageal reflux disease    Irritable bowel syndrome    Migraine    OCD (obsessive compulsive disorder)    Thoracic myofascial strain    Tobacco abuse    Previous  section    Short interval between pregnancies affecting pregnancy, antepartum    Pregnancy       Start Time: 0530  Stop Time: 0600    Interpretation A  Nonstress Test Interpretation A: Reactive

## 2024-05-10 NOTE — PAYOR COMM NOTE
"To:  [facility]  From: Steph Vila RN  Phone: [phone]  Fax: [phone]  NPI: [id number]  TIN: [id number]  Member ID: [id number]   MRN: [id number]     EDC 5/15@39 2/7 WEEKS  DELIVERED 5/10/24@0758 REPEAT C/S MALE [id number]   APG 9/10    Virgil Keen (34 y.o. Female)       Date of Birth   1990    Social Security Number       Address   [address] [id number]    Home Phone   [phone]    MRN   [id number]       Yazidi   None    Marital Status   Single                            Admission Date   5/10/24    Admission Type   Elective    Admitting Provider   Brenden Swan MD    Attending Provider   Brenden Swan MD    Department, Room/Bed   [facility], W204       Discharge Date       Discharge Disposition       Discharge Destination                                 Attending Provider: Brenden Swan MD    Allergies: Fluoxetine, Sulfa Antibiotics, Trazodone    Isolation: None   Infection: None   Code Status: CPR    Ht: 157.5 cm (62\")   Wt: 107 kg (236 lb)    Admission Cmt: None   Principal Problem: None                  Active Insurance as of 5/10/2024       Primary Coverage       Payor Plan Insurance Group Employer/Plan Group    [facility] COMMUNITY PLAN [facility] COMMUNITY PLAN [facility]       Payor Plan Address Payor Plan Phone Number Payor Plan Fax Number Effective Dates    [address]   2022 - None Entered    [facility] [id number]         Subscriber Name Subscriber Birth Date Member ID       OSMANI,VIRGIL WIGGINS 1990 [id number]                     Emergency Contacts        (Rel.) Home Phone Work Phone Mobile Phone    KEIKOPATI GOEL (Significant Other) -- -- [phone]    OsmaniBess (Mother) -- -- [phone]                 History & Physical        Brenden Swan MD at 05/10/24 0704          [facility]  Virgil WIGGINS Osmani  :   1990  MRN:   [id number]  CSN:    [id number]     History and " Physical        Subjective  Lorena Keen is a 34 y.o. year old  who present for surgery due to intrauterine pregnancy at 39 weeks, prior  section  Medical History        Past Medical History:   Diagnosis Date    Anal fissure      Carpal tunnel syndrome 2016    Depression 2016    Gastroesophageal reflux disease 2016    Generalized anxiety disorder 2016    Irregular uterine bleeding       Metorrhagia.Pt states she had been spotting for approx 2 weeks, but this has stopped. PT usually has cycle every 3 months.     Irritable bowel syndrome 2016    Migraine 2016    Ovarian cyst      PMS (premenstrual syndrome)      Urinary tract infection      Varicella           Surgical History         Past Surgical History:   Procedure Laterality Date     SECTION N/A 10/24/2022     Procedure:  SECTION PRIMARY;  Surgeon: Brenden Swan MD;  Location: [facility];  Service: Obstetrics/Gynecology;  Laterality: N/A;           Tobacco Use History   Social History    Tobacco Use      Smoking status: Former        Packs/day: 0.25        Types: Cigarettes      Smokeless tobacco: Never      Tobacco comments: 2016: Former Smoker        Current Medications   No current facility-administered medications for this encounter.     Current Outpatient Medications:     ferrous gluconate (FERGON) 324 MG tablet, Take 1 tablet by mouth Daily With Breakfast., Disp: , Rfl:     ferrous gluconate (FERGON) 324 MG tablet, Take 1 tablet by mouth Daily With Breakfast., Disp: 30 tablet, Rfl: 3    glucose blood test strip, Fasting, 1 hour after breakfast/lunch/dinner.  Total for blood sugar checks a day., Disp: 120 each, Rfl: 12    glucose monitor monitoring kit, Fasting, 1 hour after breakfast/lunch/dinner.  Total for blood sugar checks a day., Disp: 1 each, Rfl: 0    Lancets (freestyle) lancets, Fasting, 1 hour after breakfast/lunch/dinner.  Total for blood sugar checks a day.,  "Disp: 120 each, Rfl: 12    ondansetron ODT (ZOFRAN-ODT) 8 MG disintegrating tablet, Take 1 tablet by mouth Every 8 (Eight) Hours As Needed for Nausea or Vomiting., Disp: 12 tablet, Rfl: 0    pantoprazole (PROTONIX) 40 MG EC tablet, Take 1 tablet by mouth Daily., Disp: 30 tablet, Rfl: 6    prenatal vitamin (prenatal, CLASSIC, vitamin) tablet, Take  by mouth Daily., Disp: , Rfl:         Allergies         Allergies   Allergen Reactions    Fluoxetine Other (See Comments)       STATES \"THEY MAKE ME WANT TO KILL MYSELF\" WORSE DEPRESSION    Sulfa Antibiotics Hives       Sulfa Drugs    Trazodone Other (See Comments)       TraZODone HCl TABS  LOSS OF SHORT TERM MEMORY             Review of Systems   Constitutional: Negative.    HENT: Negative.     Respiratory: Negative.     Cardiovascular: Negative.                Objective  LMP 08/09/2023   General: well developed; well nourished  no acute distress   Heart: regular rate and rhythm, S1, S2 normal, no murmur, click, rub or gallop   Lungs: breathing is unlabored  clear to auscultation bilaterally   Abdomen: soft, non-tender; no masses  no umbilical or inguinal hernias are present  no hepato-splenomegaly   Pelvis:: External genitalia:  normal appearance of the external genitalia including Bartholin's and Dunlap's glands.   Labs        Lab Results   Component Value Date      03/11/2024     HGB 11.7 (L) 03/11/2024     HCT 35.2 03/11/2024     WBC 12.95 (H) 03/11/2024      01/26/2020     K 3.5 01/26/2020      01/26/2020     CO2 27.0 01/26/2020     BUN 9 01/26/2020     CREATININE 0.78 01/26/2020     GLUCOSE 102 (H) 01/26/2020     ALBUMIN 4.80 01/26/2020     CALCIUM 9.6 01/26/2020     AST 22 01/26/2020     ALT 17 01/26/2020     BILITOT 0.5 01/26/2020               Assessment  IUP @ 39 weeks  Prior C/S           Plan  R C/S              2. Risks, complications, benefits, and other alternatives discussed.    Electronically signed by Brenden Swan MD at " 05/10/24 0704       Vital Signs (last day)       Date/Time Temp Temp src Pulse Resp BP Patient Position SpO2    05/10/24 1225 98.5 (36.9) Oral 108 16 111/63 Sitting 98    05/10/24 1155 -- -- 92 16 110/71 Sitting 96    05/10/24 1125 -- -- 86 16 117/72 Sitting 99    05/10/24 1055 98.5 (36.9) Oral 86 16 119/79 Sitting 99    05/10/24 1015 -- -- 91 -- 112/96 -- 96    05/10/24 1000 -- -- 98 -- 97/59 -- 98    05/10/24 0945 -- -- 94 -- 102/64 -- 99    05/10/24 0930 -- -- 85 -- 115/76 -- 99    05/10/24 0915 -- -- 94 -- 93/48 -- 99    05/10/24 0905 -- -- 89 -- 97/55 -- 99    05/10/24 0902 -- -- 98 -- 109/54 -- --    05/10/24 0900 -- -- 82 -- -- -- 100    05/10/24 0855 -- -- 87 -- 88/72 -- 100    05/10/24 0850 -- -- 74 -- 102/79 -- --    05/10/24 0845 98 (36.7) Oral 91 17 119/89 Lying 100    05/10/24 0844 -- -- 96 -- 115/56 -- --    05/10/24 0635 -- -- 128 -- 121/93 -- --    05/10/24 0610 -- -- 87 -- -- -- 100    05/10/24 0605 -- -- 100 -- -- -- 99    05/10/24 0601 -- -- 99 -- 107/72 -- --    05/10/24 0555 -- -- 101 -- -- -- 99    05/10/24 0535 -- -- 104 -- -- -- 99    05/10/24 0530 -- -- 94 -- 127/84 -- 98    05/10/24 0525 -- -- 96 -- -- -- 98    05/10/24 0520 -- -- 102 -- -- -- 99    05/10/24 0516 -- -- 99 -- 101/79 -- --    05/10/24 0515 -- -- 98 -- 136/85 -- 98    05/10/24 0513 97.6 (36.4) Oral 96 16 -- Lying 99          Current Facility-Administered Medications   Medication Dose Route Frequency Provider Last Rate Last Admin    acetaminophen (TYLENOL) tablet 1,000 mg  1,000 mg Oral Q6H Brenden Swan MD        Followed by    [START ON 5/11/2024] acetaminophen (TYLENOL) tablet 650 mg  650 mg Oral Q6H Brenden Swan MD        aluminum-magnesium hydroxide-simethicone (MAALOX MAX) 400-400-40 MG/5ML suspension 15 mL  15 mL Oral Q4H PRN Brenden Swan MD        Or    calcium carbonate (TUMS) chewable tablet 500 mg (200 mg elemental)  1 tablet Oral Q4H PRN Brenden Swan MD         diphenhydrAMINE (BENADRYL) capsule 25 mg  25 mg Oral Q4H PRN Brenden Swan MD        docusate sodium (COLACE) capsule 100 mg  100 mg Oral BID PRN Brenden Swan MD        [START ON 5/11/2024] Enoxaparin Sodium (LOVENOX) syringe 40 mg  40 mg Subcutaneous Q12H Brenden Swan MD        ibuprofen (ADVIL,MOTRIN) tablet 800 mg  800 mg Oral Q6H Brenden Swan MD        lanolin topical 1 Application  1 Application Topical Q1H PRN Brenden Swan MD        Morphine sulfate (PF) injection 2 mg  2 mg Intravenous Q4H PRN Brenden Swan MD        And    naloxone (NARCAN) injection 0.4 mg  0.4 mg Intravenous Q5 Min PRN Brenden Swan MD        nalbuphine (NUBAIN) injection 2.5 mg  2.5 mg Intravenous Q4H PRN Jazmin Sanchez CRNA        naloxone (NARCAN) 0.4 mg in sodium chloride 0.9 % 1,000 mL infusion  0.4 mg Intravenous Continuous PRN Jazmin Sanchez CRNA        ondansetron (ZOFRAN) injection 4 mg  4 mg Intravenous Once PRN Jazmin Sanchez CRNA        ondansetron (ZOFRAN) injection 4 mg  4 mg Intravenous Q6H PRN Brenden Swan MD        oxyCODONE (ROXICODONE) immediate release tablet 5 mg  5 mg Oral Q4H PRN Brenden Swan MD        Or    oxyCODONE (ROXICODONE) immediate release tablet 10 mg  10 mg Oral Q4H PRN Brenden Swan MD        oxytocin (PITOCIN) 30 units in 0.9% sodium chloride 500 mL (premix)  125 mL/hr Intravenous Once PRN Brenden Swan MD        prenatal vitamin tablet 1 tablet  1 tablet Oral Daily Brenden Swan MD        simethicone (MYLICON) chewable tablet 80 mg  80 mg Oral 4x Daily PRN Brenden Swan MD        Tetanus-Diphth-Acell Pertussis (BOOSTRIX) injection 0.5 mL  0.5 mL Intramuscular During Hospitalization Brenden Swan MD         Lab Results (last 24 hours)       Procedure Component Value Units Date/Time    T Pallidum Antibody w/  reflex RPR (Syphilis) [[id number]]  (Normal) Collected: 05/10/24 0619    Specimen: Blood Updated: 05/10/24 1237     Treponemal AB Total Non-Reactive    Urinalysis, Microscopic Only - Urine, Clean Catch [[id number]]  (Abnormal) Collected: 05/10/24 0603    Specimen: Urine, Clean Catch Updated: 05/10/24 0705     RBC, UA 0-2 /HPF      WBC, UA 6-10 /HPF      Bacteria, UA Trace /HPF      Squamous Epithelial Cells, UA 7-12 /HPF      Hyaline Casts, UA None Seen /LPF      Methodology Manual Light Microscopy    Urine Culture - Urine, Urine, Clean Catch [[id number]] Collected: 05/10/24 0603    Specimen: Urine, Clean Catch Updated: 05/10/24 0705    Urinalysis With Culture If Indicated - Urine, Clean Catch [[id number]]  (Abnormal) Collected: 05/10/24 0603    Specimen: Urine, Clean Catch Updated: 05/10/24 0655     Color, UA Yellow     Appearance, UA Clear     pH, UA 6.5     Specific Gravity, UA 1.010     Glucose, UA Negative     Ketones, UA Negative     Bilirubin, UA Negative     Blood, UA Negative     Protein, UA Negative     Leuk Esterase, UA Trace     Nitrite, UA Negative     Urobilinogen, UA 0.2 E.U./dL    Narrative:      In absence of clinical symptoms, the presence of pyuria, bacteria, and/or nitrites on the urinalysis result does not correlate with infection.    CBC & Differential [[id number]]  (Abnormal) Collected: 05/10/24 0620    Specimen: Blood Updated: 05/10/24 0648    Narrative:      The following orders were created for panel order CBC & Differential.  Procedure                               Abnormality         Status                     ---------                               -----------         ------                     CBC Auto Differential[[id number]]        Abnormal            Final result                 Please view results for these tests on the individual orders.    CBC Auto Differential [[id number]]  (Abnormal) Collected: 05/10/24 0620    Specimen: Blood Updated: 05/10/24 0648     WBC 10.91 10*3/mm3      RBC 4.25  "10*6/mm3      Hemoglobin 12.5 g/dL      Hematocrit 37.3 %      MCV 87.8 fL      MCH 29.4 pg      MCHC 33.5 g/dL      RDW 15.9 %      RDW-SD 49.5 fl      MPV 9.2 fL      Platelets 245 10*3/mm3      Neutrophil % 76.9 %      Lymphocyte % 17.0 %      Monocyte % 4.9 %      Eosinophil % 0.4 %      Basophil % 0.2 %      Immature Grans % 0.6 %      Neutrophils, Absolute 8.38 10*3/mm3      Lymphocytes, Absolute 1.86 10*3/mm3      Monocytes, Absolute 0.54 10*3/mm3      Eosinophils, Absolute 0.04 10*3/mm3      Basophils, Absolute 0.02 10*3/mm3      Immature Grans, Absolute 0.07 10*3/mm3      nRBC 0.0 /100 WBC     POC Glucose Once [[id number]]  (Normal) Collected: 05/10/24 0638    Specimen: Blood Updated: 05/10/24 0640     Glucose 93 mg/dL      Comment: Serial Number: [id number]:  [id number]             Imaging Results (Last 24 Hours)       ** No results found for the last 24 hours. **          Orders (last 24 hrs)        Start     Ordered    05/11/24 1230  acetaminophen (TYLENOL) tablet 650 mg  Every 6 Hours        Placed in \"Followed by\" Linked Group    05/10/24 1137    05/11/24 0900  Enoxaparin Sodium (LOVENOX) syringe 40 mg  Every 12 Hours         05/10/24 1137    05/11/24 0600  Discontinue Indwelling Urinary Catheter in AM  Once        Comments: Marin catheter may be removed at 8 hours post-op if urine output is adequate (>30 ml/hr)    05/10/24 1137    05/11/24 0600  Wound Care  Per Order Details        Comments: Postop Day 1. Remove Dressing & Leave Incision Open to Air.    05/10/24 1137    05/11/24 0600  CBC & Differential  Morning Draw         05/10/24 1137    05/11/24 0000  Remove Abdominal Dressing  Once         05/10/24 1137    05/10/24 1800  Ambulate Patient  2 Times Daily      Comments: After anesthesia wears off.    05/10/24 1137    05/10/24 1230  prenatal vitamin tablet 1 tablet  Daily         05/10/24 1137    05/10/24 1230  ibuprofen (ADVIL,MOTRIN) tablet 800 mg  Every 6 Hours Scheduled         05/10/24 1137 " "   05/10/24 1200  Incentive spirometry RT  Every Hour       05/10/24 1137    05/10/24 1138  Code Status and Medical Interventions:  Continuous         05/10/24 1137    05/10/24 1138  Vital Signs Per Hospital Policy  Per Hospital Policy         05/10/24 1137    05/10/24 1138  Notify Provider  Continuous        Comments: Open Order Report to View Parameters Requiring Provider Notification    05/10/24 1137    05/10/24 1138  Patient May Shower  Per Order Details        Comments: After Anesthesia Wears Off & With Assistance    05/10/24 1137    05/10/24 1138  Diet: Regular/House; Fluid Consistency: Thin (IDDSI 0)  Diet Effective Now         05/10/24 1137    05/10/24 1138  Advance Diet As Tolerated -Regular  Until Discontinued         05/10/24 1137    05/10/24 1138  I/O  Every Shift       05/10/24 1137    05/10/24 1138  Fundal and Lochia Check  Per Order Details        Comments: Every 30 Minutes x2, Every 1 Hour x4, Every 4 Hours x24 Hours, Then Every Shift.    05/10/24 1137    05/10/24 1138  Weigh Patient  Once         05/10/24 1137    05/10/24 1138  Continue Indwelling Urinary Catheter Already in Place  Once         05/10/24 1137    05/10/24 1138  Notify Provider if Bladder Distention Continues  Continuous        Comments: Post Catheter Removal    05/10/24 1137    05/10/24 1138  Urinary Catheter Care  Every Shift       05/10/24 1137    05/10/24 1138  Turn Cough Deep Breathe  Once         05/10/24 1137    05/10/24 1138  Encourage early intake of PO fluids  Continuous         05/10/24 1137    05/10/24 1138  Ambulate Patient 3-5 times per day (with or without Marin)  Every Shift       05/10/24 1137    05/10/24 1138  Apply Abdominal Binding Until Discontinued  Until Discontinued         05/10/24 1137    05/10/24 1138  \"If patient tolerates food and liquids after completion of second bag of Pitocin, saline lock IV and discontinue IV fluid infusions.  Once         05/10/24 1137    05/10/24 1138  Breast pump to bed  Once      " "   05/10/24 1137    05/10/24 1138  If indicated -- Please administer RH Immunoglobulin based on results of cord blood evaluation and fetal screen lab tests, pharmacy to dispense  Per Order Details        Comments: See Process Instructions For Reference Range Details.    05/10/24 1137    05/10/24 1138  Maintain Sequential Compression Device  Continuous         05/10/24 1137    05/10/24 1137  oxytocin (PITOCIN) 30 units in 0.9% sodium chloride 500 mL (premix)  Once As Needed         05/10/24 1137    05/10/24 1137  acetaminophen (TYLENOL) tablet 1,000 mg  Every 6 Hours        Placed in \"Followed by\" Linked Group    05/10/24 1137    05/10/24 1137  oxyCODONE (ROXICODONE) immediate release tablet 5 mg  Every 4 Hours PRN        Placed in \"Or\" Linked Group    05/10/24 1137    05/10/24 1137  oxyCODONE (ROXICODONE) immediate release tablet 10 mg  Every 4 Hours PRN        Placed in \"Or\" Linked Group    05/10/24 1137    05/10/24 1137  Morphine sulfate (PF) injection 2 mg  Every 4 Hours PRN        Placed in \"And\" Linked Group    05/10/24 1137    05/10/24 1137  naloxone (NARCAN) injection 0.4 mg  Every 5 Minutes PRN        Placed in \"And\" Linked Group    05/10/24 1137    05/10/24 1137  diphenhydrAMINE (BENADRYL) capsule 25 mg  Every 4 Hours PRN         05/10/24 1137    05/10/24 1137  docusate sodium (COLACE) capsule 100 mg  2 Times Daily PRN         05/10/24 1137    05/10/24 1137  simethicone (MYLICON) chewable tablet 80 mg  4 Times Daily PRN         05/10/24 1137    05/10/24 1137  ondansetron (ZOFRAN) injection 4 mg  Every 6 Hours PRN         05/10/24 1137    05/10/24 1137  lanolin topical 1 Application  Every 1 Hour PRN         05/10/24 1137    05/10/24 1137  aluminum-magnesium hydroxide-simethicone (MAALOX MAX) 400-400-40 MG/5ML suspension 15 mL  Every 4 Hours PRN        Placed in \"Or\" Linked Group    05/10/24 1137    05/10/24 1137  calcium carbonate (TUMS) chewable tablet 500 mg (200 mg elemental)  Every 4 Hours PRN      " "  Placed in \"Or\" Linked Group    05/10/24 1137    05/10/24 1000  oxytocin (PITOCIN) 30 units in 0.9% sodium chloride 500 mL (premix)  Once,   Status:  Discontinued        Placed in \"Followed by\" Linked Group    05/10/24 0913    05/10/24 1000  oxytocin (PITOCIN) 30 units in 0.9% sodium chloride 500 mL (premix)  As Needed,   Status:  Discontinued        Placed in \"Followed by\" Linked Group    05/10/24 0913    05/10/24 1000  ketorolac (TORADOL) injection 30 mg  Once         05/10/24 0913    05/10/24 0914  Continuous Pulse Oximetry  Continuous         05/10/24 0913    05/10/24 0914  Respirations  Continuous        Comments: If respiratory rate is less than 10/min, notify the Anesthesiologist    05/10/24 0913    05/10/24 0914  If respirations are less than 8/min, see Narcan order below. Notify Anesthesiologist STAT.  Continuous         05/10/24 0913    05/10/24 0914  Blood Pressure and Pulse every 4 hours  Continuous         05/10/24 0913    05/10/24 0914  Activity and removal of rose catheter, per Attending physician, on routine post-operative orders  Continuous         05/10/24 0913    05/10/24 0914  Notify Anesthesiologist for any questions/problems  Continuous         05/10/24 0913    05/10/24 0914  Notify Physician (specified)  Until Discontinued         05/10/24 0913    05/10/24 0914  Vital Signs Per Hospital Policy  Per Hospital Policy         05/10/24 0913    05/10/24 0914  Strict Bed Rest  Until Discontinued         05/10/24 0913    05/10/24 0914  Fundal & Lochia Check  Per Order Details        Comments: Every 15 Minutes x4, Then Every 30 Minutes x2, Then Every Shift    05/10/24 0913    05/10/24 0914  Indwelling Urinary Catheter  Once,   Status:  Canceled         05/10/24 0913    05/10/24 0914  Diet: Regular/House; Fluid Consistency: Thin (IDDSI 0)  Diet Effective Now,   Status:  Canceled         05/10/24 0913    05/10/24 0913  Tetanus-Diphth-Acell Pertussis (BOOSTRIX) injection 0.5 mL  During Hospitalization   "       05/10/24 0914    05/10/24 0913  Strict Intake and Output  Every Shift       05/10/24 0913    05/10/24 0913  Fundal & Lochia Check  Every Shift       05/10/24 0913    05/10/24 0913  methylergonovine (METHERGINE) injection 200 mcg  Once As Needed,   Status:  Discontinued         05/10/24 0913    05/10/24 0913  carboprost (HEMABATE) injection 250 mcg  As Needed,   Status:  Discontinued         05/10/24 0913    05/10/24 0913  miSOPROStol (CYTOTEC) tablet 800 mcg  As Needed,   Status:  Discontinued         05/10/24 0913    05/10/24 0913  ondansetron (ZOFRAN) injection 4 mg  Once As Needed         05/10/24 0913    05/10/24 0913  naloxone (NARCAN) 0.4 mg in sodium chloride 0.9 % 1,000 mL infusion  Continuous PRN         05/10/24 0913    05/10/24 0913  nalbuphine (NUBAIN) injection 2.5 mg  Every 4 Hours PRN         05/10/24 0913    05/10/24 0900  sodium chloride 0.9 % flush 10 mL  Every 12 Hours Scheduled,   Status:  Discontinued         05/10/24 0532    05/10/24 0847  Place Sequential Compression Device  Once         05/10/24 0849    05/10/24 0800  Vital Signs q 4 while awake  Every 4 Hours,   Status:  Canceled      Comments: While the patient is awake.    05/10/24 0532    05/10/24 0755  sodium chloride (NS) irrigation solution  As Needed,   Status:  Discontinued         05/10/24 0755    05/10/24 0706  Urine Culture - Urine, Urine, Clean Catch  Once         05/10/24 0705    05/10/24 0656  Urinalysis, Microscopic Only - Urine, Clean Catch  Once         05/10/24 0655    05/10/24 0641  POC Glucose Once  PROCEDURE ONCE        Comments: Complete no more than 45 minutes prior to patient eating      05/10/24 0638    05/10/24 0630  lactated ringers bolus 2,000 mL  Once         05/10/24 0532    05/10/24 0630  lactated ringers infusion  Continuous,   Status:  Discontinued         05/10/24 0532    05/10/24 0630  famotidine (PEPCID) injection 20 mg  Once         05/10/24 0532    05/10/24 0630  Sod Citrate-Citric Acid (BICITRA)  oral solution 30 mL  Once         05/10/24 0532    05/10/24 0630  acetaminophen (TYLENOL) tablet 1,000 mg  Once         05/10/24 0532    05/10/24 0630  ceFAZolin 2000 mg IVPB in 100 mL NS (VTB)  Once         05/10/24 0532    05/10/24 0533  Admit To Obstetrics Inpatient  Once         05/10/24 0532    05/10/24 0533  Code Status and Medical Interventions:  Continuous,   Status:  Canceled         05/10/24 0532    05/10/24 0533  Obtain informed consent  Once         05/10/24 0532    05/10/24 0533  Vital Signs Per Hospital Policy  Per Hospital Policy,   Status:  Canceled         05/10/24 0532    05/10/24 0533  Continuous Fetal Monitoring With NST on Admission and Prior to Initiation of Oxytocin.  Per Order Details,   Status:  Canceled        Comments: Continuous Fetal Monitoring With NST on Admission    05/10/24 0532    05/10/24 0533  External Uterine Contraction Monitoring  Per Hospital Policy,   Status:  Canceled         05/10/24 0532    05/10/24 0533  Notify Physician (specified)  Until Discontinued,   Status:  Canceled         05/10/24 0532    05/10/24 0533  Notify physician for tachysystole (per hospital algorithm)  Until Discontinued,   Status:  Canceled         05/10/24 0532    05/10/24 0533  Notify physician if membranes ruptured, bleeding greater than 1 pad an hour, fetal heart tone abnormality, and severe pain  Until Discontinued,   Status:  Canceled         05/10/24 0532    05/10/24 0533  Up as Tolerated  Until Discontinued,   Status:  Canceled         05/10/24 0532    05/10/24 0533  Initiate Group Beta Strep (GBS) Prophylaxis Protocol, If Criteria Met  Continuous,   Status:  Canceled        Comments: NO TREATMENT RECOMMENDED IF: 1)  Maternal GBS status known negative 2)  Scheduled  birth with intact membranes, not in labor.  3 ) Maternal GBS unknown, no risk factors.   TREAT WITH ANTIBIOTICS IF:  1)  Maternal GBS status is known postive.  2)  Maternal GBS status unknown with these risk factors:  a)   "Previous infant affected by GBS infection.  b)  GBS urinary tract infection (UTI) or bacteruria during pregnancy  c)  Unexplained maternal fever in labor (greater than or equal to 100.4F or 38.0C)  d)  Prolonged rupture of the membranes greater than or equal to 18 hours.  e)  Gestational age less than 37 weeks.    05/10/24 0532    05/10/24 0533  Insert Indwelling Urinary Catheter  Once,   Status:  Canceled        Placed in \"And\" Linked Group    05/10/24 0532    05/10/24 0533  Assess Need for Indwelling Urinary Catheter - Follow Removal Protocol  Continuous,   Status:  Canceled        Comments: Indwelling Urinary Catheter Removal Criteria  Discontinue Indwelling Urinary Catheter Unless One of the Following is Present  Urinary Retention or Obstruction  Chronic Marin Catheter Use  End of Life  Critical Illness with Strict I/O   Tract or Abdominal Surgery  Stage 3/4 Sacral / Perineal Wound  Required Activity Restriction: Trauma  Required Activity Restriction: Spine Surgery  If Patient is Being Followed by Urology Contact Them PRIOR to Removal  Do Not Remove Indwelling Urinary Catheter Order is Present with a CLINICAL REASON to Maintain the Catheter. Provider is Required to Include a Clinical Reason to Maintain a Urinary Catheter    Chronic Marin Catheter Use (Present on Admission)  Assess for Continued Need & Document Medical Necessity  If Infection is Suspected, Contact the Provider       Placed in \"And\" Linked Group    05/10/24 0532    05/10/24 0533  Chlorhexadine Skin Prep Unless Otherwise Indicated  Once,   Status:  Canceled         05/10/24 0532    05/10/24 0533  SCD (sequential compression devices)  Once,   Status:  Canceled         05/10/24 0532    05/10/24 0533  Document Gatorade Consumption Prior to Admission (Yes or No)  Once         05/10/24 0532    05/10/24 0533  NPO Diet NPO Type: Ice Chips  Diet Effective Now,   Status:  Canceled         05/10/24 0532    05/10/24 0533  Type & Screen  Once         " "05/10/24 0532    05/10/24 0533  T Pallidum Antibody w/ reflex RPR (Syphilis)  Once         05/10/24 0532    05/10/24 0533  CBC & Differential  STAT         05/10/24 0532    05/10/24 0533  Urinalysis With Culture If Indicated - Urine, Clean Catch  Once         05/10/24 0532    05/10/24 0533  Insert Peripheral IV  Once,   Status:  Canceled         05/10/24 0532    05/10/24 0533  Saline Lock & Maintain IV Access  Continuous,   Status:  Canceled         05/10/24 0532    05/10/24 0533  CBC Auto Differential  PROCEDURE ONCE         05/10/24 0532    05/10/24 0532  Urinary Catheter Care  Every Shift,   Status:  Canceled      Placed in \"And\" Linked Group    05/10/24 0532    05/10/24 0532  sodium chloride 0.9 % flush 10 mL  As Needed,   Status:  Discontinued         05/10/24 0532    05/10/24 0532  sodium chloride 0.9 % infusion 40 mL  As Needed,   Status:  Discontinued         05/10/24 0532    05/10/24 0532  lidocaine (XYLOCAINE) 1 % injection 0.5 mL  Once As Needed,   Status:  Discontinued         05/10/24 0532    Unscheduled  IV Site Care  As Needed       05/10/24 0913    Unscheduled  Oxygen Therapy- Nasal Cannula; 2 LPM  As Needed       05/10/24 0913    Unscheduled  Blood Gas, Arterial -With Co-Ox Panel: Yes  As Needed       05/10/24 0913    Unscheduled  Up with Assistance  As Needed       05/10/24 1137    Unscheduled  Bladder Scan if Patient Unable to Void 4-6 Hours After Catheter Removal  As Needed         05/10/24 1137    Unscheduled  Straight Cath Every 4-6 Hours As Needed If Patient is Unable to Void After 4-6 Hours, Bladder Scan Volume is Greater Than 500mL & Patient Has Symptoms of Bladder Discomfort / Distention  As Needed       05/10/24 1137    Unscheduled  Schedule / Prompt Voiding For Patients With Urinary Incontinence  As Needed       05/10/24 1137    Unscheduled  Chewing Gum  As Needed       05/10/24 1137    Unscheduled  Apply witch hazel pads / TUCKS to perineum as needed for comfort PRN  As Needed       " 05/10/24 1137    Unscheduled  Warm compress  As Needed       05/10/24 1137    Unscheduled  Apply ace wrap, tight bra, or binder  As Needed       05/10/24 1137    Unscheduled  Apply ice packs  As Needed       05/10/24 1137    Unscheduled  Bladder Assessment  As Needed       05/10/24 0913                  Physician Progress Notes (most recent note)    No notes of this type exist for this encounter.       Consult Notes (most recent note)    No notes of this type exist for this encounter.

## 2024-05-10 NOTE — PLAN OF CARE
Goal Outcome Evaluation:  Plan of Care Reviewed With: patient           Outcome Evaluation: VSS, fundal and lochia wnl, pain well controlled. breastfeeding progressing. hand pump given.

## 2024-05-10 NOTE — H&P
[facility]  Lorena Keen  :   1990  MRN:   [id number]  CSN:    [id number]     History and Physical        Subjective  Lorena Keen is a 34 y.o. year old  who present for surgery due to intrauterine pregnancy at 39 weeks, prior  section  Medical History        Past Medical History:   Diagnosis Date    Anal fissure      Carpal tunnel syndrome 2016    Depression 2016    Gastroesophageal reflux disease 2016    Generalized anxiety disorder 2016    Irregular uterine bleeding       Metorrhagia.Pt states she had been spotting for approx 2 weeks, but this has stopped. PT usually has cycle every 3 months.     Irritable bowel syndrome 2016    Migraine 2016    Ovarian cyst      PMS (premenstrual syndrome)      Urinary tract infection      Varicella           Surgical History         Past Surgical History:   Procedure Laterality Date     SECTION N/A 10/24/2022     Procedure:  SECTION PRIMARY;  Surgeon: Brenden Swan MD;  Location: [facility];  Service: Obstetrics/Gynecology;  Laterality: N/A;           Tobacco Use History   Social History    Tobacco Use      Smoking status: Former        Packs/day: 0.25        Types: Cigarettes      Smokeless tobacco: Never      Tobacco comments: 2016: Former Smoker        Current Medications   No current facility-administered medications for this encounter.     Current Outpatient Medications:     ferrous gluconate (FERGON) 324 MG tablet, Take 1 tablet by mouth Daily With Breakfast., Disp: , Rfl:     ferrous gluconate (FERGON) 324 MG tablet, Take 1 tablet by mouth Daily With Breakfast., Disp: 30 tablet, Rfl: 3    glucose blood test strip, Fasting, 1 hour after breakfast/lunch/dinner.  Total for blood sugar checks a day., Disp: 120 each, Rfl: 12    glucose monitor monitoring kit, Fasting, 1 hour after breakfast/lunch/dinner.  Total for blood sugar checks a day., Disp: 1 each, Rfl: 0    Lancets  "(freestyle) lancets, Fasting, 1 hour after breakfast/lunch/dinner.  Total for blood sugar checks a day., Disp: 120 each, Rfl: 12    ondansetron ODT (ZOFRAN-ODT) 8 MG disintegrating tablet, Take 1 tablet by mouth Every 8 (Eight) Hours As Needed for Nausea or Vomiting., Disp: 12 tablet, Rfl: 0    pantoprazole (PROTONIX) 40 MG EC tablet, Take 1 tablet by mouth Daily., Disp: 30 tablet, Rfl: 6    prenatal vitamin (prenatal, CLASSIC, vitamin) tablet, Take  by mouth Daily., Disp: , Rfl:         Allergies         Allergies   Allergen Reactions    Fluoxetine Other (See Comments)       STATES \"THEY MAKE ME WANT TO KILL MYSELF\" WORSE DEPRESSION    Sulfa Antibiotics Hives       Sulfa Drugs    Trazodone Other (See Comments)       TraZODone HCl TABS  LOSS OF SHORT TERM MEMORY             Review of Systems   Constitutional: Negative.    HENT: Negative.     Respiratory: Negative.     Cardiovascular: Negative.                Objective  LMP 08/09/2023   General: well developed; well nourished  no acute distress   Heart: regular rate and rhythm, S1, S2 normal, no murmur, click, rub or gallop   Lungs: breathing is unlabored  clear to auscultation bilaterally   Abdomen: soft, non-tender; no masses  no umbilical or inguinal hernias are present  no hepato-splenomegaly   Pelvis:: External genitalia:  normal appearance of the external genitalia including Bartholin's and Jessie's glands.   Labs        Lab Results   Component Value Date      03/11/2024     HGB 11.7 (L) 03/11/2024     HCT 35.2 03/11/2024     WBC 12.95 (H) 03/11/2024      01/26/2020     K 3.5 01/26/2020      01/26/2020     CO2 27.0 01/26/2020     BUN 9 01/26/2020     CREATININE 0.78 01/26/2020     GLUCOSE 102 (H) 01/26/2020     ALBUMIN 4.80 01/26/2020     CALCIUM 9.6 01/26/2020     AST 22 01/26/2020     ALT 17 01/26/2020     BILITOT 0.5 01/26/2020               Assessment  IUP @ 39 weeks  Prior C/S           Plan  R C/S              2. Risks, complications, " benefits, and other alternatives discussed.

## 2024-05-10 NOTE — OP NOTE
Artis Keen  : 1990  MRN: [id number]  [facility]: [id number]    Operative Report    Pre-Operative Dx:   IUP at 39w2d weeks   Macrosomia  Previous  section - not a  candidate      Postoperative dx:    Same as above     Procedure: Repeat  (LTCS)       Surgeon: Brenden Swan MD   Assistant: Staff  was responsible for performing the following activities: Retraction and Placing Dressing and their skilled assistance was necessary for the success of this case.         Anesthesia: Spinal        EBL: <950 mls.       Antibiotics: cefazolin 2 gms     Drains: Rose     Findings: VMI, 3VC, intact placenta, normal adnexa       Indications: Patient is a 34-year-old presenting for repeat at term.           Procedure Details:   After the appropriate time out, the patient was prepped and draped in the usual sterile fashion.  She had been placed in the dorsal supine left lateral tilt position.  A rose catheter had been placed in the bladder for drainage during the procedure. A pfannenstiel skin incision was made with a knife and carried down to the fascia.  The fascia was nicked with a scalpel and the incision was extended bilaterally with curved Nelson scissors. The superior aspect of the fascia was grasped with Kocher clamps x 2 and bluntly as well as sharply dissected away from the underlying rectus muscles.  A similar process was repeated inferiorly. The rectus muscles were  and the peritoneal cavity was entered sharply without complications. The bladder flap was created with Metzenbaum scissors and pickups with teeth.  The  retractor was placed and after checking for no entrapment, was retracted down.  A transverse uterine incision was made with the knife and extended bilaterally.  The infant was delivered from the vertex presentation.  The mouth and nose were bulb suctioned.  The cord was doubly clamped and cut and the infant handed to the pediatric nurse in  attendance.  Cord blood was obtained.  The placenta was manually extracted from the uterus.  The uterus was then elevated from the abdomen and wiped free of remaining membranes.  The uterine incision was closed with #1 chromic in a running locking fashion with a second imbricating stitch.  The uterus had been returned to the abdomen.  The lateral gutters were wiped free of remaining clots.  Ute suture 2-0 chromic x 1 was used in the midline to complete the hemostatic process.  The site of surgical incision was inspected and noted to be hemostatic.  The  retractor was removed.  The subfascial tissue was inspected and noted to be hemostatic.  Peritoneum was closed with 3-0 Vicryl suture in a running fashion.  The fascia was closed with 0 PDS in a running non-locking fashion.  Subcutaneous tissue was inspected and noted to be hemostatic with minimal bovie electrocautery.  Ruben's fascia was closed with 3-0 Vicryl in a running non-locking fashion.  The skin was approximated with 4-0 Vicryl on a Jah needle in a running subcuticular fashion.  Glue and Steris placed.. Dressings were placed.  All instrument and sponge counts were correct at the end of the procedure. The patient tolerated the procedure well.  There were no complications.  She was taken to postoperative recovery room in stable condition.          Complications:   None      Disposition:   Mother to Mother Baby/Postpartum  in stable condition currently.   Baby to NBN  in stable condition currently.     Brenden Swan MD  5/10/2024  08:43 EDT

## 2024-05-11 LAB
BACTERIA SPEC AEROBE CULT: NORMAL
BASOPHILS # BLD AUTO: 0.02 10*3/MM3 (ref 0–0.2)
BASOPHILS NFR BLD AUTO: 0.2 % (ref 0–1.5)
DEPRECATED RDW RBC AUTO: 52.3 FL (ref 37–54)
EOSINOPHIL # BLD AUTO: 0.06 10*3/MM3 (ref 0–0.4)
EOSINOPHIL NFR BLD AUTO: 0.6 % (ref 0.3–6.2)
ERYTHROCYTE [DISTWIDTH] IN BLOOD BY AUTOMATED COUNT: 16.1 % (ref 12.3–15.4)
HCT VFR BLD AUTO: 33.5 % (ref 34–46.6)
HGB BLD-MCNC: 10.9 G/DL (ref 12–15.9)
IMM GRANULOCYTES # BLD AUTO: 0.04 10*3/MM3 (ref 0–0.05)
IMM GRANULOCYTES NFR BLD AUTO: 0.4 % (ref 0–0.5)
LYMPHOCYTES # BLD AUTO: 1.3 10*3/MM3 (ref 0.7–3.1)
LYMPHOCYTES NFR BLD AUTO: 13.3 % (ref 19.6–45.3)
MCH RBC QN AUTO: 29.4 PG (ref 26.6–33)
MCHC RBC AUTO-ENTMCNC: 32.5 G/DL (ref 31.5–35.7)
MCV RBC AUTO: 90.3 FL (ref 79–97)
MONOCYTES # BLD AUTO: 0.48 10*3/MM3 (ref 0.1–0.9)
MONOCYTES NFR BLD AUTO: 4.9 % (ref 5–12)
NEUTROPHILS NFR BLD AUTO: 7.89 10*3/MM3 (ref 1.7–7)
NEUTROPHILS NFR BLD AUTO: 80.6 % (ref 42.7–76)
NRBC BLD AUTO-RTO: 0 /100 WBC (ref 0–0.2)
PLATELET # BLD AUTO: 206 10*3/MM3 (ref 140–450)
PMV BLD AUTO: 8.8 FL (ref 6–12)
RBC # BLD AUTO: 3.71 10*6/MM3 (ref 3.77–5.28)
WBC NRBC COR # BLD AUTO: 9.79 10*3/MM3 (ref 3.4–10.8)

## 2024-05-11 PROCEDURE — 25010000002 ENOXAPARIN PER 10 MG: Performed by: OBSTETRICS & GYNECOLOGY

## 2024-05-11 PROCEDURE — 90471 IMMUNIZATION ADMIN: CPT | Performed by: OBSTETRICS & GYNECOLOGY

## 2024-05-11 PROCEDURE — 25010000002 TETANUS-DIPHTH-ACELL PERTUSSIS 5-2.5-18.5 LF-MCG/0.5 SUSPENSION PREFILLED SYRINGE: Performed by: OBSTETRICS & GYNECOLOGY

## 2024-05-11 PROCEDURE — 85025 COMPLETE CBC W/AUTO DIFF WBC: CPT | Performed by: OBSTETRICS & GYNECOLOGY

## 2024-05-11 PROCEDURE — 90715 TDAP VACCINE 7 YRS/> IM: CPT | Performed by: OBSTETRICS & GYNECOLOGY

## 2024-05-11 PROCEDURE — 0503F POSTPARTUM CARE VISIT: CPT | Performed by: OBSTETRICS & GYNECOLOGY

## 2024-05-11 RX ADMIN — IBUPROFEN 800 MG: 800 TABLET, FILM COATED ORAL at 12:40

## 2024-05-11 RX ADMIN — IBUPROFEN 800 MG: 800 TABLET, FILM COATED ORAL at 17:51

## 2024-05-11 RX ADMIN — DOCUSATE SODIUM 100 MG: 100 CAPSULE, LIQUID FILLED ORAL at 08:11

## 2024-05-11 RX ADMIN — OXYCODONE HYDROCHLORIDE 10 MG: 5 TABLET ORAL at 15:31

## 2024-05-11 RX ADMIN — PRENATAL VITAMINS-IRON FUMARATE 27 MG IRON-FOLIC ACID 0.8 MG TABLET 1 TABLET: at 08:11

## 2024-05-11 RX ADMIN — ACETAMINOPHEN 650 MG: 325 TABLET ORAL at 15:32

## 2024-05-11 RX ADMIN — ACETAMINOPHEN 1000 MG: 500 TABLET, FILM COATED ORAL at 03:21

## 2024-05-11 RX ADMIN — TETANUS TOXOID, REDUCED DIPHTHERIA TOXOID AND ACELLULAR PERTUSSIS VACCINE, ADSORBED 0.5 ML: 5; 2.5; 8; 8; 2.5 SUSPENSION INTRAMUSCULAR at 15:25

## 2024-05-11 RX ADMIN — ENOXAPARIN SODIUM 40 MG: 100 INJECTION SUBCUTANEOUS at 21:02

## 2024-05-11 RX ADMIN — ENOXAPARIN SODIUM 40 MG: 100 INJECTION SUBCUTANEOUS at 08:11

## 2024-05-11 RX ADMIN — ACETAMINOPHEN 1000 MG: 500 TABLET, FILM COATED ORAL at 08:10

## 2024-05-11 RX ADMIN — IBUPROFEN 800 MG: 800 TABLET, FILM COATED ORAL at 05:22

## 2024-05-11 RX ADMIN — ACETAMINOPHEN 650 MG: 325 TABLET ORAL at 21:01

## 2024-05-11 RX ADMIN — OXYCODONE HYDROCHLORIDE 10 MG: 5 TABLET ORAL at 09:14

## 2024-05-11 NOTE — PLAN OF CARE
Goal Outcome Evaluation:              Outcome Evaluation: VSS, adequate I/O, pain managed with ordered medication, lochia WDL, breastfeeding well, positive bonding observed with baby                                room air 2+ edema

## 2024-05-11 NOTE — PROGRESS NOTES
.. Artis Keen  : 1990  MRN: [id number]  CSN: [id number]    Postpartum Day #1  Subjective   Her pain is well controlled. Vaginal bleeding is normal in amount. She is ambulating without difficulty. She is passing gas. She is voiding without difficulty. Her baby is doing well..     Objective     Min/max vitals past 24 hours:   Temp  Min: 97.9 °F (36.6 °C)  Max: 98.5 °F (36.9 °C)  BP  Min: 97/59  Max: 119/79  Pulse  Min: 79  Max: 108  Resp  Min: 16  Max: 18        General: well developed; well nourished  no acute distress   Abdomen: soft, non-tender; no masses  no umbilical or inguinal hernias are present  no hepato-splenomegaly  incision is covered by bandage   Pelvic: Not performed   Ext: Calves NT     Lab Results   Component Value Date    WBC 9.79 2024    HGB 10.9 (L) 2024    HCT 33.5 (L) 2024    MCV 90.3 2024     2024    RUBELLAABIGG 2.87 2023    ABO O 05/10/2024    RH Positive 05/10/2024    HEPBSAG Negative 2023        Assessment   Postpartum Day #1 S/P Repeat  (LTCS)     Plan   Continue routine postpartum care  Continue routine post-operative care    Brenden Swan MD  2024  09:39 EDT

## 2024-05-11 NOTE — PLAN OF CARE
Goal Outcome Evaluation:  Plan of Care Reviewed With: patient        Progress: improving  Outcome Evaluation: VSS, pain well controlled with PO  meds, fundal and lochia wnl. incision clean and dry. patient ambulated and voided without difficulty. anticipate discharge tomorrow.

## 2024-05-12 VITALS
OXYGEN SATURATION: 96 % | BODY MASS INDEX: 43.43 KG/M2 | HEIGHT: 62 IN | WEIGHT: 236 LBS | HEART RATE: 97 BPM | DIASTOLIC BLOOD PRESSURE: 64 MMHG | TEMPERATURE: 98.3 F | RESPIRATION RATE: 18 BRPM | SYSTOLIC BLOOD PRESSURE: 97 MMHG

## 2024-05-12 PROCEDURE — 25010000002 ENOXAPARIN PER 10 MG: Performed by: OBSTETRICS & GYNECOLOGY

## 2024-05-12 PROCEDURE — 0503F POSTPARTUM CARE VISIT: CPT | Performed by: OBSTETRICS & GYNECOLOGY

## 2024-05-12 RX ORDER — ACETAMINOPHEN 325 MG/1
650 TABLET ORAL EVERY 8 HOURS PRN
Qty: 30 TABLET | Refills: 1 | Status: SHIPPED | OUTPATIENT
Start: 2024-05-12

## 2024-05-12 RX ORDER — PSEUDOEPHEDRINE HCL 30 MG
100 TABLET ORAL 2 TIMES DAILY PRN
Qty: 60 EACH | Refills: 1 | Status: SHIPPED | OUTPATIENT
Start: 2024-05-12

## 2024-05-12 RX ORDER — OXYCODONE HYDROCHLORIDE 5 MG/1
5 TABLET ORAL EVERY 6 HOURS PRN
Qty: 8 TABLET | Refills: 0 | Status: SHIPPED | OUTPATIENT
Start: 2024-05-12 | End: 2024-05-15

## 2024-05-12 RX ORDER — IBUPROFEN 800 MG/1
800 TABLET ORAL EVERY 8 HOURS PRN
Qty: 30 TABLET | Refills: 1 | Status: SHIPPED | OUTPATIENT
Start: 2024-05-12

## 2024-05-12 RX ADMIN — IBUPROFEN 800 MG: 800 TABLET, FILM COATED ORAL at 05:54

## 2024-05-12 RX ADMIN — ACETAMINOPHEN 650 MG: 325 TABLET ORAL at 03:16

## 2024-05-12 RX ADMIN — OXYCODONE HYDROCHLORIDE 5 MG: 5 TABLET ORAL at 12:23

## 2024-05-12 RX ADMIN — PRENATAL VITAMINS-IRON FUMARATE 27 MG IRON-FOLIC ACID 0.8 MG TABLET 1 TABLET: at 08:14

## 2024-05-12 RX ADMIN — ACETAMINOPHEN 650 MG: 325 TABLET ORAL at 08:15

## 2024-05-12 RX ADMIN — IBUPROFEN 800 MG: 800 TABLET, FILM COATED ORAL at 00:22

## 2024-05-12 RX ADMIN — ENOXAPARIN SODIUM 40 MG: 100 INJECTION SUBCUTANEOUS at 08:14

## 2024-05-12 RX ADMIN — IBUPROFEN 800 MG: 800 TABLET, FILM COATED ORAL at 12:23

## 2024-05-12 NOTE — PLAN OF CARE
Goal Outcome Evaluation:              Outcome Evaluation: VSS, bonding well with infant, pain controlled with rx meds, lochia wnl. Ambulation encouraged. Pt became upset earlier in shift while attempting to breastfeed infant, pt stated that she was having trouble getting infant to latch. Pt began crying and stated that she was overwhelmed and frustrated. Education and help offered with breastfeeding but pt declined and ask for a bottle of formula stating that she just needed a break. Bottle given and help with infant offered by this nurse and nursery staff. Mother requested pt to stay a few hours in nursery so she could rest for a few hours during the night. Infant eventually back with mother and mother proceeded with breastfeeding. No acute concerns at this time. Anticipate d/c tomorrow. Continue plan of care.

## 2024-05-12 NOTE — PLAN OF CARE
Goal Outcome Evaluation:  Plan of Care Reviewed With: patient        Progress: improving  Outcome Evaluation: VSS, pain well controlled with PO meds, fundal and lochia wnl. incision clean and dry, steri strips intact. breastfeeding progressing. Mother educated on importance of breastfeeding and not giving infant bottle. Discharge teaching given and patient verbalized understanding. Patient to follow up with OB provider in one week.

## 2024-05-12 NOTE — DISCHARGE SUMMARY
.. Artis Keen  : 1990  MRN: [id number]  CSN: [id number]    Discharge Summary      Date of Admission: 5/10/2024   Date of Discharge: 2024   Discharge Diagnoses: IUP @ 39 + weeks  Prior C/S  LGA   Procedures Performed: R C/S   Consults: none   Brief History: Patient is a 34 y.o.who presented 39 weeks 2 days for repeat  section.  Underwent uneventful repeat  and post partum course was benign.  Ready for discharge on day 2..   Hospital Course: Benign   Pending Studies: None   Condition at discharge: completely resolved   Discharge Medications:    Your medication list        START taking these medications        Instructions Last Dose Given Next Dose Due   acetaminophen 325 MG tablet  Commonly known as: TYLENOL      Take 2 tablets by mouth Every 8 (Eight) Hours As Needed for Mild Pain.       docusate sodium 100 MG capsule      Take 1 capsule by mouth 2 (Two) Times a Day As Needed for Constipation.       ibuprofen 800 MG tablet  Commonly known as: ADVIL,MOTRIN      Take 1 tablet by mouth Every 8 (Eight) Hours As Needed for Mild Pain.       oxyCODONE 5 MG immediate release tablet  Commonly known as: ROXICODONE      Take 1 tablet by mouth Every 6 (Six) Hours As Needed for Severe Pain for up to 3 days.              CHANGE how you take these medications        Instructions Last Dose Given Next Dose Due   ferrous gluconate 324 MG tablet  Commonly known as: FERGON  What changed: Another medication with the same name was removed. Continue taking this medication, and follow the directions you see here.      Take 1 tablet by mouth Daily With Breakfast.              CONTINUE taking these medications        Instructions Last Dose Given Next Dose Due   freestyle lancets      Fasting, 1 hour after breakfast/lunch/dinner.  Total for blood sugar checks a day.       glucose monitor monitoring kit      Fasting, 1 hour after breakfast/lunch/dinner.  Total for blood sugar checks a day.        pantoprazole 40 MG EC tablet  Commonly known as: PROTONIX      Take 1 tablet by mouth Daily.       prenatal (CLASSIC) vitamin 28-0.8 MG tablet tablet  Generic drug: prenatal vitamin      Take  by mouth Daily.              STOP taking these medications      glucose blood test strip        ondansetron ODT 8 MG disintegrating tablet  Commonly known as: ZOFRAN-ODT                  Where to Get Your Medications        These medications were sent to [facility] [address] [place] [address] - [address] [place] [address] - [phone] [place] [phone] [address] [address]      Phone: [phone]   acetaminophen 325 MG tablet  docusate sodium 100 MG capsule  ibuprofen 800 MG tablet  oxyCODONE 5 MG immediate release tablet        Discharge Disposition: home   Follow-up: Future Appointments   Date Time Provider Department Center   5/23/2024 10:30 AM Africa Camilo CNM [facility]            This note has been electronically signed.    Brenden Swan MD  May 12, 2024

## 2024-05-12 NOTE — PROGRESS NOTES
.. Artis Keen  : 1990  MRN: [id number]  CSN: [id number]    Postpartum Day #2  Subjective   Her pain is well controlled. Vaginal bleeding is normal in amount. She is ambulating without difficulty. She is passing gas. She is voiding without difficulty. Her baby is doing well..     Objective     Min/max vitals past 24 hours:   Temp  Min: 98.1 °F (36.7 °C)  Max: 98.3 °F (36.8 °C)  BP  Min: 94/63  Max: 114/74  Pulse  Min: 90  Max: 97  Resp  Min: 16  Max: 18        General: well developed; well nourished  no acute distress   Abdomen: soft, non-tender; no masses  no umbilical or inguinal hernias are present  no hepato-splenomegaly  incision is clean, dry, intact, and without drainage   Pelvic: Not performed   Ext: Calves NT     Lab Results   Component Value Date    WBC 9.79 2024    HGB 10.9 (L) 2024    HCT 33.5 (L) 2024    MCV 90.3 2024     2024    RUBELLAABIGG 2.87 2023    ABO O 05/10/2024    RH Positive 05/10/2024    HEPBSAG Negative 2023        Assessment   Postpartum Day #2 S/P Repeat  (LTCS)     Plan   Continue routine postpartum care  Continue routine post-operative care  Discharge to home    Brenden Swan MD  2024  09:36 EDT

## 2024-05-13 NOTE — PAYOR COMM NOTE
"To:  [facility]  From: Steph Vila RN  Phone: [phone]  Fax: [phone]  NPI: [id number]  TIN: [id number]  Member ID: [id number]   MRN: [id number]    Virgil Keen (34 y.o. Female)       Date of Birth   1990    Social Security Number       Address   [address] [facility] [address]    Home Phone   [phone]    MRN   [id number]       Latter day   None    Marital Status   Single                            Admission Date   5/10/24    Admission Type   Elective    Admitting Provider   Brenden Swan MD    Attending Provider       Department, Room/Bed   [facility] OB [facility], W2       Discharge Date   2024    Discharge Disposition   Home or Self Care    Discharge Destination                                 Attending Provider: (none)   Allergies: Fluoxetine, Sulfa Antibiotics, Trazodone    Isolation: None   Infection: None   Code Status: Prior    Ht: 157.5 cm (62\")   Wt: 107 kg (236 lb)    Admission Cmt: None   Principal Problem: None                  Active Insurance as of 5/10/2024       Primary Coverage       Payor Plan Insurance Group Employer/Plan Group    [facility] COMMUNITY PLAN [facility] COMMUNITY PLAN [facility]       Payor Plan Address Payor Plan Phone Number Payor Plan Fax Number Effective Dates    [address]   2022 - None Entered    [facility] [address]         Subscriber Name Subscriber Birth Date Member ID       VIRGIL KEEN 1990 [id number]                     Emergency Contacts        (Rel.) Home Phone Work Phone Mobile Phone    PATI ADEN (Significant Other) -- -- [phone]    OsmaniBess (Mother) -- -- [phone]                 Discharge Summary        Brenden Swan MD at 24 0939          [facility]  Virgil Keen  : 1990  MRN: [id number]  CSN: [id number]    Discharge Summary      Date of Admission: 5/10/2024   Date of Discharge: 2024   Discharge Diagnoses: IUP @ 39 + weeks  Prior " C/S  LGA   Procedures Performed: R C/S   Consults: none   Brief History: Patient is a 34 y.o.who presented 39 weeks 2 days for repeat  section.  Underwent uneventful repeat  and post partum course was benign.  Ready for discharge on day 2..   Hospital Course: Benign   Pending Studies: None   Condition at discharge: completely resolved   Discharge Medications:    Your medication list        START taking these medications        Instructions Last Dose Given Next Dose Due   acetaminophen 325 MG tablet  Commonly known as: TYLENOL      Take 2 tablets by mouth Every 8 (Eight) Hours As Needed for Mild Pain.       docusate sodium 100 MG capsule      Take 1 capsule by mouth 2 (Two) Times a Day As Needed for Constipation.       ibuprofen 800 MG tablet  Commonly known as: ADVIL,MOTRIN      Take 1 tablet by mouth Every 8 (Eight) Hours As Needed for Mild Pain.       oxyCODONE 5 MG immediate release tablet  Commonly known as: ROXICODONE      Take 1 tablet by mouth Every 6 (Six) Hours As Needed for Severe Pain for up to 3 days.              CHANGE how you take these medications        Instructions Last Dose Given Next Dose Due   ferrous gluconate 324 MG tablet  Commonly known as: FERGON  What changed: Another medication with the same name was removed. Continue taking this medication, and follow the directions you see here.      Take 1 tablet by mouth Daily With Breakfast.              CONTINUE taking these medications        Instructions Last Dose Given Next Dose Due   freestyle lancets      Fasting, 1 hour after breakfast/lunch/dinner.  Total for blood sugar checks a day.       glucose monitor monitoring kit      Fasting, 1 hour after breakfast/lunch/dinner.  Total for blood sugar checks a day.       pantoprazole 40 MG EC tablet  Commonly known as: PROTONIX      Take 1 tablet by mouth Daily.       prenatal (CLASSIC) vitamin 28-0.8 MG tablet tablet  Generic drug: prenatal vitamin      Take  by mouth Daily.               STOP taking these medications      glucose blood test strip        ondansetron ODT 8 MG disintegrating tablet  Commonly known as: ZOFRAN-ODT                  Where to Get Your Medications        These medications were sent to [facility] [address] [place] [address] - [address] [place] [address] - [phone]  - [phone]  [facility] [address]      Phone: [phone]   acetaminophen 325 MG tablet  docusate sodium 100 MG capsule  ibuprofen 800 MG tablet  oxyCODONE 5 MG immediate release tablet        Discharge Disposition: home   Follow-up: Future Appointments   Date Time Provider Department Center   5/23/2024 10:30 AM Africa Camilo CNM [facility]            This note has been electronically signed.    Brenden Swan MD  May 12, 2024        Electronically signed by Brenden Swan MD at 05/12/24 0940

## 2024-05-23 ENCOUNTER — OFFICE VISIT (OUTPATIENT)
Dept: OBSTETRICS AND GYNECOLOGY | Facility: CLINIC | Age: 34
End: 2024-05-23
Payer: MEDICAID

## 2024-05-23 VITALS
WEIGHT: 222 LBS | DIASTOLIC BLOOD PRESSURE: 80 MMHG | SYSTOLIC BLOOD PRESSURE: 118 MMHG | HEIGHT: 62 IN | BODY MASS INDEX: 40.85 KG/M2

## 2024-05-23 DIAGNOSIS — Z09 POSTOP CHECK: Primary | ICD-10-CM

## 2024-05-23 PROCEDURE — 99024 POSTOP FOLLOW-UP VISIT: CPT | Performed by: MIDWIFE

## 2024-05-23 NOTE — PROGRESS NOTES
"    Postpartum Progress Note    Patient name: Lorena Keen  Date of Service: 2024    ID: 34 y.o.     Diagnosis:   Chief Complaint   Patient presents with    Post-op Follow-up     13 day postop  C Section, No Complaints/concerns       2 weeks postpartum  section                                                                                                                                                       Patient Active Problem List   Diagnosis    Carpal tunnel syndrome    Depression    Generalized anxiety disorder    Gastroesophageal reflux disease    Irritable bowel syndrome    Migraine    OCD (obsessive compulsive disorder)    Thoracic myofascial strain    Tobacco abuse    Previous  section    Short interval between pregnancies affecting pregnancy, antepartum    Pregnancy       Subjective:      No complaints, Lochia light .  Ambulating, B&B habits wnl, tolerating regular diet  Pain well controlled. She is taking Ibuprofen as needed  The patient is currently breastfeeding.       Objective:      Vital signs:  /80   Ht 157.5 cm (62\")   Wt 101 kg (222 lb)   LMP 2023 Comment: pumping until 6 weeks  Breastfeeding Yes   BMI 40.60 kg/m²    General: Alert & oriented x4, in no apparent distress  Abdomen: soft, nontender  Uterus: firm, nontender  Incision: healing well  Extremities: nontender; no edema      Labs:  Lab Results   Component Value Date    WBC 9.79 2024    HGB 10.9 (L) 2024    HCT 33.5 (L) 2024    MCV 90.3 2024     2024             Assessment/Plan:        1. S/p  delivery: Doing well.  2. Infant feeding: Supportive care.  The patient is currently breastfeeding. Plans to stop at 6 weeks  3. Contraception: Discussed options for contraception. Doesn't want IUD or Nexplanon  4. RTO 4 weeks     No orders of the defined types were placed in this encounter.      Africa Camilo CNM  2024    "

## 2024-06-17 ENCOUNTER — POSTPARTUM VISIT (OUTPATIENT)
Dept: OBSTETRICS AND GYNECOLOGY | Facility: CLINIC | Age: 34
End: 2024-06-17
Payer: MEDICAID

## 2024-06-17 VITALS
SYSTOLIC BLOOD PRESSURE: 122 MMHG | BODY MASS INDEX: 40.3 KG/M2 | DIASTOLIC BLOOD PRESSURE: 76 MMHG | HEIGHT: 62 IN | WEIGHT: 219 LBS

## 2024-06-17 DIAGNOSIS — Z30.011 ENCOUNTER FOR INITIAL PRESCRIPTION OF CONTRACEPTIVE PILLS: Primary | ICD-10-CM

## 2024-06-17 PROBLEM — Z98.891 PREVIOUS CESAREAN SECTION: Status: RESOLVED | Noted: 2023-11-29 | Resolved: 2024-06-17

## 2024-06-17 PROBLEM — Z34.90 PREGNANCY: Status: RESOLVED | Noted: 2024-04-08 | Resolved: 2024-06-17

## 2024-06-17 PROBLEM — O09.899 SHORT INTERVAL BETWEEN PREGNANCIES AFFECTING PREGNANCY, ANTEPARTUM: Status: RESOLVED | Noted: 2023-11-29 | Resolved: 2024-06-17

## 2024-06-17 PROCEDURE — 0503F POSTPARTUM CARE VISIT: CPT | Performed by: MIDWIFE

## 2024-06-17 RX ORDER — NORETHINDRONE ACETATE AND ETHINYL ESTRADIOL 1.5-30(21)
1 KIT ORAL DAILY
Qty: 28 TABLET | Refills: 12 | Status: SHIPPED | OUTPATIENT
Start: 2024-06-17 | End: 2025-06-17

## 2024-06-17 NOTE — PROGRESS NOTES
"History  Chief Complaint   Patient presents with    Postpartum Care     6 week postpartum care         Lorena Keen is a 34 y.o. year old  presenting to be seen for her postpartum visit.  She had a Repeat  (LTCS).    Since delivery she has been sexually active. She has used condoms.  She does not have concerns about post-partum blues/depression.   She is bottle feeding.  For ongoing contraception, her plans are OCP's (estrogen/progesterone).  She has had a menstrual cycle. It was last week         Physical  /76   Ht 157.5 cm (62\")   Wt 99.3 kg (219 lb)   LMP 2023 Comment: pumping until 6 weeks  Breastfeeding No   BMI 40.06 kg/m²     General:  well developed; well nourished  no acute distress   Abdomen: soft, non-tender; no masses  incision is healed   Pelvis: External genitalia:  normal appearance of the external genitalia including Bartholin's and Ventana's glands.  Uterus:  normal size, shape and consistency.  Adnexa:  normal bimanual exam of the adnexa.        Assessment   Normal 6 week postpartum exam  Contraceptive management     Plan   BC options reviewed and compared today: IUD - Mirena, NuvaRing, and OCP (estrogen/progesterone), She was instructed how to start her birth control.  It should be started on  following the onset of her next cycle.  The patient was educated regarding the correct and consistent use.  Because it may take 1 month to become effective, the use of alternative contraception for one month was stressed.  The potential for breakthrough bleeding for up to 4 cycles was also emphasized.    Pap smear normal 22  Follow up 1 year    New Medications Ordered This Visit   Medications    norethindrone-ethinyl estradiol-iron (Microgestin FE 1.530) 1.5-30 MG-MCG tablet     Sig: Take 1 tablet by mouth Daily.     Dispense:  28 tablet     Refill:  12          This note was electronically signed.  FALGUNI Carmichael  2024  "

## 2024-07-29 ENCOUNTER — TRANSCRIBE ORDERS (OUTPATIENT)
Dept: ADMINISTRATIVE | Facility: HOSPITAL | Age: 34
End: 2024-07-29
Payer: MEDICAID

## 2024-07-29 ENCOUNTER — HOSPITAL ENCOUNTER (OUTPATIENT)
Dept: GENERAL RADIOLOGY | Facility: HOSPITAL | Age: 34
Discharge: HOME | End: 2024-07-29
Payer: MEDICAID

## 2024-07-29 ENCOUNTER — TRANSCRIBE ORDERS (OUTPATIENT)
Dept: GENERAL RADIOLOGY | Facility: HOSPITAL | Age: 34
End: 2024-07-29
Payer: MEDICAID

## 2024-07-29 DIAGNOSIS — R10.11 ABDOMINAL PAIN, RIGHT UPPER QUADRANT: ICD-10-CM

## 2024-07-29 DIAGNOSIS — R10.9 ABDOMINAL PAIN, UNSPECIFIED ABDOMINAL LOCATION: ICD-10-CM

## 2024-07-29 DIAGNOSIS — R10.13 ABDOMINAL PAIN, EPIGASTRIC: Primary | ICD-10-CM

## 2024-07-29 DIAGNOSIS — R10.9 ABDOMINAL PAIN, UNSPECIFIED ABDOMINAL LOCATION: Primary | ICD-10-CM

## 2024-07-29 DIAGNOSIS — N93.9 ABNORMAL UTERINE AND VAGINAL BLEEDING, UNSPECIFIED: ICD-10-CM

## 2024-07-29 DIAGNOSIS — G43.001 MIGRAINE WITHOUT AURA, NOT INTRACTABLE, WITH STATUS MIGRAINOSUS: ICD-10-CM

## 2024-07-29 PROCEDURE — 74019 RADEX ABDOMEN 2 VIEWS: CPT

## 2024-08-07 ENCOUNTER — APPOINTMENT (OUTPATIENT)
Dept: CT IMAGING | Facility: HOSPITAL | Age: 34
End: 2024-08-07
Payer: MEDICAID

## 2024-08-07 ENCOUNTER — ANESTHESIA EVENT (OUTPATIENT)
Dept: PERIOP | Facility: HOSPITAL | Age: 34
End: 2024-08-07
Payer: MEDICAID

## 2024-08-07 ENCOUNTER — HOSPITAL ENCOUNTER (OUTPATIENT)
Facility: HOSPITAL | Age: 34
Setting detail: OBSERVATION
Discharge: HOME | End: 2024-08-08
Attending: STUDENT IN AN ORGANIZED HEALTH CARE EDUCATION/TRAINING PROGRAM | Admitting: SURGERY
Payer: MEDICAID

## 2024-08-07 ENCOUNTER — APPOINTMENT (OUTPATIENT)
Dept: ULTRASOUND IMAGING | Facility: HOSPITAL | Age: 34
End: 2024-08-07
Payer: MEDICAID

## 2024-08-07 ENCOUNTER — ANESTHESIA (OUTPATIENT)
Dept: PERIOP | Facility: HOSPITAL | Age: 34
End: 2024-08-07
Payer: MEDICAID

## 2024-08-07 DIAGNOSIS — K80.50 BILIARY COLIC: ICD-10-CM

## 2024-08-07 DIAGNOSIS — K80.00 ACUTE CALCULOUS CHOLECYSTITIS: ICD-10-CM

## 2024-08-07 DIAGNOSIS — R10.9 INTRACTABLE ABDOMINAL PAIN: ICD-10-CM

## 2024-08-07 DIAGNOSIS — K80.20 SYMPTOMATIC CHOLELITHIASIS: Primary | ICD-10-CM

## 2024-08-07 LAB
ALBUMIN SERPL-MCNC: 4 G/DL (ref 3.5–5.2)
ALBUMIN/GLOB SERPL: 1.2 G/DL
ALP SERPL-CCNC: 108 U/L (ref 39–117)
ALT SERPL W P-5'-P-CCNC: 71 U/L (ref 1–33)
ANION GAP SERPL CALCULATED.3IONS-SCNC: 9.3 MMOL/L (ref 5–15)
AST SERPL-CCNC: 38 U/L (ref 1–32)
B-HCG UR QL: NEGATIVE
BACTERIA UR QL AUTO: ABNORMAL /HPF
BASOPHILS # BLD AUTO: 0.02 10*3/MM3 (ref 0–0.2)
BASOPHILS NFR BLD AUTO: 0.3 % (ref 0–1.5)
BILIRUB SERPL-MCNC: 0.2 MG/DL (ref 0–1.2)
BILIRUB UR QL STRIP: ABNORMAL
BUN SERPL-MCNC: 11 MG/DL (ref 6–20)
BUN/CREAT SERPL: 12.1 (ref 7–25)
CALCIUM SPEC-SCNC: 9.4 MG/DL (ref 8.6–10.5)
CHLORIDE SERPL-SCNC: 103 MMOL/L (ref 98–107)
CLARITY UR: ABNORMAL
CO2 SERPL-SCNC: 25.7 MMOL/L (ref 22–29)
COLOR UR: ABNORMAL
CREAT SERPL-MCNC: 0.91 MG/DL (ref 0.57–1)
DEPRECATED RDW RBC AUTO: 45.7 FL (ref 37–54)
EGFRCR SERPLBLD CKD-EPI 2021: 86.1 ML/MIN/1.73
EOSINOPHIL # BLD AUTO: 0.09 10*3/MM3 (ref 0–0.4)
EOSINOPHIL NFR BLD AUTO: 1.6 % (ref 0.3–6.2)
ERYTHROCYTE [DISTWIDTH] IN BLOOD BY AUTOMATED COUNT: 14.2 % (ref 12.3–15.4)
GLOBULIN UR ELPH-MCNC: 3.4 GM/DL
GLUCOSE SERPL-MCNC: 119 MG/DL (ref 65–99)
GLUCOSE UR STRIP-MCNC: NEGATIVE MG/DL
HCT VFR BLD AUTO: 41.6 % (ref 34–46.6)
HGB BLD-MCNC: 13.7 G/DL (ref 12–15.9)
HGB UR QL STRIP.AUTO: ABNORMAL
HOLD SPECIMEN: NORMAL
HOLD SPECIMEN: NORMAL
HYALINE CASTS UR QL AUTO: ABNORMAL /LPF
IMM GRANULOCYTES # BLD AUTO: 0.02 10*3/MM3 (ref 0–0.05)
IMM GRANULOCYTES NFR BLD AUTO: 0.3 % (ref 0–0.5)
KETONES UR QL STRIP: ABNORMAL
LEUKOCYTE ESTERASE UR QL STRIP.AUTO: ABNORMAL
LIPASE SERPL-CCNC: 28 U/L (ref 13–60)
LYMPHOCYTES # BLD AUTO: 1.79 10*3/MM3 (ref 0.7–3.1)
LYMPHOCYTES NFR BLD AUTO: 31.2 % (ref 19.6–45.3)
MCH RBC QN AUTO: 28.7 PG (ref 26.6–33)
MCHC RBC AUTO-ENTMCNC: 32.9 G/DL (ref 31.5–35.7)
MCV RBC AUTO: 87.2 FL (ref 79–97)
MONOCYTES # BLD AUTO: 0.37 10*3/MM3 (ref 0.1–0.9)
MONOCYTES NFR BLD AUTO: 6.5 % (ref 5–12)
MUCOUS THREADS URNS QL MICRO: ABNORMAL /HPF
NEUTROPHILS NFR BLD AUTO: 3.44 10*3/MM3 (ref 1.7–7)
NEUTROPHILS NFR BLD AUTO: 60.1 % (ref 42.7–76)
NITRITE UR QL STRIP: NEGATIVE
NRBC BLD AUTO-RTO: 0 /100 WBC (ref 0–0.2)
PH UR STRIP.AUTO: 5.5 [PH] (ref 5–8)
PLATELET # BLD AUTO: 328 10*3/MM3 (ref 140–450)
PMV BLD AUTO: 8.6 FL (ref 6–12)
POTASSIUM SERPL-SCNC: 4.2 MMOL/L (ref 3.5–5.2)
PROT SERPL-MCNC: 7.4 G/DL (ref 6–8.5)
PROT UR QL STRIP: ABNORMAL
RBC # BLD AUTO: 4.77 10*6/MM3 (ref 3.77–5.28)
RBC # UR STRIP: ABNORMAL /HPF
REF LAB TEST METHOD: ABNORMAL
SODIUM SERPL-SCNC: 138 MMOL/L (ref 136–145)
SP GR UR STRIP: >=1.03 (ref 1–1.03)
SQUAMOUS #/AREA URNS HPF: ABNORMAL /HPF
TROPONIN T SERPL HS-MCNC: <6 NG/L
UROBILINOGEN UR QL STRIP: ABNORMAL
WBC # UR STRIP: ABNORMAL /HPF
WBC NRBC COR # BLD AUTO: 5.73 10*3/MM3 (ref 3.4–10.8)
WHOLE BLOOD HOLD COAG: NORMAL
WHOLE BLOOD HOLD SPECIMEN: NORMAL

## 2024-08-07 PROCEDURE — S2900 ROBOTIC SURGICAL SYSTEM: HCPCS | Performed by: SURGERY

## 2024-08-07 PROCEDURE — 96361 HYDRATE IV INFUSION ADD-ON: CPT

## 2024-08-07 PROCEDURE — G0378 HOSPITAL OBSERVATION PER HR: HCPCS

## 2024-08-07 PROCEDURE — 25010000002 HYDROMORPHONE 1 MG/ML SOLUTION: Performed by: SURGERY

## 2024-08-07 PROCEDURE — 80053 COMPREHEN METABOLIC PANEL: CPT | Performed by: STUDENT IN AN ORGANIZED HEALTH CARE EDUCATION/TRAINING PROGRAM

## 2024-08-07 PROCEDURE — 25010000002 CEFAZOLIN PER 500 MG: Performed by: SURGERY

## 2024-08-07 PROCEDURE — 76705 ECHO EXAM OF ABDOMEN: CPT

## 2024-08-07 PROCEDURE — 85025 COMPLETE CBC W/AUTO DIFF WBC: CPT | Performed by: STUDENT IN AN ORGANIZED HEALTH CARE EDUCATION/TRAINING PROGRAM

## 2024-08-07 PROCEDURE — 25010000002 BUPIVACAINE (PF) 0.25 % SOLUTION: Performed by: SURGERY

## 2024-08-07 PROCEDURE — 25010000002 INDOCYANINE GREEN 25 MG RECONSTITUTED SOLUTION: Performed by: SURGERY

## 2024-08-07 PROCEDURE — 96375 TX/PRO/DX INJ NEW DRUG ADDON: CPT

## 2024-08-07 PROCEDURE — 96376 TX/PRO/DX INJ SAME DRUG ADON: CPT

## 2024-08-07 PROCEDURE — 47563 LAPARO CHOLECYSTECTOMY/GRAPH: CPT | Performed by: SURGERY

## 2024-08-07 PROCEDURE — 25010000002 LIDOCAINE 1 % SOLUTION: Performed by: SURGERY

## 2024-08-07 PROCEDURE — 36415 COLL VENOUS BLD VENIPUNCTURE: CPT

## 2024-08-07 PROCEDURE — 25510000001 IOPAMIDOL 61 % SOLUTION: Performed by: STUDENT IN AN ORGANIZED HEALTH CARE EDUCATION/TRAINING PROGRAM

## 2024-08-07 PROCEDURE — 25010000002 PROCHLORPERAZINE 10 MG/2ML SOLUTION: Performed by: SURGERY

## 2024-08-07 PROCEDURE — 25010000002 PROPOFOL 200 MG/20ML EMULSION: Performed by: NURSE ANESTHETIST, CERTIFIED REGISTERED

## 2024-08-07 PROCEDURE — 25810000003 LACTATED RINGERS PER 1000 ML: Performed by: SURGERY

## 2024-08-07 PROCEDURE — 25010000002 ONDANSETRON PER 1 MG: Performed by: STUDENT IN AN ORGANIZED HEALTH CARE EDUCATION/TRAINING PROGRAM

## 2024-08-07 PROCEDURE — 25010000002 HYDROMORPHONE 1 MG/ML SOLUTION: Performed by: STUDENT IN AN ORGANIZED HEALTH CARE EDUCATION/TRAINING PROGRAM

## 2024-08-07 PROCEDURE — 93005 ELECTROCARDIOGRAM TRACING: CPT | Performed by: STUDENT IN AN ORGANIZED HEALTH CARE EDUCATION/TRAINING PROGRAM

## 2024-08-07 PROCEDURE — 83690 ASSAY OF LIPASE: CPT | Performed by: STUDENT IN AN ORGANIZED HEALTH CARE EDUCATION/TRAINING PROGRAM

## 2024-08-07 PROCEDURE — 84484 ASSAY OF TROPONIN QUANT: CPT | Performed by: STUDENT IN AN ORGANIZED HEALTH CARE EDUCATION/TRAINING PROGRAM

## 2024-08-07 PROCEDURE — 25010000002 ONDANSETRON PER 1 MG: Performed by: SURGERY

## 2024-08-07 PROCEDURE — 25010000002 MORPHINE PER 10 MG: Performed by: STUDENT IN AN ORGANIZED HEALTH CARE EDUCATION/TRAINING PROGRAM

## 2024-08-07 PROCEDURE — 25010000002 DEXAMETHASONE PER 1 MG: Performed by: NURSE ANESTHETIST, CERTIFIED REGISTERED

## 2024-08-07 PROCEDURE — 99285 EMERGENCY DEPT VISIT HI MDM: CPT

## 2024-08-07 PROCEDURE — 81025 URINE PREGNANCY TEST: CPT | Performed by: PHYSICIAN ASSISTANT

## 2024-08-07 PROCEDURE — 25010000002 HYDROMORPHONE PER 4 MG: Performed by: NURSE ANESTHETIST, CERTIFIED REGISTERED

## 2024-08-07 PROCEDURE — 99223 1ST HOSP IP/OBS HIGH 75: CPT | Performed by: SURGERY

## 2024-08-07 PROCEDURE — 74177 CT ABD & PELVIS W/CONTRAST: CPT

## 2024-08-07 PROCEDURE — 96374 THER/PROPH/DIAG INJ IV PUSH: CPT

## 2024-08-07 PROCEDURE — 81001 URINALYSIS AUTO W/SCOPE: CPT | Performed by: STUDENT IN AN ORGANIZED HEALTH CARE EDUCATION/TRAINING PROGRAM

## 2024-08-07 PROCEDURE — 25010000002 ONDANSETRON PER 1 MG: Performed by: NURSE ANESTHETIST, CERTIFIED REGISTERED

## 2024-08-07 PROCEDURE — 25810000003 SODIUM CHLORIDE 0.9 % SOLUTION: Performed by: STUDENT IN AN ORGANIZED HEALTH CARE EDUCATION/TRAINING PROGRAM

## 2024-08-07 DEVICE — CLIP LIG HEMOLOK PA LG 6CT PRP: Type: IMPLANTABLE DEVICE | Site: ABDOMEN | Status: FUNCTIONAL

## 2024-08-07 RX ORDER — HEPARIN SODIUM 5000 [USP'U]/ML
5000 INJECTION, SOLUTION INTRAVENOUS; SUBCUTANEOUS EVERY 8 HOURS SCHEDULED
Status: DISCONTINUED | OUTPATIENT
Start: 2024-08-08 | End: 2024-08-08 | Stop reason: HOSPADM

## 2024-08-07 RX ORDER — SODIUM CHLORIDE 0.9 % (FLUSH) 0.9 %
10 SYRINGE (ML) INJECTION AS NEEDED
Status: DISCONTINUED | OUTPATIENT
Start: 2024-08-07 | End: 2024-08-08 | Stop reason: HOSPADM

## 2024-08-07 RX ORDER — ONDANSETRON 2 MG/ML
4 INJECTION INTRAMUSCULAR; INTRAVENOUS ONCE
Status: COMPLETED | OUTPATIENT
Start: 2024-08-07 | End: 2024-08-07

## 2024-08-07 RX ORDER — SODIUM CHLORIDE, SODIUM LACTATE, POTASSIUM CHLORIDE, CALCIUM CHLORIDE 600; 310; 30; 20 MG/100ML; MG/100ML; MG/100ML; MG/100ML
100 INJECTION, SOLUTION INTRAVENOUS CONTINUOUS
Status: DISCONTINUED | OUTPATIENT
Start: 2024-08-07 | End: 2024-08-08 | Stop reason: HOSPADM

## 2024-08-07 RX ORDER — SODIUM CHLORIDE, SODIUM LACTATE, POTASSIUM CHLORIDE, CALCIUM CHLORIDE 600; 310; 30; 20 MG/100ML; MG/100ML; MG/100ML; MG/100ML
100 INJECTION, SOLUTION INTRAVENOUS CONTINUOUS
Status: DISCONTINUED | OUTPATIENT
Start: 2024-08-07 | End: 2024-08-07

## 2024-08-07 RX ORDER — BUPIVACAINE HYDROCHLORIDE 2.5 MG/ML
INJECTION, SOLUTION EPIDURAL; INFILTRATION; INTRACAUDAL AS NEEDED
Status: DISCONTINUED | OUTPATIENT
Start: 2024-08-07 | End: 2024-08-07 | Stop reason: HOSPADM

## 2024-08-07 RX ORDER — SODIUM CHLORIDE, SODIUM LACTATE, POTASSIUM CHLORIDE, CALCIUM CHLORIDE 600; 310; 30; 20 MG/100ML; MG/100ML; MG/100ML; MG/100ML
125 INJECTION, SOLUTION INTRAVENOUS CONTINUOUS
Status: DISCONTINUED | OUTPATIENT
Start: 2024-08-07 | End: 2024-08-08 | Stop reason: HOSPADM

## 2024-08-07 RX ORDER — LIDOCAINE HYDROCHLORIDE 10 MG/ML
INJECTION, SOLUTION INFILTRATION; PERINEURAL AS NEEDED
Status: DISCONTINUED | OUTPATIENT
Start: 2024-08-07 | End: 2024-08-07 | Stop reason: HOSPADM

## 2024-08-07 RX ORDER — HYDROMORPHONE HYDROCHLORIDE 2 MG/ML
INJECTION, SOLUTION INTRAMUSCULAR; INTRAVENOUS; SUBCUTANEOUS AS NEEDED
Status: DISCONTINUED | OUTPATIENT
Start: 2024-08-07 | End: 2024-08-07 | Stop reason: SURG

## 2024-08-07 RX ORDER — PROCHLORPERAZINE EDISYLATE 5 MG/ML
10 INJECTION INTRAMUSCULAR; INTRAVENOUS EVERY 6 HOURS PRN
Status: DISCONTINUED | OUTPATIENT
Start: 2024-08-07 | End: 2024-08-08 | Stop reason: HOSPADM

## 2024-08-07 RX ORDER — SUCCINYLCHOLINE/SOD CL,ISO/PF 200MG/10ML
SYRINGE (ML) INTRAVENOUS AS NEEDED
Status: DISCONTINUED | OUTPATIENT
Start: 2024-08-07 | End: 2024-08-07 | Stop reason: SURG

## 2024-08-07 RX ORDER — KETAMINE HCL IN NACL, ISO-OSM 100MG/10ML
SYRINGE (ML) INJECTION AS NEEDED
Status: DISCONTINUED | OUTPATIENT
Start: 2024-08-07 | End: 2024-08-07 | Stop reason: SURG

## 2024-08-07 RX ORDER — SODIUM CHLORIDE, SODIUM LACTATE, POTASSIUM CHLORIDE, CALCIUM CHLORIDE 600; 310; 30; 20 MG/100ML; MG/100ML; MG/100ML; MG/100ML
50 INJECTION, SOLUTION INTRAVENOUS CONTINUOUS
Status: DISCONTINUED | OUTPATIENT
Start: 2024-08-07 | End: 2024-08-07

## 2024-08-07 RX ORDER — PANTOPRAZOLE SODIUM 40 MG/1
40 TABLET, DELAYED RELEASE ORAL DAILY
COMMUNITY
Start: 2024-07-29

## 2024-08-07 RX ORDER — PROPOFOL 10 MG/ML
INJECTION, EMULSION INTRAVENOUS AS NEEDED
Status: DISCONTINUED | OUTPATIENT
Start: 2024-08-07 | End: 2024-08-07 | Stop reason: SURG

## 2024-08-07 RX ORDER — ONDANSETRON 2 MG/ML
4 INJECTION INTRAMUSCULAR; INTRAVENOUS EVERY 6 HOURS PRN
Status: DISCONTINUED | OUTPATIENT
Start: 2024-08-07 | End: 2024-08-08 | Stop reason: HOSPADM

## 2024-08-07 RX ORDER — SUMATRIPTAN 25 MG/1
25 TABLET, FILM COATED ORAL ONCE AS NEEDED
COMMUNITY
Start: 2024-07-29

## 2024-08-07 RX ORDER — OXYCODONE HYDROCHLORIDE 5 MG/1
5 TABLET ORAL EVERY 4 HOURS PRN
Status: DISCONTINUED | OUTPATIENT
Start: 2024-08-07 | End: 2024-08-08 | Stop reason: HOSPADM

## 2024-08-07 RX ORDER — SODIUM CHLORIDE 0.9 % (FLUSH) 0.9 %
10 SYRINGE (ML) INJECTION AS NEEDED
Status: DISCONTINUED | OUTPATIENT
Start: 2024-08-07 | End: 2024-08-07 | Stop reason: HOSPADM

## 2024-08-07 RX ORDER — SODIUM CHLORIDE 9 MG/ML
40 INJECTION, SOLUTION INTRAVENOUS AS NEEDED
Status: DISCONTINUED | OUTPATIENT
Start: 2024-08-07 | End: 2024-08-07 | Stop reason: HOSPADM

## 2024-08-07 RX ORDER — ACETAMINOPHEN 500 MG
1000 TABLET ORAL EVERY 6 HOURS SCHEDULED
Status: DISCONTINUED | OUTPATIENT
Start: 2024-08-08 | End: 2024-08-08 | Stop reason: HOSPADM

## 2024-08-07 RX ORDER — SODIUM CHLORIDE 0.9 % (FLUSH) 0.9 %
10 SYRINGE (ML) INJECTION EVERY 12 HOURS SCHEDULED
Status: DISCONTINUED | OUTPATIENT
Start: 2024-08-07 | End: 2024-08-07 | Stop reason: HOSPADM

## 2024-08-07 RX ORDER — DEXAMETHASONE SODIUM PHOSPHATE 4 MG/ML
INJECTION, SOLUTION INTRA-ARTICULAR; INTRALESIONAL; INTRAMUSCULAR; INTRAVENOUS; SOFT TISSUE AS NEEDED
Status: DISCONTINUED | OUTPATIENT
Start: 2024-08-07 | End: 2024-08-07 | Stop reason: SURG

## 2024-08-07 RX ORDER — ONDANSETRON 2 MG/ML
INJECTION INTRAMUSCULAR; INTRAVENOUS AS NEEDED
Status: DISCONTINUED | OUTPATIENT
Start: 2024-08-07 | End: 2024-08-07 | Stop reason: SURG

## 2024-08-07 RX ORDER — LIDOCAINE HCL/PF 100 MG/5ML
SYRINGE (ML) INJECTION AS NEEDED
Status: DISCONTINUED | OUTPATIENT
Start: 2024-08-07 | End: 2024-08-07 | Stop reason: SURG

## 2024-08-07 RX ORDER — INDOCYANINE GREEN AND WATER 25 MG
5 KIT INJECTION ONCE
Status: COMPLETED | OUTPATIENT
Start: 2024-08-07 | End: 2024-08-07

## 2024-08-07 RX ADMIN — ONDANSETRON 4 MG: 2 INJECTION INTRAMUSCULAR; INTRAVENOUS at 17:21

## 2024-08-07 RX ADMIN — SODIUM CHLORIDE, POTASSIUM CHLORIDE, SODIUM LACTATE AND CALCIUM CHLORIDE 125 ML/HR: 600; 310; 30; 20 INJECTION, SOLUTION INTRAVENOUS at 08:52

## 2024-08-07 RX ADMIN — PROCHLORPERAZINE EDISYLATE 10 MG: 5 INJECTION INTRAMUSCULAR; INTRAVENOUS at 11:30

## 2024-08-07 RX ADMIN — LIDOCAINE HYDROCHLORIDE 60 MG: 20 INJECTION INTRAVENOUS at 17:15

## 2024-08-07 RX ADMIN — HYDROMORPHONE HYDROCHLORIDE 1 MG: 1 INJECTION, SOLUTION INTRAMUSCULAR; INTRAVENOUS; SUBCUTANEOUS at 08:52

## 2024-08-07 RX ADMIN — INDOCYANINE GREEN AND WATER 5 MG: KIT at 16:59

## 2024-08-07 RX ADMIN — HYDROMORPHONE HYDROCHLORIDE 1 MG: 1 INJECTION, SOLUTION INTRAMUSCULAR; INTRAVENOUS; SUBCUTANEOUS at 05:02

## 2024-08-07 RX ADMIN — SODIUM CHLORIDE 1000 ML: 9 INJECTION, SOLUTION INTRAVENOUS at 04:59

## 2024-08-07 RX ADMIN — ONDANSETRON 4 MG: 2 INJECTION INTRAMUSCULAR; INTRAVENOUS at 00:40

## 2024-08-07 RX ADMIN — HYDROMORPHONE HYDROCHLORIDE 1 MG: 2 INJECTION, SOLUTION INTRAMUSCULAR; INTRAVENOUS; SUBCUTANEOUS at 17:15

## 2024-08-07 RX ADMIN — DEXAMETHASONE SODIUM PHOSPHATE 4 MG: 4 INJECTION, SOLUTION INTRA-ARTICULAR; INTRALESIONAL; INTRAMUSCULAR; INTRAVENOUS; SOFT TISSUE at 17:21

## 2024-08-07 RX ADMIN — Medication 140 MG: at 17:16

## 2024-08-07 RX ADMIN — ONDANSETRON 4 MG: 2 INJECTION INTRAMUSCULAR; INTRAVENOUS at 08:52

## 2024-08-07 RX ADMIN — SODIUM CHLORIDE, POTASSIUM CHLORIDE, SODIUM LACTATE AND CALCIUM CHLORIDE 50 ML/HR: 600; 310; 30; 20 INJECTION, SOLUTION INTRAVENOUS at 16:18

## 2024-08-07 RX ADMIN — OXYCODONE HYDROCHLORIDE 5 MG: 5 TABLET ORAL at 22:03

## 2024-08-07 RX ADMIN — PROPOFOL 180 MG: 10 INJECTION, EMULSION INTRAVENOUS at 17:15

## 2024-08-07 RX ADMIN — HYDROMORPHONE HYDROCHLORIDE 1 MG: 2 INJECTION, SOLUTION INTRAMUSCULAR; INTRAVENOUS; SUBCUTANEOUS at 17:47

## 2024-08-07 RX ADMIN — SODIUM CHLORIDE 2000 MG: 9 INJECTION, SOLUTION INTRAVENOUS at 17:11

## 2024-08-07 RX ADMIN — Medication 25 MG: at 17:45

## 2024-08-07 RX ADMIN — IOPAMIDOL 100 ML: 612 INJECTION, SOLUTION INTRAVENOUS at 01:55

## 2024-08-07 RX ADMIN — SODIUM CHLORIDE, POTASSIUM CHLORIDE, SODIUM LACTATE AND CALCIUM CHLORIDE 100 ML/HR: 600; 310; 30; 20 INJECTION, SOLUTION INTRAVENOUS at 20:38

## 2024-08-07 RX ADMIN — ONDANSETRON 4 MG: 2 INJECTION INTRAMUSCULAR; INTRAVENOUS at 02:25

## 2024-08-07 RX ADMIN — MORPHINE SULFATE 4 MG: 4 INJECTION, SOLUTION INTRAMUSCULAR; INTRAVENOUS at 00:42

## 2024-08-07 RX ADMIN — ONDANSETRON 4 MG: 2 INJECTION INTRAMUSCULAR; INTRAVENOUS at 05:00

## 2024-08-07 RX ADMIN — Medication 25 MG: at 17:15

## 2024-08-07 RX ADMIN — MORPHINE SULFATE 4 MG: 4 INJECTION, SOLUTION INTRAMUSCULAR; INTRAVENOUS at 02:27

## 2024-08-07 NOTE — CASE MANAGEMENT/SOCIAL WORK
Discharge Planning Assessment  [facility]     Patient Name: Lorena Keen  MRN: [id number]  Today's Date: 8/7/2024    Admit Date: 8/7/2024    Plan: Pt provided demographics and  dcp information at bedside. She does not have an AD, POA, home DME, or finanical stressors. Her primary is [facility], Dr. Corona and [facility] is her pharmacy, agreeable to meds to beds. Pt and her family have been at current address 3.5 years. 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 She drives some, mostly her mother and significant other provide transportation. She plans on returning home and has no dc needs voiced.   Discharge Needs Assessment       Row Name 08/07/24 1418       Living Environment    People in Home child(gabi), dependent;parent(s);significant other;sibling(s)    Name(s) of People in Home Pt's 2 children, significant other, mother, and sister    Current Living Arrangements home    Duration at Residence 3.5 yrs    Potentially Unsafe Housing Conditions none    In the past 12 months has the electric, gas, oil, or water company threatened to shut off services in your home? No    Primary Care Provided by self    Provides Primary Care For child(gabi)    Family Caregiver if Needed significant other;sibling(s);other (see comments)  FLASH Castanon    Quality of Family Relationships helpful;involved    Able to Return to Prior Arrangements yes       Resource/Environmental Concerns    Resource/Environmental Concerns none    Transportation Concerns none       Transportation Needs    In the past 12 months, has lack of transportation kept you from medical appointments or from getting medications? no    In the past 12 months, has lack of transportation kept you from meetings, work, or from getting things needed for daily living? No       Food Insecurity    Within the past 12 months, you worried that your food would run out before you got the money to buy more. Never true    Within the past 12 months, the food you bought just  didn't last and you didn't have money to get more. Never true       Transition Planning    Patient/Family Anticipates Transition to home with family    Patient/Family Anticipated Services at Transition none    Transportation Anticipated family or friend will provide       Discharge Needs Assessment    Readmission Within the Last 30 Days no previous admission in last 30 days    Concerns to be Addressed no discharge needs identified;denies needs/concerns at this time    Anticipated Changes Related to Illness none    Equipment Needed After Discharge none                   Discharge Plan       Row Name 08/07/24 1421       Plan    Plan Pt provided demographics and  dcp information at bedside. She does not have an AD, POA, home DME, or finanical stressors. Her primary is [facility], Dr. Corona and [facility] is her pharmacy, agreeable to meds to beds. Pt and her family have been at current address 3.5 years. 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 She drives some, mostly her mother and significant other provide transportation. She plans on returning home and has no dc needs voiced.                  Continued Care and Services - Admitted Since 8/7/2024    No active coordination exists for this encounter.          Demographic Summary       Row Name 08/07/24 1416       General Information    Admission Type observation    Arrived From emergency department    Referral Source admission list    Reason for Consult discharge planning    Preferred Language English       Contact Information    Permission Granted to Share Info With family/designee                   Functional Status       Row Name 08/07/24 1417       Functional Status    Usual Activity Tolerance good    Current Activity Tolerance good       Physical Activity    On average, how many days per week do you engage in moderate to strenuous exercise (like a brisk walk)? 0 days    On average, how many minutes do you engage in exercise at this level? 0 min    Number of  minutes of exercise per week 0       Functional Status, IADL    Medications independent    Meal Preparation independent    Housekeeping independent    Laundry independent    Shopping independent    IADL Comments pt drives some, family mostly provides transportation       Mental Status    General Appearance WDL WDL       Mental Status Summary    Recent Changes in Mental Status/Cognitive Functioning no changes       Employment/    Employment Status unemployed                   Psychosocial    No documentation.                  Abuse/Neglect    No documentation.                  Legal    No documentation.                  Substance Abuse    No documentation.                  Patient Forms    No documentation.                     January Salguero RN

## 2024-08-07 NOTE — OP NOTE
PROCEDURE DATE: [01]    SURGEON: Kristen Bear MD, FACS    PREOPERATIVE DIAGNOSIS: [02]    POSTOPERATIVE DIAGNOSIS: [03]    PROCEDURE: Laparoscopic cholecystectomy    ANESTHESIA: general    EBL: [04]    IMPLANTS:    SPECIMENS:     INDICATIONS FOR THE PROCEDURE:  [05]    DESCRIPTION OF THE PROCEDURE:  The patient was seen and examined in the preoperative holding area on the day of surgery and there are no changes to the medical history.  The patient was then taken to the operating room and placed supine on the operating table where a timeout is performed using the WHO checklist.  The patient received appropriate preoperative antibiotics as well as subcutaneous heparin for DVT prophylaxis.    Following the satisfactory induction of general anesthesia the patient's abdomen was prepped and draped in the usual sterile fashion.  A vertical incision was made just above the umbilicus and was carried through the soft tissue to the level of the fascia.  The fascia was grasped and elevated between Kocher clamps and incised sharply with a knife.  Entry was confirmed into the peritoneum visually and there was no bowel visible in the vicinity of this incision.  Two stay sutures of 0 Vicryl were placed in either side of the fascia and a Greenberg cannula was inserted under direct visualization.  The sutures were used to secure the cannula.    The abdomen was insufflated with carbon dioxide to a pressure of 15 mmHg and the laparoscope was introduced.  The abdomen was inspected and no injuries were noted from initial trocar placement.  Three additional 5 mm ports were then placed in the subxiphoid, right subcostal, and right upper quadrant positions under direct visualization.  The patient was then placed into the reverse Trendelenburg position with the left side down.    Filmy adhesions between the gallbladder and omentum were freed using blunt dissection.  The dome of the gallbladder was then grasped and retracted cephalad up  over the dome of the liver.  The infundibulum of the gallbladder was grasped and retracted laterally in order to splay out the triangle of Calot.  The peritoneum overlying the gallbladder infundibulum was incised with Bovie electrocautery and the cystic duct and cystic artery were identified and circumferentially skeletonized.  The cystic duct and cystic artery were completely circumferentially dissected until a critical view of safety was obtained and once this was done they were doubly clipped and divided close to the gallbladder.    The gallbladder was then dissected free from its peritoneal attachments to the liver using Bovie electrocautery.  Hemostasis was ensured using electrocautery.  Once the gallbladder was completed freed from the undersurface of liver was placed into an Endo Catch bag.  The gallbladder fossa was then reinspected and copiously irrigated with saline and hemostasis was ensured.  The cystic duct stump and cystic artery stump were reinspected and noted to be secure.  Following this the upper abdominal trocars were removed under direct visualization and no bleeding was noted.  The laparoscope was withdrawn and the abdomen was desufflated.  The Greenberg cannula was removed out of the umbilical port site followed by the gallbladder which was completely contained within the Endo Catch bag.  This was passed off the field as specimen.  The fascia of the umbilical port site was then closed using a 0 Vicryl suture placed in a figure-of-eight fashion ×2.  The skin of all incisions was closed using a 5-0 PDS suture placed in a subcuticular fashion.  0.25% Marcaine was injected into the wounds for postoperative analgesia.  Mastisol and steri strips were applied to the wounds and covered with dry sterile dressings.    There were no immediate complications noted and the procedure was well tolerated by the patient.  All sponge, needle,  and instrument  counts were correct at the conclusion of procedure.    Chema was present scrubbed for the procedure and its entirety.  The patient was awakened from anesthesia and taken to the recovery room in good condition.                The patient was seen and examined in the preoperative holding area on the day of surgery and there are no changes to the medical history.  The patient was then taken to the operating room and placed supine on the operating table where a timeout is performed using the WHO checklist.  The patient received appropriate preoperative antibiotics as well as subcutaneous heparin for DVT prophylaxis.     Following the satisfactory induction of general anesthesia the patient's abdomen was prepped and draped in the usual sterile fashion.  A vertical incision was made just above the umbilicus and was carried through the soft tissue to the level of the fascia.  The fascia was grasped and elevated between Kocher clamps and incised sharply with a knife.  Entry was confirmed into the peritoneum visually and there was no bowel visible in the vicinity of this incision.  Two stay sutures of 0 Vicryl were placed in either side of the fascia and a Greenberg cannula was inserted under direct visualization.  The sutures were used to secure the cannula.     The abdomen was insufflated with carbon dioxide to a pressure of 15 mmHg and the laparoscope was introduced.  The abdomen was inspected and no injuries were noted from initial trocar placement.  Three additional 8 mm ports were then placed under direct visualization.  The patient was then placed into the reverse Trendelenburg position with the left side down.     The da Natalie robot was then brought into the field and docked to the patient.  Intraperitoneal visualization was reestablished, the gallbladder was identified and targeted in the right upper quadrant.  A ProGrasp forcep was used to retract the dome of the gallbladder cephalad up over the dome of the liver.  Filmy adhesions between the gallbladder and the omentum  were freed using electrocautery.  The infundibulum of the gallbladder was grasped with a cadiere grasper and retracted laterally to splay out the triangle of Calot.  The peritoneum overlying the gallbladder infundibulum was incised with electrocautery and the cystic duct were identified and circumferentially skeletonized.  The cystic duct and cystic artery were completely and circumferentially dissected until a critical view of safety was obtained.  Firefly immunofluorescence was used to confirm identification of the appropriate ductal structures.  Once this was done, the cystic duct and cystic artery were doubly clipped and divided close to the gallbladder.     The gallbladder was then dissected free from its peritoneal attachments to the liver using Bovie electrocautery.  Hemostasis was ensured using electrocautery.  Once the gallbladder was completed freed from the undersurface of liver was placed into an Endo Catch bag.  The gallbladder fossa was then reinspected and copiously irrigated with saline and hemostasis was ensured.  The cystic duct stump and cystic artery stump were reinspected and noted to be secure.  Following this the upper abdominal trocars were removed under direct visualization and no bleeding was noted.  The laparoscope was withdrawn and the abdomen was desufflated.  The Greenberg cannula was removed out of the umbilical port site followed by the gallbladder which was completely contained within the Endo Catch bag.  This was passed off the field as specimen.  The fascia of the umbilical port site was then closed using a 0 Vicryl suture placed in a figure-of-eight fashion ×2.  The skin of all incisions was closed using a 4-0 Vicryl suture placed in a subcuticular fashion.  0.25% Marcaine was injected into the wounds for postoperative analgesia.  Mastisol and steri strips were applied to the wounds and covered with dry sterile dressings.     There were no immediate complications noted and the  procedure was well tolerated by the patient.  All sponge, needle,  and instrument  counts were correct at the conclusion of procedure.  Dr. Bear was present scrubbed for the procedure and its entirety.  The patient was awakened from anesthesia and taken to the recovery room in good condition.      Kristen Bear MD

## 2024-08-07 NOTE — ED PROVIDER NOTES
EMERGENCY DEPARTMENT ENCOUNTER    Pt Name: Lorena Keen  MRN: [id number]  Pt :   1990  Room Number:  [facility] OR/MAIN OR  Date of encounter:  2024  PCP: Provider, No Known  ED Provider: Art Urban PA-C    Historian: Patient      HPI:  Chief Complaint   Patient presents with    Abdominal Pain          Context: Lorena Keen is a 34 y.o. female who presents to the ED c/o nausea and epigastric pain.  Patient complains of the symptoms onset around 10 or 11 this evening.  About an hour prior ate a veggie burger and jalapeno poppers.  Has had several episodes over the past several months.  Has had loose stool today.  Saw PCP about this and had an ultrasound ordered of her gallbladder but has not slated to be done until September.  Has not had a cholecystectomy.  Is on Protonix for GERD.  No chest pain.  No shortness of breath but states a deep breath does aggravate the pain.      PAST MEDICAL HISTORY  Past Medical History:   Diagnosis Date    Anal fissure     Carpal tunnel syndrome 2016    Depression 2016    Gastroesophageal reflux disease 2016    Generalized anxiety disorder 2016    Irregular uterine bleeding     Metorrhagia.Pt states she had been spotting for approx 2 weeks, but this has stopped. PT usually has cycle every 3 months.     Irritable bowel syndrome 2016    Migraine 2016    Ovarian cyst     PMS (premenstrual syndrome)     Urinary tract infection     Varicella          PAST SURGICAL HISTORY  Past Surgical History:   Procedure Laterality Date     SECTION N/A 10/24/2022    Procedure:  SECTION PRIMARY;  Surgeon: Brenden Swan MD;  Location: [facility] OR;  Service: Obstetrics/Gynecology;  Laterality: N/A;     SECTION N/A 5/10/2024    Procedure:  SECTION REPEAT;  Surgeon: Brenden Swan MD;  Location: [facility] OR;  Service: Obstetrics/Gynecology;  Laterality: N/A;         FAMILY HISTORY  Family  History   Problem Relation Age of Onset    Hypertension Mother     Hypertension Father     Diabetes Father     Cancer Other          SOCIAL HISTORY  Social History     Socioeconomic History    Marital status: Single   Tobacco Use    Smoking status: Every Day     Current packs/day: 0.25     Types: Cigarettes    Smokeless tobacco: Never    Tobacco comments:     1/27/2016: Former Smoker   Vaping Use    Vaping status: Never Used   Substance and Sexual Activity    Alcohol use: Yes    Drug use: Not Currently     Types: Marijuana    Sexual activity: Yes     Partners: Male     Birth control/protection: Condom         ALLERGIES  Fluoxetine, Sulfa antibiotics, and Trazodone        REVIEW OF SYSTEMS  Review of Systems   Constitutional: Negative.    HENT: Negative.     Eyes: Negative.    Respiratory: Negative.     Cardiovascular: Negative.    Gastrointestinal: Negative.  Positive for abdominal pain and nausea.   Genitourinary: Negative.    Musculoskeletal: Negative.    Skin: Negative.    Neurological: Negative.    Psychiatric/Behavioral: Negative.          All systems reviewed and negative except for those discussed in HPI.       PHYSICAL EXAM    I have reviewed the triage vital signs and nursing notes.    ED Triage Vitals [08/07/24 0013]   Temp Heart Rate Resp BP SpO2   98.3 °F (36.8 °C) 67 18 139/89 99 %      Temp src Heart Rate Source Patient Position BP Location FiO2 (%)   Oral Monitor Sitting Left arm --       Physical Exam  Vitals and nursing note reviewed.   Constitutional:       General: She is not in acute distress.     Appearance: She is well-developed. She is not ill-appearing, toxic-appearing or diaphoretic.   HENT:      Head: Normocephalic and atraumatic.   Eyes:      Extraocular Movements: Extraocular movements intact.   Cardiovascular:      Rate and Rhythm: Normal rate and regular rhythm.      Heart sounds: Normal heart sounds.   Pulmonary:      Effort: Pulmonary effort is normal.      Breath sounds: Normal  breath sounds.   Abdominal:      General: Abdomen is flat. Bowel sounds are decreased.      Palpations: Abdomen is soft.      Tenderness: There is abdominal tenderness in the right upper quadrant and epigastric area. There is no guarding or rebound.   Skin:     General: Skin is warm and dry.   Neurological:      General: No focal deficit present.      Mental Status: She is alert.   Psychiatric:         Mood and Affect: Mood normal.         Behavior: Behavior normal.            LAB RESULTS  Recent Results (from the past 24 hour(s))   Comprehensive Metabolic Panel    Collection Time: 08/07/24 12:33 AM    Specimen: Blood   Result Value Ref Range    Glucose 119 (H) 65 - 99 mg/dL    BUN 11 6 - 20 mg/dL    Creatinine 0.91 0.57 - 1.00 mg/dL    Sodium 138 136 - 145 mmol/L    Potassium 4.2 3.5 - 5.2 mmol/L    Chloride 103 98 - 107 mmol/L    CO2 25.7 22.0 - 29.0 mmol/L    Calcium 9.4 8.6 - 10.5 mg/dL    Total Protein 7.4 6.0 - 8.5 g/dL    Albumin 4.0 3.5 - 5.2 g/dL    ALT (SGPT) 71 (H) 1 - 33 U/L    AST (SGOT) 38 (H) 1 - 32 U/L    Alkaline Phosphatase 108 39 - 117 U/L    Total Bilirubin 0.2 0.0 - 1.2 mg/dL    Globulin 3.4 gm/dL    A/G Ratio 1.2 g/dL    BUN/Creatinine Ratio 12.1 7.0 - 25.0    Anion Gap 9.3 5.0 - 15.0 mmol/L    eGFR 86.1 >60.0 mL/min/1.73   Lipase    Collection Time: 08/07/24 12:33 AM    Specimen: Blood   Result Value Ref Range    Lipase 28 13 - 60 U/L   Single High Sensitivity Troponin T    Collection Time: 08/07/24 12:33 AM    Specimen: Blood   Result Value Ref Range    HS Troponin T <6 <14 ng/L   Green Top (Gel)    Collection Time: 08/07/24 12:33 AM   Result Value Ref Range    Extra Tube Hold for add-ons.    Lavender Top    Collection Time: 08/07/24 12:33 AM   Result Value Ref Range    Extra Tube hold for add-on    Gold Top - SST    Collection Time: 08/07/24 12:33 AM   Result Value Ref Range    Extra Tube Hold for add-ons.    Light Blue Top    Collection Time: 08/07/24 12:33 AM   Result Value Ref Range     Extra Tube Hold for add-ons.    CBC Auto Differential    Collection Time: 08/07/24 12:33 AM    Specimen: Blood   Result Value Ref Range    WBC 5.73 3.40 - 10.80 10*3/mm3    RBC 4.77 3.77 - 5.28 10*6/mm3    Hemoglobin 13.7 12.0 - 15.9 g/dL    Hematocrit 41.6 34.0 - 46.6 %    MCV 87.2 79.0 - 97.0 fL    MCH 28.7 26.6 - 33.0 pg    MCHC 32.9 31.5 - 35.7 g/dL    RDW 14.2 12.3 - 15.4 %    RDW-SD 45.7 37.0 - 54.0 fl    MPV 8.6 6.0 - 12.0 fL    Platelets 328 140 - 450 10*3/mm3    Neutrophil % 60.1 42.7 - 76.0 %    Lymphocyte % 31.2 19.6 - 45.3 %    Monocyte % 6.5 5.0 - 12.0 %    Eosinophil % 1.6 0.3 - 6.2 %    Basophil % 0.3 0.0 - 1.5 %    Immature Grans % 0.3 0.0 - 0.5 %    Neutrophils, Absolute 3.44 1.70 - 7.00 10*3/mm3    Lymphocytes, Absolute 1.79 0.70 - 3.10 10*3/mm3    Monocytes, Absolute 0.37 0.10 - 0.90 10*3/mm3    Eosinophils, Absolute 0.09 0.00 - 0.40 10*3/mm3    Basophils, Absolute 0.02 0.00 - 0.20 10*3/mm3    Immature Grans, Absolute 0.02 0.00 - 0.05 10*3/mm3    nRBC 0.0 0.0 - 0.2 /100 WBC   Urinalysis With Microscopic If Indicated (No Culture) - Urine, Clean Catch    Collection Time: 08/07/24  1:20 AM    Specimen: Urine, Clean Catch   Result Value Ref Range    Color, UA Red (A) Yellow, Straw    Appearance, UA Turbid (A) Clear    pH, UA 5.5 5.0 - 8.0    Specific Gravity, UA >=1.030 1.005 - 1.030    Glucose, UA Negative Negative    Ketones, UA Trace (A) Negative    Bilirubin, UA Small (1+) (A) Negative    Blood, UA Large (3+) (A) Negative    Protein, UA >=300 mg/dL (3+) (A) Negative    Leuk Esterase, UA Small (1+) (A) Negative    Nitrite, UA Negative Negative    Urobilinogen, UA 1.0 E.U./dL 0.2 - 1.0 E.U./dL   Pregnancy, Urine - Urine, Clean Catch    Collection Time: 08/07/24  1:20 AM    Specimen: Urine, Clean Catch   Result Value Ref Range    HCG, Urine QL Negative Negative   Urinalysis, Microscopic Only - Urine, Clean Catch    Collection Time: 08/07/24  1:20 AM    Specimen: Urine, Clean Catch   Result Value Ref  Range    RBC, UA 11-20 (A) None Seen, 0-2 /HPF    WBC, UA 3-5 (A) None Seen, 0-2 /HPF    Bacteria, UA 2+ (A) None Seen /HPF    Squamous Epithelial Cells, UA 3-6 (A) None Seen, 0-2 /HPF    Hyaline Casts, UA None Seen None Seen /LPF    Mucus, UA Moderate/2+ (A) None Seen, Trace /HPF    Methodology Manual Light Microscopy        If labs were ordered, I independently reviewed the results and considered them in treating the patient.        RADIOLOGY  US Gallbladder    Result Date: 8/7/2024  FINAL REPORT TECHNIQUE: null CLINICAL HISTORY: RUQ abd pain, noncalcified gallstones on CT COMPARISON: null FINDINGS: ULTRASOUND ABDOMEN LIMITED COMPARISON: CT abdomen/pelvis 08/07/2024. FINDINGS: Cholelithiasis is noted. A stone is noted in the vicinity of the gallbladder neck. No gallbladder wall thickening or pericholecystic fluid. Common bile duct measures 4 mm in caliber. A hyperechoic liver lesion is seen on images 11-13, measuring 1.7 x 2.4 x 1.7 cm. The appearance suggests a hemangioma. Pancreas is not well visualized due to overlying bowel gas. Right kidney is unremarkable without stone or hydronephrosis.     IMPRESSION: 1. Cholelithiasis is noted, including a stone in the vicinity of the gallbladder neck. No sonographic evidence of acute cholecystitis. No biliary ductal dilatation. 2. A 1.7 x 2.4 x 1.7 cm hemangioma is noted within the liver. Authenticated and Electronically Signed by Zafar Denny MD on 08/07/2024 04:29:39 AM    CT Abdomen Pelvis With Contrast    Result Date: 8/7/2024  FINAL REPORT TECHNIQUE: null CLINICAL HISTORY: Epigastric and right upper quadrant abdominal pain. COMPARISON: null FINDINGS: Exam: CT abdomen and pelvis with contrast Comparison: None Clinical history: Epigastric and right upper quadrant abdominal pain Findings: Lung bases are clear. Small hypodense lesions in the liver seen on axial image 26 may represent small hemangiomas. The spleen and pancreas do not demonstrate any acute process.  Suspected noncalcified gallstones. No gallbladder wall thickening or evidence for acute cholecystitis. No choledocholithiasis or biliary obstruction. Normal adrenal glands. No renal calculi or hydronephrosis. Symmetric enhancement of the kidneys bilaterally No small bowel obstruction No abdominal aortic aneurysm. Appendix is normal in caliber No colitis or diverticulitis. Urinary bladder is decompressed without stones. Uterus and adnexa have a normal appearance for CT No free fluid     IMPRESSION: 1. Suspected noncalcified gallstones. No gallbladder wall thickening or evidence for acute cholecystitis. Ultrasound can be performed for further evaluation. Authenticated and Electronically Signed by Aury Lassiter MD on 08/07/2024 02:38:56 AM         PROCEDURES    Procedures    Interpretations    O2 Sat: The patients oxygen saturation was 99% on Room Air.  This was independently interpreted by me as Normal    EKG: I reviewed and independently interpreted the EKG as normal sinus rhythm at a rate of 67 bpm.  No ST segment elevation.    Cardiac Monitoring: I reviewed and independently interpreted the Rhythm Strip as Normal Sinus rhythm rate of 68 bpm    Radiology: I ordered and independently reviewed the above noted radiographic studies.  I viewed images of CT Abdomen/Pelvis which showed  cholelithiasis  per my independent interpretation. See radiologist's dictation for official interpretation.       MEDICATIONS GIVEN IN ER    Medications   sodium chloride 0.9 % flush 10 mL ( Intravenous MAR Hold 8/7/24 1554)   HYDROmorphone (DILAUDID) injection 1 mg ( Intravenous MAR Hold 8/7/24 1554)   ondansetron (ZOFRAN) injection 4 mg ( Intravenous MAR Hold 8/7/24 1554)   lactated ringers infusion (125 mL/hr Intravenous Rate/Dose Change 8/7/24 1537)   lactated ringers infusion (100 mL/hr Intravenous Rate/Dose Change 8/7/24 1437)   heparin (porcine) 5000 UNIT/ML injection 5,000 Units ( Subcutaneous Dose Auto Held 8/16/24 2200)    prochlorperazine (COMPAZINE) injection 10 mg ( Intravenous MAR Hold 8/7/24 1554)   sodium chloride 0.9 % flush 10 mL (has no administration in time range)   sodium chloride 0.9 % flush 10 mL (has no administration in time range)   sodium chloride 0.9 % infusion 40 mL (has no administration in time range)   lactated ringers infusion ( Intravenous Restarted 8/7/24 1720)   bupivacaine (PF) (MARCAINE) 0.25 % injection (30 mL Infiltration Given 8/7/24 1739)   lidocaine (XYLOCAINE) 1 % injection (20 mL Infiltration Given 8/7/24 1739)   morphine injection 4 mg (4 mg Intravenous Given 8/7/24 0042)   ondansetron (ZOFRAN) injection 4 mg (4 mg Intravenous Given 8/7/24 0040)   iopamidol (ISOVUE-300) 61 % injection 100 mL (100 mL Intravenous Given 8/7/24 0155)   morphine injection 4 mg (4 mg Intravenous Given 8/7/24 0227)   ondansetron (ZOFRAN) injection 4 mg (4 mg Intravenous Given 8/7/24 0225)   HYDROmorphone (DILAUDID) injection 1 mg (1 mg Intravenous Given 8/7/24 0502)   sodium chloride 0.9 % bolus 1,000 mL (0 mL Intravenous Stopped 8/7/24 1320)   ondansetron (ZOFRAN) injection 4 mg (4 mg Intravenous Given 8/7/24 0500)   ceFAZolin 2000 mg IVPB in 100 mL NS (VTB) (2,000 mg Intravenous New Bag 8/7/24 1711)   indocyanine green (IC-GREEN) injection 5 mg (5 mg Intravenous Given 8/7/24 1659)         MEDICAL DECISION MAKING, PROGRESS, and CONSULTS    All labs, if obtained, have been independently reviewed by me.  All radiology studies, if obtained, have been reviewed by me and the radiologist dictating the report.  All EKG's, if obtained, have been independently viewed and interpreted by me      Discussion below represents my analysis of pertinent findings related to patient's condition, differential diagnosis, treatment plan and final disposition.      Differential diagnosis:    Acute cholecystitis, symptomatic cholelithiasis, pancreatitis, gastritis, gastric ulcer, bowel obstruction.    Additional Sources:  None      Orders  placed during this visit:  Orders Placed This Encounter   Procedures    CT Abdomen Pelvis With Contrast    US Gallbladder    [place] Draw    Comprehensive Metabolic Panel    Lipase    Single High Sensitivity Troponin T    Urinalysis With Microscopic If Indicated (No Culture) - Urine, Clean Catch    CBC Auto Differential    Pregnancy, Urine - Urine, Clean Catch    Urinalysis, Microscopic Only - Urine, Clean Catch    NPO Diet NPO Type: Strict NPO    Undress & Gown    Vital Signs    Vital Signs    Opioid Administration - Document EtCO2 and / or SpO2 With Each Set of Vitals & Any Change in Patient Status    Opioid Administration - Notify Provider Hypercapnic Monitoring    Opioid Administration - Continuous Pulse Oximetry (SpO2)    Strict Intake and Output    Place sequential compression device    Notify Provider    Turn, Cough & Deep Breathe    Ambulate Patient    Up in Chair    Activity - Ad Kat    Vital Signs    Turn Cough & Deep Breathe    Antibiotic Previously Ordered    Verify NPO Status    Clip Operative Site    Verify / Perform Chlorhexidine Skin Prep    Perform Chlorhexidine Skin Prep Night Before and Morning of Procedure    Saline Lock & Maintain IV Access    Place Sequential Compression Device    Maintain Sequential Compression Device    Obtain Informed Consent    Place Sequential Compression Device    Maintain Sequential Compression Device    Code Status and Medical Interventions: CPR (Attempt to Resuscitate); Full Support    Surgery (on-call MD unless specified)    Oxygen Therapy- Nasal Cannula; Titrate 1-6 LPM Per SpO2; 90 - 95%    Incentive Spirometry    ECG 12 Lead ED Triage Standing Order; Abdominal Pain (Upper)    Insert Peripheral IV    Insert Peripheral IV    Initiate Observation Status    CBC & Differential    Green Top (Gel)    Lavender Top    Gold Top - SST    Light Blue Top         Additional orders considered but not ordered:  None    ED Course:    Consultants:   General Surgery, Dr. Bear    ED  Course as of 08/07/24 1753   Wed Aug 07, 2024   0041 WBC: 5.73 [TM]   0041 Hemoglobin: 13.7 [TM]   0041 Hematocrit: 41.6 [TM]   0058 ALT (SGPT)(!): 71 [TM]   0058 AST (SGOT)(!): 38 [TM]   0058 Glucose(!): 119 [TM]   0058 BUN: 11 [TM]   0058 Creatinine: 0.91 [TM]   0058 Total Bilirubin: 0.2 [TM]   0059 Lipase: 28 [TM]   0059 HS Troponin T: <6 [TM]   0147 HCG, Urine QL: Negative [TM]   0308 Discussed with patient findings on CT scan showing noncalcified gallstones.  She still in a decent amount of pain.  This is despite 8 mg of morphine and 8 mg of Zofran.  Did offer admission for symptomatic cholelithiasis but she wishes to perform an ultrasound first try to control her pain better and if possible send her home the night however is agreeable to stay if the ultrasound shows anything concerning or her pain worsens or does not improve. [TM]   0314 Dr. Vang spoke with Dr. Bear, will continue to attempt to control the patient's pain better, await ultrasound results and patient will plan at that time to either make a decision to be admitted for pain control or cholecystectomy ordered discharge home for outpatient follow-up. [TM]   0441 Patient care taken over by myself at shift change pending final results of final disposition.  Ultrasound shows evidence of cholelithiasis but no evidence of acute cholecystitis or significant biliary ductal dilatation.  Patient still very symptomatic on repeat assessment requiring an additional dose of IV Dilaudid for pain control.  Patient to be held continued n.p.o. for possible surgical intervention this morning. [FL]   0618 Patient admitted by general surgery for further treatment. [FL]      ED Course User Index  [FL] Saravanan Vang MD  [TM] Art Urban PA-C           After my consideration of clinical presentation and any laboratory/radiology studies obtained, I discussed the findings with the patient/patient representative who is in agreement with the treatment  plan and the final disposition. Risks and benefits of admission was discussed     AS OF 17:53 EDT VITALS:    BP - 134/82  HR - 61  TEMP - 98.3 °F (36.8 °C) (Oral)  O2 SATS - 97%    I reviewed the patients prescription monitoring report if available prior to discharge    DIAGNOSIS  Final diagnoses:   Symptomatic cholelithiasis   Biliary colic   Intractable abdominal pain         DISPOSITION  ED Disposition       ED Disposition   Decision to Admit    Condition   --    Comment   Level of Care: Med/Surg [1]   Diagnosis: Symptomatic cholelithiasis [579416]   Admitting Physician: LAURA ALLRED [[id number]]   Attending Physician: LAURA ALLRED [[id number]]                     Please note that portions of this document were completed with voice recognition software.        Art Urban PA-C  08/07/24 2237

## 2024-08-07 NOTE — PAYOR COMM NOTE
"TO:[facility]  FROM:JONNIE CLARKE, RN PHONE [phone]  OBSERVATION NOTIFICATION  TAX ID [id number] NPI [id number]    Virgil Keen (34 y.o. Female)       Date of Birth   1990    Social Security Number       Address   [address] [place] [address]    Home Phone   [phone]    MRN   [id number]       Yazidism   None    Marital Status   Single                            Admission Date   8/7/24    Admission Type   Emergency    Admitting Provider   Kristen Bear MD    Attending Provider   Kristen Bear MD    Department, Room/Bed   [facility] TELEMETRY 3, 308/1       Discharge Date       Discharge Disposition       Discharge Destination                                 Attending Provider: Kristen Bear MD    Allergies: Fluoxetine, Sulfa Antibiotics, Trazodone    Isolation: None   Infection: None   Code Status: Prior    Ht: 157.5 cm (62\")   Wt: 98.5 kg (217 lb 2.5 oz)    Admission Cmt: None   Principal Problem: Symptomatic cholelithiasis [K80.20]                   Active Insurance as of 8/7/2024       Primary Coverage       Payor Plan Insurance Group Employer/Plan Group    [facility] COMMUNITY PLAN [facility] COMMUNITY PLAN [facility]       Payor Plan Address Payor Plan Phone Number Payor Plan Fax Number Effective Dates    [address]   1/1/2022 - None Entered    [facility] [phone]         Subscriber Name Subscriber Birth Date Member ID       VIRGIL KEEN 1990 [id number]                     Emergency Contacts        (Rel.) Home Phone Work Phone Mobile Phone    KEIKO,PATI (Significant Other) -- -- [phone]    Bess Keen (Mother) -- -- [phone]              History & Physical    No notes of this type exist for this encounter.          Emergency Department Notes        Glo [employer] PCT at 08/07/24 0710          Taking pt to 308 att.     Electronically signed by [employer] PCT at 08/07/24 0710       Ashia León RN at " 08/07/24 0704          Report given to CHRISTEN Lancaster    Electronically signed by Ashia León RN at 08/07/24 0704       Vital Signs (last day)       Date/Time Temp Temp src Pulse Resp BP Patient Position SpO2    08/07/24 0728 98.4 (36.9) Oral 61 16 117/79 Sitting 100    08/07/24 0646 -- -- 58 16 140/86 -- 96    08/07/24 0616 -- -- 60 -- 144/87 -- 96    08/07/24 0545 -- -- 59 -- 133/96 -- 97    08/07/24 0516 -- -- 77 -- 143/79 -- 97    08/07/24 0446 -- -- 60 -- 146/95 -- 98    08/07/24 0416 -- -- 62 -- 140/81 -- 97    08/07/24 0346 -- -- 66 -- 125/86 -- 99    08/07/24 0316 -- -- 66 16 143/85 Sitting 97    08/07/24 0246 -- -- 70 -- 139/84 -- 96    08/07/24 02:18:22 -- -- 58 15 146/83 Sitting 100    08/07/24 0124 -- -- 63 -- 118/70 -- 98    08/07/24 0054 -- -- 64 -- 109/80 -- 98    08/07/24 0013 98.3 (36.8) Oral 67 18 139/89 Sitting 99          Current Facility-Administered Medications   Medication Dose Route Frequency Provider Last Rate Last Admin    HYDROmorphone (DILAUDID) injection 1 mg  1 mg Intravenous Q2H PRN Kristen Bear MD        lactated ringers infusion  125 mL/hr Intravenous Continuous Kristen Bear MD        ondansetron (ZOFRAN) injection 4 mg  4 mg Intravenous Q6H PRN Kristen Bear MD        sodium chloride 0.9 % flush 10 mL  10 mL Intravenous PRN Saravanan Vang MD         Lab Results (last 24 hours)       Procedure Component Value Units Date/Time    Urinalysis, Microscopic Only - Urine, Clean Catch [[id number]]  (Abnormal) Collected: 08/07/24 0120    Specimen: Urine, Clean Catch Updated: 08/07/24 0145     RBC, UA 11-20 /HPF      WBC, UA 3-5 /HPF      Bacteria, UA 2+ /HPF      Squamous Epithelial Cells, UA 3-6 /HPF      Hyaline Casts, UA None Seen /LPF      Mucus, UA Moderate/2+ /HPF      Methodology Manual Light Microscopy    Urinalysis With Microscopic If Indicated (No Culture) - Urine, Clean Catch [[id number]]  (Abnormal) Collected: 08/07/24 0120    Specimen: Urine, Clean Catch Updated:  08/07/24 0136     Color, UA Red     Appearance, UA Turbid     pH, UA 5.5     Specific Gravity, UA >=1.030     Glucose, UA Negative     Ketones, UA Trace     Bilirubin, UA Small (1+)     Blood, UA Large (3+)     Protein, UA >=300 mg/dL (3+)     Leuk Esterase, UA Small (1+)     Nitrite, UA Negative     Urobilinogen, UA 1.0 E.U./dL    Pregnancy, Urine - Urine, Clean Catch [[id number]]  (Normal) Collected: 08/07/24 0120    Specimen: Urine, Clean Catch Updated: 08/07/24 0134     HCG, Urine QL Negative    Single High Sensitivity Troponin T [[id number]]  (Normal) Collected: 08/07/24 0033    Specimen: Blood Updated: 08/07/24 0058     HS Troponin T <6 ng/L     Narrative:      High Sensitive Troponin T Reference Range:  <14.0 ng/L- Negative Female for AMI  <22.0 ng/L- Negative Male for AMI  >=14 - Abnormal Female indicating possible myocardial injury.  >=22 - Abnormal Male indicating possible myocardial injury.   Clinicians would have to utilize clinical acumen, EKG, Troponin, and serial changes to determine if it is an Acute Myocardial Infarction or myocardial injury due to an underlying chronic condition.         Comprehensive Metabolic Panel [[id number]]  (Abnormal) Collected: 08/07/24 0033    Specimen: Blood Updated: 08/07/24 0056     Glucose 119 mg/dL      BUN 11 mg/dL      Creatinine 0.91 mg/dL      Sodium 138 mmol/L      Potassium 4.2 mmol/L      Chloride 103 mmol/L      CO2 25.7 mmol/L      Calcium 9.4 mg/dL      Total Protein 7.4 g/dL      Albumin 4.0 g/dL      ALT (SGPT) 71 U/L      AST (SGOT) 38 U/L      Alkaline Phosphatase 108 U/L      Total Bilirubin 0.2 mg/dL      Globulin 3.4 gm/dL      A/G Ratio 1.2 g/dL      BUN/Creatinine Ratio 12.1     Anion Gap 9.3 mmol/L      eGFR 86.1 mL/min/1.73     Narrative:      GFR Normal >60  Chronic Kidney Disease <60  Kidney Failure <15      Lipase [[id number]]  (Normal) Collected: 08/07/24 0033    Specimen: Blood Updated: 08/07/24 0056     Lipase 28 U/L     [place] Draw [[id number]]  Collected: 08/07/24 0033    Specimen: Blood Updated: 08/07/24 0045    Narrative:      The following orders were created for panel order [place] Draw.  Procedure                               Abnormality         Status                     ---------                               -----------         ------                     Green Top (Gel)[[id number]]                                  Final result               Lavender Top[[id number]]                                     Final result               Gold Top - SST[[id number]]                                   Final result               Light Blue Top[[id number]]                                   Final result                 Please view results for these tests on the individual orders.    Green Top (Gel) [[id number]] Collected: 08/07/24 0033    Specimen: Blood Updated: 08/07/24 0045     Extra Tube Hold for add-ons.     Comment: Auto resulted.       Lavender Top [[id number]] Collected: 08/07/24 0033    Specimen: Blood Updated: 08/07/24 0045     Extra Tube hold for add-on     Comment: Auto resulted       Gold Top - SST [[id number]] Collected: 08/07/24 0033    Specimen: Blood Updated: 08/07/24 0045     Extra Tube Hold for add-ons.     Comment: Auto resulted.       Light Blue Top [[id number]] Collected: 08/07/24 0033    Specimen: Blood Updated: 08/07/24 0045     Extra Tube Hold for add-ons.     Comment: Auto resulted       CBC & Differential [[id number]]  (Normal) Collected: 08/07/24 0033    Specimen: Blood Updated: 08/07/24 0040    Narrative:      The following orders were created for panel order CBC & Differential.  Procedure                               Abnormality         Status                     ---------                               -----------         ------                     CBC Auto Differential[[id number]]        Normal              Final result                 Please view results for these tests on the individual orders.    CBC Auto Differential [[id number]]  (Normal)  Collected: 08/07/24 0033    Specimen: Blood Updated: 08/07/24 0040     WBC 5.73 10*3/mm3      RBC 4.77 10*6/mm3      Hemoglobin 13.7 g/dL      Hematocrit 41.6 %      MCV 87.2 fL      MCH 28.7 pg      MCHC 32.9 g/dL      RDW 14.2 %      RDW-SD 45.7 fl      MPV 8.6 fL      Platelets 328 10*3/mm3      Neutrophil % 60.1 %      Lymphocyte % 31.2 %      Monocyte % 6.5 %      Eosinophil % 1.6 %      Basophil % 0.3 %      Immature Grans % 0.3 %      Neutrophils, Absolute 3.44 10*3/mm3      Lymphocytes, Absolute 1.79 10*3/mm3      Monocytes, Absolute 0.37 10*3/mm3      Eosinophils, Absolute 0.09 10*3/mm3      Basophils, Absolute 0.02 10*3/mm3      Immature Grans, Absolute 0.02 10*3/mm3      nRBC 0.0 /100 WBC           Imaging Results (Last 24 Hours)       Procedure Component Value Units Date/Time    US Gallbladder [[id number]] Collected: 08/07/24 0429     Updated: 08/07/24 0431    Narrative:      FINAL REPORT    TECHNIQUE:  null    CLINICAL HISTORY:  RUQ abd pain, noncalcified gallstones on CT    COMPARISON:  null    FINDINGS:  ULTRASOUND ABDOMEN LIMITED    COMPARISON: CT abdomen/pelvis 08/07/2024.    FINDINGS: Cholelithiasis is noted. A stone is noted in the vicinity of the gallbladder neck. No gallbladder wall thickening or pericholecystic fluid. Common bile duct measures 4 mm in caliber.    A hyperechoic liver lesion is seen on images 11-13, measuring 1.7 x 2.4 x 1.7 cm. The appearance suggests a hemangioma.    Pancreas is not well visualized due to overlying bowel gas. Right kidney is unremarkable without stone or hydronephrosis.      Impression:      IMPRESSION:    1. Cholelithiasis is noted, including a stone in the vicinity of the gallbladder neck. No sonographic evidence of acute cholecystitis. No biliary ductal dilatation.    2. A 1.7 x 2.4 x 1.7 cm hemangioma is noted within the liver.    Authenticated and Electronically Signed by Zafar Denny MD on  08/07/2024 04:29:39 AM    CT Abdomen Pelvis With Contrast  [[id number]] Collected: 08/07/24 0238     Updated: 08/07/24 0240    Narrative:      FINAL REPORT    TECHNIQUE:  null    CLINICAL HISTORY:  Epigastric and right upper quadrant abdominal pain.    COMPARISON:  null    FINDINGS:  Exam: CT abdomen and pelvis with contrast    Comparison: None    Clinical history: Epigastric and right upper quadrant abdominal pain    Findings:    Lung bases are clear.    Small hypodense lesions in the liver seen on axial image 26 may represent small hemangiomas.    The spleen and pancreas do not demonstrate any acute process.    Suspected noncalcified gallstones. No gallbladder wall thickening or evidence for acute cholecystitis.    No choledocholithiasis or biliary obstruction.    Normal adrenal glands.    No renal calculi or hydronephrosis.    Symmetric enhancement of the kidneys bilaterally    No small bowel obstruction    No abdominal aortic aneurysm.    Appendix is normal in caliber    No colitis or diverticulitis.    Urinary bladder is decompressed without stones.    Uterus and adnexa have a normal appearance for CT    No free fluid      Impression:      IMPRESSION:    1. Suspected noncalcified gallstones. No gallbladder wall thickening or evidence for acute cholecystitis. Ultrasound can be performed for further evaluation.    Authenticated and Electronically Signed by Aury Lassiter MD  on 08/07/2024 02:38:56 AM          Physician Progress Notes (last 24 hours)  Notes from 08/06/24 0818 through 08/07/24 0818   No notes of this type exist for this encounter.       Consult Notes (last 24 hours)  Notes from 08/06/24 0818 through 08/07/24 0818   No notes of this type exist for this encounter.

## 2024-08-07 NOTE — ANESTHESIA POSTPROCEDURE EVALUATION
Patient: Lorena Keen    Procedure Summary       Date: 08/07/24 Room / Location: [facility] OR 2 /  [facility]    Anesthesia Start: 1711 Anesthesia Stop: 1914    Procedure: CHOLECYSTECTOMY LAPAROSCOPIC WITH DAVINCI ROBOT (Abdomen) Diagnosis:       Symptomatic cholelithiasis      Biliary colic      Intractable abdominal pain      (Symptomatic cholelithiasis [K80.20])      (Biliary colic [K80.50])      (Intractable abdominal pain [R10.9])    Surgeons: Kristen Bear MD Provider: Rene Merrill CRNA    Anesthesia Type: general ASA Status: 3            Anesthesia Type: general    Vitals  Vitals Value Taken Time   BP     Temp     Pulse     Resp     SpO2 100 % 08/07/24 1914   Vitals shown include unfiled device data.        Post Anesthesia Care and Evaluation    Patient location during evaluation: PACU  Patient participation: complete - patient participated  Level of consciousness: awake and alert and sleepy but conscious  Pain score: 2  Pain management: adequate    Airway patency: patent  Anesthetic complications: No anesthetic complications  PONV Status: none  Cardiovascular status: acceptable  Respiratory status: acceptable and face mask  Hydration status: acceptable

## 2024-08-07 NOTE — H&P
General Surgery Consult /H&P    Name:Lorena Keen  Age: 34 y.o.  Gender: female  : 1990  MRN: [id number]  Visit Number: [id number]  Admit Date: 2024  Date of Service: 24    Patient Care Team:  Provider, No Known as PCP - General    Reason for Consultation: abdominal pain, vomiting, gallstones    Chief complaint : Abdominal pain      History of Present Illness:     Lorena Keen is a 34 y.o. female patient who presented to the emergency department overnight complaining of Epigastric pain and nausea with the acute onset of symptoms sometime between 10 and 11 PM.  This occurred about an hour postprandially and the patient reported eating a veggie burger at [employer].  She reports at least a 2-year history of similar, but less severe episodes which began during her first pregnancy.  The symptoms improved after delivery of her child 2 years ago, but recurred during her more recent pregnancy.  Again, after delivery of her infant earlier this year, her symptoms improved, but she recently had recurrent symptoms prompting an ultrasound order from her PCP.  Unfortunately, this had not been performed.  She has been on PPI therapy without significant improvement, and reported that she came to the emergency department due to the severity of her symptoms.  There is no reported history of jaundice, acholic stools, or dark urine.      In the ED, the labs revealed a normal white blood cell count, hemoglobin, and hematocrit. Comprehensive metabolic panel revealed mild elevation of the transaminases with an AST of 38, and ALT of 71, with a normal alkaline phosphatase and normal bilirubin.  Her lipase level was normal.  She went on to have a CT scan of the abdomen and pelvis which demonstrated noncalcified gallstones without evidence of acute cholecystitis.  An ultrasound was then performed demonstrating multiple stones within the gallbladder including a stone within the gallbladder neck without  clear sonographic evidence of acute cholecystitis or biliary ductal dilatation.  Incidental note was made of a hemangioma within the liver.  Unfortunately, her symptoms could not be controlled in the emergency department, requiring admission to the facility for pain control and further surgical management of her gallbladder disease.  Unfortunately, she continued to have pain, and near intractable vomiting throughout the morning despite receiving numerous doses of IV pain medication and multiple antiemetics.      Patient Active Problem List   Diagnosis    Carpal tunnel syndrome    Depression    Generalized anxiety disorder    Gastroesophageal reflux disease    Irritable bowel syndrome    Migraine    OCD (obsessive compulsive disorder)    Thoracic myofascial strain    Tobacco abuse    Symptomatic cholelithiasis    Biliary colic    Intractable abdominal pain       Past Medical History:   Diagnosis Date    Anal fissure     Carpal tunnel syndrome 2016    Depression 2016    Gastroesophageal reflux disease 2016    Generalized anxiety disorder 2016    Irregular uterine bleeding     Metorrhagia.Pt states she had been spotting for approx 2 weeks, but this has stopped. PT usually has cycle every 3 months.     Irritable bowel syndrome 2016    Migraine 2016    Ovarian cyst     PMS (premenstrual syndrome)     Urinary tract infection     Varicella        Past Surgical History:   Procedure Laterality Date     SECTION N/A 10/24/2022    Procedure:  SECTION PRIMARY;  Surgeon: Brenden Swan MD;  Location: [facility];  Service: Obstetrics/Gynecology;  Laterality: N/A;     SECTION N/A 5/10/2024    Procedure:  SECTION REPEAT;  Surgeon: Brenden Swan MD;  Location: [facility];  Service: Obstetrics/Gynecology;  Laterality: N/A;       Family History   Problem Relation Age of Onset    Hypertension Mother     Hypertension Father     Diabetes Father      "Cancer Other        Social History     Socioeconomic History    Marital status: Single   Tobacco Use    Smoking status: Every Day     Current packs/day: 0.25     Types: Cigarettes    Smokeless tobacco: Never    Tobacco comments:     1/27/2016: Former Smoker   Vaping Use    Vaping status: Never Used   Substance and Sexual Activity    Alcohol use: Yes    Drug use: Not Currently     Types: Marijuana    Sexual activity: Yes     Partners: Male     Birth control/protection: Condom         Current Facility-Administered Medications:     HYDROmorphone (DILAUDID) injection 1 mg, 1 mg, Intravenous, Q2H PRN, Kristen Bear MD, 1 mg at 08/07/24 0852    lactated ringers infusion, 125 mL/hr, Intravenous, Continuous, Kristen Bear MD, Last Rate: 125 mL/hr at 08/07/24 1110, 125 mL/hr at 08/07/24 1110    ondansetron (ZOFRAN) injection 4 mg, 4 mg, Intravenous, Q6H PRN, Kristen Bear MD, 4 mg at 08/07/24 0852    prochlorperazine (COMPAZINE) injection 10 mg, 10 mg, Intravenous, Q6H PRN, Kristen Bear MD, 10 mg at 08/07/24 1130    sodium chloride 0.9 % flush 10 mL, 10 mL, Intravenous, PRN, Saravanan Vang MD    Medications Prior to Admission   Medication Sig Dispense Refill Last Dose    pantoprazole (PROTONIX) 40 MG EC tablet Take 1 tablet by mouth Daily.       SUMAtriptan (IMITREX) 25 MG tablet Take 1 tablet by mouth 1 (One) Time As Needed for Migraine.       norethindrone-ethinyl estradiol-iron (Microgestin FE 1.5/30) 1.5-30 MG-MCG tablet Take 1 tablet by mouth Daily. 28 tablet 12     prenatal vitamin (prenatal, CLASSIC, vitamin) tablet Take 1 tablet by mouth Daily.          Allergies   Allergen Reactions    Fluoxetine Other (See Comments)     STATES \"THEY MAKE ME WANT TO KILL MYSELF\" WORSE DEPRESSION    Sulfa Antibiotics Hives     Sulfa Drugs    Trazodone Other (See Comments)     TraZODone HCl TABS  LOSS OF SHORT TERM MEMORY        Review of Systems   Constitutional: Negative.    HENT: Negative.     Eyes: Negative.  "   Respiratory: Negative.     Cardiovascular: Negative.    Gastrointestinal: Negative.    Endocrine: Negative.    Genitourinary: Negative.    Musculoskeletal: Negative.    Skin: Negative.    Allergic/Immunologic: Negative.    Neurological: Negative.    Hematological: Negative.    Psychiatric/Behavioral: Negative.         OBJECTIVE:     Vital Signs  Temp:  [98.1 °F (36.7 °C)-98.4 °F (36.9 °C)] 98.1 °F (36.7 °C)  Heart Rate:  [58-77] 61  Resp:  [15-18] 16  BP: (109-146)/(70-96) 121/83    No intake/output data recorded.  No intake/output data recorded.      Physical Exam:      General Appearance:    Alert, cooperative, in mild distress due to pain   Head:    Normocephalic, without obvious abnormality, atraumatic   Eyes:            Lids and lashes normal, conjunctivae and sclerae normal, no icterus   Ears:    Ears appear intact with no abnormalities noted   Lungs:     Respirations regular, even and unlabored    Heart:    Regular rhythm and normal rate   Abdomen:     Soft, tender to palpation in the RUQ, non-distended, no guarding, no rebound   tenderness   Genitalia:    Deferred   Extremities:   Moves all extremities well, no edema, no cyanosis, no  redness   Pulses:   Pulses palpable and equal bilaterally   Skin:   No bleeding, bruising or rash   Neurologic:   AAOx3, no gross deficits         Results Review:  I have reviewed the entirety of the patient's clinical lab results.  I have also personally reviewed the patient's imaging    Narrative & Impression   FINAL REPORT     TECHNIQUE:  null     CLINICAL HISTORY:  Epigastric and right upper quadrant abdominal pain.     COMPARISON:  null     FINDINGS:  Exam: CT abdomen and pelvis with contrast     Comparison: None     Clinical history: Epigastric and right upper quadrant abdominal pain     Findings:     Lung bases are clear.     Small hypodense lesions in the liver seen on axial image 26 may represent small hemangiomas.     The spleen and pancreas do not demonstrate any  acute process.     Suspected noncalcified gallstones. No gallbladder wall thickening or evidence for acute cholecystitis.     No choledocholithiasis or biliary obstruction.     Normal adrenal glands.     No renal calculi or hydronephrosis.     Symmetric enhancement of the kidneys bilaterally     No small bowel obstruction     No abdominal aortic aneurysm.     Appendix is normal in caliber     No colitis or diverticulitis.     Urinary bladder is decompressed without stones.     Uterus and adnexa have a normal appearance for CT     No free fluid     IMPRESSION:  IMPRESSION:     1. Suspected noncalcified gallstones. No gallbladder wall thickening or evidence for acute cholecystitis. Ultrasound can be performed for further evaluation.     Authenticated and Electronically Signed by Aury Lassiter MD  on 08/07/2024 02:38:56 AM         Narrative & Impression   FINAL REPORT     TECHNIQUE:  null     CLINICAL HISTORY:  RUQ abd pain, noncalcified gallstones on CT     COMPARISON:  null     FINDINGS:  ULTRASOUND ABDOMEN LIMITED     COMPARISON: CT abdomen/pelvis 08/07/2024.     FINDINGS: Cholelithiasis is noted. A stone is noted in the vicinity of the gallbladder neck. No gallbladder wall thickening or pericholecystic fluid. Common bile duct measures 4 mm in caliber.     A hyperechoic liver lesion is seen on images 11-13, measuring 1.7 x 2.4 x 1.7 cm. The appearance suggests a hemangioma.     Pancreas is not well visualized due to overlying bowel gas. Right kidney is unremarkable without stone or hydronephrosis.     IMPRESSION:  IMPRESSION:     1. Cholelithiasis is noted, including a stone in the vicinity of the gallbladder neck. No sonographic evidence of acute cholecystitis. No biliary ductal dilatation.     2. A 1.7 x 2.4 x 1.7 cm hemangioma is noted within the liver.     Authenticated and Electronically Signed by Zafar Denny MD on  08/07/2024 04:29:39 AM     Lab Results (last 72 hours)       Procedure Component Value  Units Date/Time    Urinalysis, Microscopic Only - Urine, Clean Catch [[id number]]  (Abnormal) Collected: 08/07/24 0120    Specimen: Urine, Clean Catch Updated: 08/07/24 0145     RBC, UA 11-20 /HPF      WBC, UA 3-5 /HPF      Bacteria, UA 2+ /HPF      Squamous Epithelial Cells, UA 3-6 /HPF      Hyaline Casts, UA None Seen /LPF      Mucus, UA Moderate/2+ /HPF      Methodology Manual Light Microscopy    Urinalysis With Microscopic If Indicated (No Culture) - Urine, Clean Catch [[id number]]  (Abnormal) Collected: 08/07/24 0120    Specimen: Urine, Clean Catch Updated: 08/07/24 0136     Color, UA Red     Appearance, UA Turbid     pH, UA 5.5     Specific Gravity, UA >=1.030     Glucose, UA Negative     Ketones, UA Trace     Bilirubin, UA Small (1+)     Blood, UA Large (3+)     Protein, UA >=300 mg/dL (3+)     Leuk Esterase, UA Small (1+)     Nitrite, UA Negative     Urobilinogen, UA 1.0 E.U./dL    Pregnancy, Urine - Urine, Clean Catch [[id number]]  (Normal) Collected: 08/07/24 0120    Specimen: Urine, Clean Catch Updated: 08/07/24 0134     HCG, Urine QL Negative    Single High Sensitivity Troponin T [[id number]]  (Normal) Collected: 08/07/24 0033    Specimen: Blood Updated: 08/07/24 0058     HS Troponin T <6 ng/L     Narrative:      High Sensitive Troponin T Reference Range:  <14.0 ng/L- Negative Female for AMI  <22.0 ng/L- Negative Male for AMI  >=14 - Abnormal Female indicating possible myocardial injury.  >=22 - Abnormal Male indicating possible myocardial injury.   Clinicians would have to utilize clinical acumen, EKG, Troponin, and serial changes to determine if it is an Acute Myocardial Infarction or myocardial injury due to an underlying chronic condition.         Comprehensive Metabolic Panel [[id number]]  (Abnormal) Collected: 08/07/24 0033    Specimen: Blood Updated: 08/07/24 0056     Glucose 119 mg/dL      BUN 11 mg/dL      Creatinine 0.91 mg/dL      Sodium 138 mmol/L      Potassium 4.2 mmol/L      Chloride 103 mmol/L       CO2 25.7 mmol/L      Calcium 9.4 mg/dL      Total Protein 7.4 g/dL      Albumin 4.0 g/dL      ALT (SGPT) 71 U/L      AST (SGOT) 38 U/L      Alkaline Phosphatase 108 U/L      Total Bilirubin 0.2 mg/dL      Globulin 3.4 gm/dL      A/G Ratio 1.2 g/dL      BUN/Creatinine Ratio 12.1     Anion Gap 9.3 mmol/L      eGFR 86.1 mL/min/1.73     Narrative:      GFR Normal >60  Chronic Kidney Disease <60  Kidney Failure <15      Lipase [[id number]]  (Normal) Collected: 08/07/24 0033    Specimen: Blood Updated: 08/07/24 0056     Lipase 28 U/L     [place] Draw [[id number]] Collected: 08/07/24 0033    Specimen: Blood Updated: 08/07/24 0045    Narrative:      The following orders were created for panel order [place] Draw.  Procedure                               Abnormality         Status                     ---------                               -----------         ------                     Green Top (Gel)[[id number]]                                  Final result               Lavender Top[[id number]]                                     Final result               Gold Top - SST[[id number]]                                   Final result               Light Blue Top[[id number]]                                   Final result                 Please view results for these tests on the individual orders.    Green Top (Gel) [[id number]] Collected: 08/07/24 0033    Specimen: Blood Updated: 08/07/24 0045     Extra Tube Hold for add-ons.     Comment: Auto resulted.       Lavender Top [[id number]] Collected: 08/07/24 0033    Specimen: Blood Updated: 08/07/24 0045     Extra Tube hold for add-on     Comment: Auto resulted       Gold Top - SST [[id number]] Collected: 08/07/24 0033    Specimen: Blood Updated: 08/07/24 0045     Extra Tube Hold for add-ons.     Comment: Auto resulted.       Light Blue Top [[id number]] Collected: 08/07/24 0033    Specimen: Blood Updated: 08/07/24 0045     Extra Tube Hold for add-ons.     Comment: Auto resulted       CBC  & Differential [[id number]]  (Normal) Collected: 08/07/24 0033    Specimen: Blood Updated: 08/07/24 0040    Narrative:      The following orders were created for panel order CBC & Differential.  Procedure                               Abnormality         Status                     ---------                               -----------         ------                     CBC Auto Differential[[id number]]        Normal              Final result                 Please view results for these tests on the individual orders.    CBC Auto Differential [[id number]]  (Normal) Collected: 08/07/24 0033    Specimen: Blood Updated: 08/07/24 0040     WBC 5.73 10*3/mm3      RBC 4.77 10*6/mm3      Hemoglobin 13.7 g/dL      Hematocrit 41.6 %      MCV 87.2 fL      MCH 28.7 pg      MCHC 32.9 g/dL      RDW 14.2 %      RDW-SD 45.7 fl      MPV 8.6 fL      Platelets 328 10*3/mm3      Neutrophil % 60.1 %      Lymphocyte % 31.2 %      Monocyte % 6.5 %      Eosinophil % 1.6 %      Basophil % 0.3 %      Immature Grans % 0.3 %      Neutrophils, Absolute 3.44 10*3/mm3      Lymphocytes, Absolute 1.79 10*3/mm3      Monocytes, Absolute 0.37 10*3/mm3      Eosinophils, Absolute 0.09 10*3/mm3      Basophils, Absolute 0.02 10*3/mm3      Immature Grans, Absolute 0.02 10*3/mm3      nRBC 0.0 /100 WBC                             ASSESSMENT/PLAN:      Symptomatic cholelithiasis    Biliary colic    Intractable abdominal pain      Ms. Keen is a 34-year-old female patient with at least a 2-year history of recurrent biliary colic and imaging demonstrating gallstones.  I suspect that she likely has underlying acute cholecystitis despite no clear imaging findings to confirm the diagnosis.  I have recommended to the patient that we proceed with robotic assisted laparoscopic cholecystectomy for definitive management of symptoms related to underlying gallbladder disease.  We discussed the laparoscopic cholecystectomy procedure in detail along with the risks, benefits,  and alternatives.  We specifically discussed the risks of bleeding, infection, conversion to an open procedure, postoperative bile leak, common bile duct injury, the need for ERCP (in the setting of bile leak, choledocholithiasis, etc.), and the risks related to anesthesia.  The patient understands these, and is willing to proceed.      Kristen Bear MD  08/07/24  13:01 EDT

## 2024-08-07 NOTE — ANESTHESIA PREPROCEDURE EVALUATION
Anesthesia Evaluation     Patient summary reviewed, Nursing notes reviewed and pregnancy test completed   no history of anesthetic complications:   NPO Solid Status: > 8 hours  NPO Liquid Status: > 8 hours           Airway   Mallampati: II  TM distance: >3 FB  Neck ROM: full  Possible difficult intubation  Dental - normal exam     Pulmonary - normal exam   (+) a smoker Current,  Cardiovascular - negative cardio ROS and normal exam  Exercise tolerance: good (4-7 METS)    ECG reviewed      ROS comment: EKG: SR     Neuro/Psych  (+) headaches (migraines), numbness (carpal tunnel syndrome), psychiatric history (OCD) Anxiety and Depression  GI/Hepatic/Renal/Endo    (+) GERD poorly controlled    Musculoskeletal     Abdominal    Substance History   (+) alcohol use     OB/GYN negative ob/gyn ROS     Comment: Urine pregnancy: negative       Other        ROS/Med Hx Other: 13.7/41.6  K 4.2                Anesthesia Plan    ASA 3     general     (Risks and benefits discussed including risk of aspiration, recall and dental damage. All patient questions answered. Will continue with POC.)  intravenous induction     Anesthetic plan, risks, benefits, and alternatives have been provided, discussed and informed consent has been obtained with: patient.    Plan discussed with CRNA.    CODE STATUS:    Level Of Support Discussed With: Patient  Code Status (Patient has no pulse and is not breathing): CPR (Attempt to Resuscitate)  Medical Interventions (Patient has pulse or is breathing): Full Support

## 2024-08-08 VITALS
WEIGHT: 217.15 LBS | HEART RATE: 83 BPM | OXYGEN SATURATION: 96 % | TEMPERATURE: 98.2 F | RESPIRATION RATE: 18 BRPM | BODY MASS INDEX: 39.96 KG/M2 | DIASTOLIC BLOOD PRESSURE: 82 MMHG | HEIGHT: 62 IN | SYSTOLIC BLOOD PRESSURE: 114 MMHG

## 2024-08-08 LAB
ALBUMIN SERPL-MCNC: 3.4 G/DL (ref 3.5–5.2)
ALBUMIN/GLOB SERPL: 1.1 G/DL
ALP SERPL-CCNC: 95 U/L (ref 39–117)
ALT SERPL W P-5'-P-CCNC: 61 U/L (ref 1–33)
ANION GAP SERPL CALCULATED.3IONS-SCNC: 10.1 MMOL/L (ref 5–15)
AST SERPL-CCNC: 44 U/L (ref 1–32)
BASOPHILS # BLD AUTO: 0 10*3/MM3 (ref 0–0.2)
BASOPHILS NFR BLD AUTO: 0 % (ref 0–1.5)
BILIRUB SERPL-MCNC: 0.4 MG/DL (ref 0–1.2)
BUN SERPL-MCNC: 7 MG/DL (ref 6–20)
BUN/CREAT SERPL: 9.2 (ref 7–25)
CALCIUM SPEC-SCNC: 8.6 MG/DL (ref 8.6–10.5)
CHLORIDE SERPL-SCNC: 108 MMOL/L (ref 98–107)
CO2 SERPL-SCNC: 21.9 MMOL/L (ref 22–29)
CREAT SERPL-MCNC: 0.76 MG/DL (ref 0.57–1)
DEPRECATED RDW RBC AUTO: 46.4 FL (ref 37–54)
EGFRCR SERPLBLD CKD-EPI 2021: 106.9 ML/MIN/1.73
EOSINOPHIL # BLD AUTO: 0 10*3/MM3 (ref 0–0.4)
EOSINOPHIL NFR BLD AUTO: 0 % (ref 0.3–6.2)
ERYTHROCYTE [DISTWIDTH] IN BLOOD BY AUTOMATED COUNT: 14.4 % (ref 12.3–15.4)
GLOBULIN UR ELPH-MCNC: 3 GM/DL
GLUCOSE SERPL-MCNC: 134 MG/DL (ref 65–99)
HCT VFR BLD AUTO: 37 % (ref 34–46.6)
HGB BLD-MCNC: 12 G/DL (ref 12–15.9)
IMM GRANULOCYTES # BLD AUTO: 0.02 10*3/MM3 (ref 0–0.05)
IMM GRANULOCYTES NFR BLD AUTO: 0.3 % (ref 0–0.5)
LYMPHOCYTES # BLD AUTO: 0.91 10*3/MM3 (ref 0.7–3.1)
LYMPHOCYTES NFR BLD AUTO: 12.4 % (ref 19.6–45.3)
MCH RBC QN AUTO: 28.6 PG (ref 26.6–33)
MCHC RBC AUTO-ENTMCNC: 32.4 G/DL (ref 31.5–35.7)
MCV RBC AUTO: 88.3 FL (ref 79–97)
MONOCYTES # BLD AUTO: 0.38 10*3/MM3 (ref 0.1–0.9)
MONOCYTES NFR BLD AUTO: 5.2 % (ref 5–12)
NEUTROPHILS NFR BLD AUTO: 6.05 10*3/MM3 (ref 1.7–7)
NEUTROPHILS NFR BLD AUTO: 82.1 % (ref 42.7–76)
NRBC BLD AUTO-RTO: 0 /100 WBC (ref 0–0.2)
PLATELET # BLD AUTO: 259 10*3/MM3 (ref 140–450)
PMV BLD AUTO: 9.5 FL (ref 6–12)
POTASSIUM SERPL-SCNC: 4.3 MMOL/L (ref 3.5–5.2)
PROT SERPL-MCNC: 6.4 G/DL (ref 6–8.5)
RBC # BLD AUTO: 4.19 10*6/MM3 (ref 3.77–5.28)
SODIUM SERPL-SCNC: 140 MMOL/L (ref 136–145)
WBC NRBC COR # BLD AUTO: 7.36 10*3/MM3 (ref 3.4–10.8)

## 2024-08-08 PROCEDURE — 80053 COMPREHEN METABOLIC PANEL: CPT | Performed by: SURGERY

## 2024-08-08 PROCEDURE — 25010000002 PIPERACILLIN SOD-TAZOBACTAM PER 1 G: Performed by: SURGERY

## 2024-08-08 PROCEDURE — G0378 HOSPITAL OBSERVATION PER HR: HCPCS

## 2024-08-08 PROCEDURE — 85025 COMPLETE CBC W/AUTO DIFF WBC: CPT | Performed by: SURGERY

## 2024-08-08 PROCEDURE — 25010000002 HEPARIN (PORCINE) PER 1000 UNITS: Performed by: SURGERY

## 2024-08-08 RX ORDER — HYDROCODONE BITARTRATE AND ACETAMINOPHEN 7.5; 325 MG/1; MG/1
1 TABLET ORAL EVERY 4 HOURS PRN
Qty: 10 TABLET | Refills: 0 | Status: SHIPPED | OUTPATIENT
Start: 2024-08-08 | End: 2024-08-13

## 2024-08-08 RX ADMIN — ACETAMINOPHEN 1000 MG: 500 TABLET, FILM COATED ORAL at 05:22

## 2024-08-08 RX ADMIN — HEPARIN SODIUM 5000 UNITS: 5000 INJECTION INTRAVENOUS; SUBCUTANEOUS at 14:52

## 2024-08-08 RX ADMIN — ACETAMINOPHEN 1000 MG: 500 TABLET, FILM COATED ORAL at 01:06

## 2024-08-08 RX ADMIN — HEPARIN SODIUM 5000 UNITS: 5000 INJECTION INTRAVENOUS; SUBCUTANEOUS at 05:22

## 2024-08-08 RX ADMIN — PIPERACILLIN SODIUM AND TAZOBACTAM SODIUM 3.38 G: 3; .375 INJECTION, POWDER, LYOPHILIZED, FOR SOLUTION INTRAVENOUS at 01:15

## 2024-08-08 RX ADMIN — ACETAMINOPHEN 1000 MG: 500 TABLET, FILM COATED ORAL at 11:29

## 2024-08-08 RX ADMIN — PIPERACILLIN SODIUM AND TAZOBACTAM SODIUM 3.38 G: 3; .375 INJECTION, POWDER, LYOPHILIZED, FOR SOLUTION INTRAVENOUS at 09:26

## 2024-08-08 NOTE — PLAN OF CARE
Goal Outcome Evaluation:  Plan of Care Reviewed With: patient        Progress: improving       Problem: Bleeding (Cholecystectomy)  Goal: Absence of Bleeding  Outcome: Ongoing, Progressing  Intervention: Monitor and Manage Bleeding  Recent Flowsheet Documentation  Taken 8/7/2024 2021 by Arcelia Gaxiola LPN  Bleeding Management: dressing monitored     Problem: Bowel Motility Impaired (Cholecystectomy)  Goal: Effective Bowel Elimination  Outcome: Ongoing, Progressing     Problem: Fluid and Electrolyte Imbalance (Cholecystectomy)  Goal: Fluid and Electrolyte Balance  Outcome: Ongoing, Progressing     Problem: Infection (Cholecystectomy)  Goal: Absence of Infection Signs and Symptoms  Outcome: Ongoing, Progressing     Problem: Ongoing Anesthesia Effects (Cholecystectomy)  Goal: Anesthesia/Sedation Recovery  Outcome: Ongoing, Progressing  Intervention: Optimize Anesthesia Recovery  Recent Flowsheet Documentation  Taken 8/8/2024 0600 by Tincher, Arcelia, LPN  Safety Promotion/Fall Prevention:   activity supervised   clutter free environment maintained   assistive device/personal items within reach   fall prevention program maintained   nonskid shoes/slippers when out of bed   room organization consistent   safety round/check completed  Taken 8/8/2024 0400 by Arcelia Gaxiola LPN  Safety Promotion/Fall Prevention:   activity supervised   assistive device/personal items within reach   clutter free environment maintained   fall prevention program maintained   nonskid shoes/slippers when out of bed   room organization consistent   safety round/check completed  Taken 8/8/2024 0200 by Arcelia Gaxiola LPN  Safety Promotion/Fall Prevention:   activity supervised   assistive device/personal items within reach   clutter free environment maintained   fall prevention program maintained   nonskid shoes/slippers when out of bed   room organization consistent   safety round/check completed  Taken 8/8/2024 0000 by Minor  SHARON Paniagua  Safety Promotion/Fall Prevention:   activity supervised   assistive device/personal items within reach   clutter free environment maintained   fall prevention program maintained   nonskid shoes/slippers when out of bed   room organization consistent   safety round/check completed  Taken 8/7/2024 2203 by Tincher, Arcelia, LPN  Safety Promotion/Fall Prevention:   activity supervised   assistive device/personal items within reach   clutter free environment maintained   fall prevention program maintained   nonskid shoes/slippers when out of bed   room organization consistent   safety round/check completed  Taken 8/7/2024 2200 by Arcelia Gaxiola LPN  Administration (IS): self-administered  Taken 8/7/2024 2100 by Arcelia Gaxiola LPN  Administration (IS):   instruction provided, follow-up   self-administered  Taken 8/7/2024 2021 by Tincher, Arcelia, LPN  Safety Promotion/Fall Prevention:   assistive device/personal items within reach   activity supervised   clutter free environment maintained   fall prevention program maintained   nonskid shoes/slippers when out of bed   room organization consistent   safety round/check completed  Taken 8/7/2024 2000 by Arcelia Gaxiola LPN  Administration (IS):   instruction provided, follow-up   self-administered     Problem: Pain (Cholecystectomy)  Goal: Acceptable Pain Control  Outcome: Ongoing, Progressing  Intervention: Prevent or Manage Pain  Recent Flowsheet Documentation  Taken 8/7/2024 2021 by Arcelia Gaxiola LPN  Diversional Activities:   television   smartphone     Problem: Postoperative Nausea and Vomiting (Cholecystectomy)  Goal: Nausea and Vomiting Relief  Outcome: Ongoing, Progressing     Problem: Postoperative Urinary Retention (Cholecystectomy)  Goal: Effective Urinary Elimination  Outcome: Ongoing, Progressing     Problem: Respiratory Compromise (Cholecystectomy)  Goal: Effective Oxygenation and Ventilation  Outcome: Ongoing,  Progressing  Intervention: Optimize Oxygenation and Ventilation  Recent Flowsheet Documentation  Taken 8/8/2024 0600 by Arcelia Gaxiola LPN  Head of Bed (HOB) Positioning: HOB elevated  Taken 8/8/2024 0400 by Arcelia Gaxiola LPN  Head of Bed (HOB) Positioning: HOB elevated  Taken 8/8/2024 0200 by Arcelia Gaxiola LPN  Head of Bed (HOB) Positioning: HOB elevated  Taken 8/8/2024 0000 by Arcelia Gaxiola LPN  Head of Bed (HOB) Positioning: HOB elevated  Taken 8/7/2024 2203 by Arcelia Gaxiola LPN  Head of Bed (HOB) Positioning: HOB elevated  Taken 8/7/2024 2021 by Arcelia Gaxiola LPN  Head of Bed (HOB) Positioning: HOB elevated     Problem: Adult Inpatient Plan of Care  Goal: Plan of Care Review  Outcome: Ongoing, Progressing  Flowsheets (Taken 8/8/2024 0612)  Progress: improving  Plan of Care Reviewed With: patient  Goal: Patient-Specific Goal (Individualized)  Outcome: Ongoing, Progressing  Goal: Absence of Hospital-Acquired Illness or Injury  Outcome: Ongoing, Progressing  Intervention: Identify and Manage Fall Risk  Recent Flowsheet Documentation  Taken 8/8/2024 0600 by Tincher, Arcelia, LPN  Safety Promotion/Fall Prevention:   activity supervised   clutter free environment maintained   assistive device/personal items within reach   fall prevention program maintained   nonskid shoes/slippers when out of bed   room organization consistent   safety round/check completed  Taken 8/8/2024 0400 by Arcelia Gaxiola LPN  Safety Promotion/Fall Prevention:   activity supervised   assistive device/personal items within reach   clutter free environment maintained   fall prevention program maintained   nonskid shoes/slippers when out of bed   room organization consistent   safety round/check completed  Taken 8/8/2024 0200 by Arcelia Gaxiola LPN  Safety Promotion/Fall Prevention:   activity supervised   assistive device/personal items within reach   clutter free environment maintained   fall  prevention program maintained   nonskid shoes/slippers when out of bed   room organization consistent   safety round/check completed  Taken 8/8/2024 0000 by Tincher, Arcelia, LPN  Safety Promotion/Fall Prevention:   activity supervised   assistive device/personal items within reach   clutter free environment maintained   fall prevention program maintained   nonskid shoes/slippers when out of bed   room organization consistent   safety round/check completed  Taken 8/7/2024 2203 by Tincher, Arcelia, LPN  Safety Promotion/Fall Prevention:   activity supervised   assistive device/personal items within reach   clutter free environment maintained   fall prevention program maintained   nonskid shoes/slippers when out of bed   room organization consistent   safety round/check completed  Taken 8/7/2024 2021 by Tincher, Arcelia, LPN  Safety Promotion/Fall Prevention:   assistive device/personal items within reach   activity supervised   clutter free environment maintained   fall prevention program maintained   nonskid shoes/slippers when out of bed   room organization consistent   safety round/check completed  Intervention: Prevent Skin Injury  Recent Flowsheet Documentation  Taken 8/8/2024 0600 by Arcelia Gaxiola LPN  Body Position: supine, legs elevated  Taken 8/8/2024 0400 by Arcelia Gaxiola LPN  Body Position: supine, legs elevated  Taken 8/8/2024 0200 by Arcelia Gaxiola LPN  Body Position: supine, legs elevated  Taken 8/8/2024 0000 by Arcelia Gaxiola LPN  Body Position: supine, legs elevated  Taken 8/7/2024 2203 by Arcelia Gaxiola LPN  Body Position:   sitting up in bed   position changed independently  Taken 8/7/2024 2021 by Arcelia Gaxiola LPN  Body Position: sitting up in bed  Intervention: Prevent and Manage VTE (Venous Thromboembolism) Risk  Recent Flowsheet Documentation  Taken 8/8/2024 0600 by Arcelia Gaxiola LPN  Activity Management: activity encouraged  Taken 8/8/2024 0400 by  Arcelia Gaxiola LPN  Activity Management: activity encouraged  Taken 8/8/2024 0200 by Arcelia Gaxiola LPN  Activity Management: activity encouraged  Taken 8/8/2024 0000 by Arcelia Gaxiola LPN  Activity Management: activity encouraged  Taken 8/7/2024 2203 by Arcelia Gaxiola LPN  Activity Management: activity encouraged  Taken 8/7/2024 2021 by Arcelia Gaxiola LPN  Activity Management:   up ad tashi   activity encouraged  VTE Prevention/Management:   bilateral   sequential compression devices on  Range of Motion: active ROM (range of motion) encouraged  Intervention: Prevent Infection  Recent Flowsheet Documentation  Taken 8/8/2024 0400 by Arcelia Gaxiola LPN  Infection Prevention:   environmental surveillance performed   hand hygiene promoted   rest/sleep promoted   single patient room provided  Taken 8/8/2024 0200 by Arcelia Gaxiola LPN  Infection Prevention:   environmental surveillance performed   rest/sleep promoted   single patient room provided   hand hygiene promoted  Taken 8/7/2024 2021 by Arcelia Gaxiola LPN  Infection Prevention:   environmental surveillance performed   hand hygiene promoted   rest/sleep promoted   single patient room provided  Goal: Optimal Comfort and Wellbeing  Outcome: Ongoing, Progressing  Intervention: Provide Person-Centered Care  Recent Flowsheet Documentation  Taken 8/7/2024 2021 by Arcelia Gaxiola LPN  Trust Relationship/Rapport:   care explained   choices provided   emotional support provided  Goal: Readiness for Transition of Care  Outcome: Ongoing, Progressing

## 2024-08-08 NOTE — CASE MANAGEMENT/SOCIAL WORK
Met with pt, no dc concerns. Pt states she is waiting to see if she will be released today or tomorrow. DO at bedside, supportive.

## 2024-08-08 NOTE — CASE MANAGEMENT/SOCIAL WORK
Case Management Discharge Note                Selected Continued Care - Admitted Since 8/7/2024       Destination    No services have been selected for the patient.                Durable Medical Equipment    No services have been selected for the patient.                Dialysis/Infusion    No services have been selected for the patient.                Home Medical Care    No services have been selected for the patient.                Therapy    No services have been selected for the patient.                Community Resources    No services have been selected for the patient.                [facility]    No services have been selected for the patient.                    Transportation Services  Private: Car    Final Discharge Disposition Code: 01 - home or self-care

## 2024-08-09 LAB — REF LAB TEST METHOD: NORMAL

## 2024-08-13 ENCOUNTER — OFFICE VISIT (OUTPATIENT)
Dept: SURGERY | Facility: CLINIC | Age: 34
End: 2024-08-13
Payer: MEDICAID

## 2024-08-13 VITALS
BODY MASS INDEX: 38.53 KG/M2 | HEART RATE: 111 BPM | OXYGEN SATURATION: 99 % | SYSTOLIC BLOOD PRESSURE: 110 MMHG | HEIGHT: 62 IN | TEMPERATURE: 96.4 F | DIASTOLIC BLOOD PRESSURE: 68 MMHG | WEIGHT: 209.4 LBS

## 2024-08-13 DIAGNOSIS — Z90.49 S/P LAPAROSCOPIC CHOLECYSTECTOMY: Primary | ICD-10-CM

## 2024-08-13 DIAGNOSIS — R79.89 ELEVATED LFTS: ICD-10-CM

## 2024-08-13 PROBLEM — K80.00 ACUTE CALCULOUS CHOLECYSTITIS: Status: RESOLVED | Noted: 2024-08-07 | Resolved: 2024-08-13

## 2024-08-13 PROBLEM — R10.9 INTRACTABLE ABDOMINAL PAIN: Status: RESOLVED | Noted: 2024-08-07 | Resolved: 2024-08-13

## 2024-08-13 PROBLEM — K80.50 BILIARY COLIC: Status: RESOLVED | Noted: 2024-08-07 | Resolved: 2024-08-13

## 2024-08-13 PROCEDURE — 99024 POSTOP FOLLOW-UP VISIT: CPT | Performed by: SURGERY

## 2024-08-13 PROCEDURE — 1159F MED LIST DOCD IN RCRD: CPT | Performed by: SURGERY

## 2024-08-13 PROCEDURE — 1160F RVW MEDS BY RX/DR IN RCRD: CPT | Performed by: SURGERY

## 2024-08-13 NOTE — PROGRESS NOTES
"Subjective   Lorena Keen is a 34 y.o. female.   Chief Complaint   Patient presents with    Post-op Follow-up     Lap negrita        History of Present Illness     Ms. Keen comes to the office for routine post-op following laparoscopic cholecystectomy for management of acute cholecystitis.  Final pathology demonstrated acute and chronic ulcerative cholecystitis with cholelithiasis.  The patient reports that she is doing well postoperatively.  She reports minimal residual abdominal soreness around the port site.  She denies fever, chills, nausea, vomiting, or diarrhea.  She is tolerating a diet and having regular bowel movements.      The following portions of the patient's history were reviewed and updated as appropriate: allergies, current medications, past family history, past medical history, past social history, past surgical history, and problem list.    Review of Systems    Objective   /68   Pulse 111   Temp 96.4 °F (35.8 °C)   Ht 157.5 cm (62.01\")   Wt 95 kg (209 lb 6.4 oz)   LMP 08/06/2024 (Exact Date)   SpO2 99%   BMI 38.29 kg/m²     Physical Exam  Constitutional:       Appearance: She is well-developed.   HENT:      Head: Normocephalic and atraumatic.   Eyes:      General: No scleral icterus.  Cardiovascular:      Rate and Rhythm: Regular rhythm.   Pulmonary:      Effort: Pulmonary effort is normal.   Abdominal:      General: There is no distension.      Palpations: Abdomen is soft.      Tenderness: There is no abdominal tenderness.      Comments: Port site incisions healing well.   Musculoskeletal:      Cervical back: Neck supple.   Skin:     General: Skin is warm and dry.   Neurological:      Mental Status: She is alert and oriented to person, place, and time.   Psychiatric:         Behavior: Behavior normal.         [facility]  [address] [place] [address]  Phone: [phone] or [phone]  Fax: [phone]  Fan MCLAIN" ELIAS Looney, Medical Director    PATIENT NAME                           LABORATORY NO.  [id number]  VIRGIL SOLIS                   [id number]  [id number]                         AGE              SEX  SSN           CLIENT REF #  [facility]            34      1990  F    [id number]   [id number]    [address]                REQUESTING M.D.     ATTENDING M.D.     COPY TO.  [address]                        [place] [address]                 DATE COLLECTED      DATE RECEIVED      DATE REPORTED  2024    DIAGNOSIS:  GALLBLADDER:  Acute and chronic ulcerative cholecystitis with cholelithiasis    AQUILINO    CLINICAL HISTORY:  Symptomatic cholelithiasis, biliary colic, intractable abdominal pain    SPECIMENS RECEIVED:  GALLBLADDER    MICROSCOPIC DESCRIPTION:  Tissue blocks are prepared and slides are examined microscopically on all  specimens. See diagnosis for details.    Professional interpretation rendered by Mathieu Schmitt M.D., F.C.A.P. at [facility], [facility], [address] [place] [address].    GROSS DESCRIPTION:  Received in formalin labeled gallbladder is a 6.6 x 2.9 x 2.5 cm partially  disrupted gallbladder with a clamped cystic duct that is patent. The serosa is tan-  pink, smooth, and glistening. The hepatic surface is tan-red and shaggy with a  1.2 cm greatest dimension transmural defect, consistent with surgical artifact.  The lumen contains no bile with multiple yellow, bosselated calculi measuring  3.7 x 3.5 x 1.4 cm in aggregate.  The mucosa is pink-red and velvety to slightly  ragged. The gallbladder wall has an average wall thickness of 0.5 cm. No  pericystic duct lymph node is identified. No mucosal polyps or other lesions are  present.  Representative sections of the gallbladder wall and the entire en face  cystic duct margin are submitted in 1 cassette labeled A1.   KHADRA    REVIEWED, DIAGNOSED AND  ELECTRONICALLY  SIGNED BY:    Mathieu Schmitt M.D., MICHAEL.  CPT CODES:  73101     Assessment & Plan   Diagnoses and all orders for this visit:    1. S/P laparoscopic cholecystectomy (Primary)        I had the pleasure of seeing Lorena Keen in follow-up today for the first  postoperative visit following laparoscopic cholecystectomy for acute and chronic ulcerative cholecystitis.  Overall, Lorena Keen  is enjoying uncomplicated recovery.  I do not anticipate any ongoing surgical issues and will return the patient back to the care of their PCP.  I have released Lorena Keen to unrestricted physical activity beginning four weeks after her operative date. The patient may follow up in this office as needed.

## 2024-08-15 NOTE — DISCHARGE SUMMARY
Date of Discharge:  8/08/2024    Discharge Diagnosis:     Active Hospital Problems   No active problems to display.      Resolved Hospital Problems    Diagnosis Date Resolved POA    **Acute calculous cholecystitis [K80.00] 08/13/2024 Yes    Biliary colic [K80.50] 08/13/2024 Yes    Intractable abdominal pain [R10.9] 08/13/2024 Yes       Presenting Problem/History of Present Illness    Lorena Keen is a 34 y.o. female patient who presented to the emergency department overnight complaining of Epigastric pain and nausea with the acute onset of symptoms sometime between 10 and 11 PM.  This occurred about an hour postprandially and the patient reported eating a veggie burger at [employer].  She reports at least a 2-year history of similar, but less severe episodes which began during her first pregnancy.  The symptoms improved after delivery of her child 2 years ago, but recurred during her more recent pregnancy.  Again, after delivery of her infant earlier this year, her symptoms improved, but she recently had recurrent symptoms prompting an ultrasound order from her PCP.  Unfortunately, this had not been performed.  She has been on PPI therapy without significant improvement, and reported that she came to the emergency department due to the severity of her symptoms.  There is no reported history of jaundice, acholic stools, or dark urine.        In the ED, the labs revealed a normal white blood cell count, hemoglobin, and hematocrit. Comprehensive metabolic panel revealed mild elevation of the transaminases with an AST of 38, and ALT of 71, with a normal alkaline phosphatase and normal bilirubin.  Her lipase level was normal.  She went on to have a CT scan of the abdomen and pelvis which demonstrated noncalcified gallstones without evidence of acute cholecystitis.  An ultrasound was then performed demonstrating multiple stones within the gallbladder including a stone within the gallbladder neck without  clear sonographic evidence of acute cholecystitis or biliary ductal dilatation.  Incidental note was made of a hemangioma within the liver.  Unfortunately, her symptoms could not be controlled in the emergency department, requiring admission to the facility for pain control and further surgical management of her gallbladder disease.  Unfortunately, she continued to have pain, and near intractable vomiting throughout the morning despite receiving numerous doses of IV pain medication and multiple antiemetics.    Hospital Course    The patient was taken to the operating room on the day of admission, and underwent a robotic assisted laparoscopic cholecystectomy using the da Natalie robot.  For complete details, please refer to the separately dictated operative report.  The patient was noted to have changes consistent with acute calculus cholecystitis with severe inflammatory change of the gallbladder.  Postoperatively, she was returned to the regular nursing floor for ongoing care including IV antibiotics.  She did well overnight, with no acute events noted.  On postoperative day 1 she was reevaluated and felt to be appropriate for discharge home.  Arrangements were made, and she was discharged home in good condition with her family.  She will follow-up with me in 1 week.      Procedures Performed    Procedure(s):  CHOLECYSTECTOMY LAPAROSCOPIC WITH DAVINCI ROBOT  -------------------       Consults:   Consults       No orders found from 7/9/2024 to 8/8/2024.            Pertinent Test Results:       Component      Latest Ref Rng 8/7/2024 8/8/2024   WBC      3.40 - 10.80 10*3/mm3 5.73  7.36    RBC      3.77 - 5.28 10*6/mm3 4.77  4.19    Hemoglobin      12.0 - 15.9 g/dL 13.7  12.0    Hematocrit      34.0 - 46.6 % 41.6  37.0    MCV      79.0 - 97.0 fL 87.2  88.3    MCH      26.6 - 33.0 pg 28.7  28.6    MCHC      31.5 - 35.7 g/dL 32.9  32.4    RDW      12.3 - 15.4 % 14.2  14.4    RDW-SD      37.0 - 54.0 fl 45.7  46.4    MPV       6.0 - 12.0 fL 8.6  9.5    Platelets      140 - 450 10*3/mm3 328  259    Neutrophil Rel %      42.7 - 76.0 % 60.1  82.1 (H)    Lymphocyte Rel %      19.6 - 45.3 % 31.2  12.4 (L)    Monocyte Rel %      5.0 - 12.0 % 6.5  5.2    Eosinophil Rel %      0.3 - 6.2 % 1.6  0.0 (L)    Basophil Rel %      0.0 - 1.5 % 0.3  0.0    Immature Granulocyte Rel %      0.0 - 0.5 % 0.3  0.3    Neutrophils Absolute      1.70 - 7.00 10*3/mm3 3.44  6.05    Lymphocytes Absolute      0.70 - 3.10 10*3/mm3 1.79  0.91    Monocytes Absolute      0.10 - 0.90 10*3/mm3 0.37  0.38    Eosinophils Absolute      0.00 - 0.40 10*3/mm3 0.09  0.00    Basophils Absolute      0.00 - 0.20 10*3/mm3 0.02  0.00    Immature Grans, Absolute      0.00 - 0.05 10*3/mm3 0.02  0.02    nRBC      0.0 - 0.2 /100 WBC 0.0  0.0    Glucose      65 - 99 mg/dL 119 (H)  134 (H)    BUN      6 - 20 mg/dL 11  7    Creatinine      0.57 - 1.00 mg/dL 0.91  0.76    Sodium      136 - 145 mmol/L 138  140    Potassium      3.5 - 5.2 mmol/L 4.2  4.3    Chloride      98 - 107 mmol/L 103  108 (H)    CO2      22.0 - 29.0 mmol/L 25.7  21.9 (L)    Calcium      8.6 - 10.5 mg/dL 9.4  8.6    Total Protein      6.0 - 8.5 g/dL 7.4  6.4    Albumin      3.5 - 5.2 g/dL 4.0  3.4 (L)    ALT (SGPT)      1 - 33 U/L 71 (H)  61 (H)    AST (SGOT)      1 - 32 U/L 38 (H)  44 (H)    Alkaline Phosphatase      39 - 117 U/L 108  95    Total Bilirubin      0.0 - 1.2 mg/dL 0.2  0.4    Globulin      gm/dL 3.4  3.0    A/G Ratio      g/dL 1.2  1.1    BUN/Creatinine Ratio      7.0 - 25.0  12.1  9.2    Anion Gap      5.0 - 15.0 mmol/L 9.3  10.1    eGFR      >60.0 mL/min/1.73 86.1  106.9    Color, UA      Yellow, Straw  Red !     Appearance, UA      Clear  Turbid !     pH, UA      5.0 - 8.0  5.5     Specific Gravity, UA      1.005 - 1.030  >=1.030     Glucose      Negative  Negative     Ketones, UA      Negative  Trace !     Bilirubin, UA      Negative  Small (1+) !     Blood, UA      Negative  Large (3+) !     Protein,  "UA      Negative  >=300 mg/dL (3+) !     Leukocytes, UA      Negative  Small (1+) !     Nitrite, UA      Negative  Negative     Urobilinogen, UA      0.2 - 1.0 E.U./dL  1.0 E.U./dL     RBC, UA      None Seen, 0-2 /HPF 11-20 !     WBC, UA      None Seen, 0-2 /HPF 3-5 !     Bacteria, UA      None Seen /HPF 2+ !     Squamous Epithelial Cells, UA      None Seen, 0-2 /HPF 3-6 !     Hyaline Casts, UA      None Seen /LPF None Seen     Mucus, UA      None Seen, Trace /HPF Moderate/2+ !     Methodology: Manual Light Microscopy     Lipase      13 - 60 U/L 28     HS Troponin T      <14 ng/L <6     HCG, Urine QL      Negative  Negative        Legend:  (H) High  ! Abnormal  (L) Low    Condition on Discharge:  improved    Vital Signs   /82 (BP Location: Left arm, Patient Position: Lying)   Pulse 83   Temp 98.2 °F (36.8 °C) (Oral)   Resp 18   Ht 157.5 cm (62\")   Wt 98.5 kg (217 lb 2.5 oz)   LMP 08/06/2024 (Exact Date)   SpO2 96%   Breastfeeding No   BMI 39.72 kg/m²       Discharge Disposition  Home or Self Care    Discharge Medications     Discharge Medications        Continue These Medications        Instructions Start Date   norethindrone-ethinyl estradiol-iron 1.5-30 MG-MCG tablet  Commonly known as: Microgestin FE 1.5/30   1 tablet, Oral, Daily      pantoprazole 40 MG EC tablet  Commonly known as: PROTONIX   40 mg, Oral, Daily      prenatal (CLASSIC) vitamin 28-0.8 MG tablet tablet  Generic drug: prenatal vitamin   1 tablet, Oral, Daily      SUMAtriptan 25 MG tablet  Commonly known as: IMITREX   25 mg, Oral, Once As Needed               Discharge Diet   Diet Instructions       Advance Diet As Tolerated -Target Diet: as tolerated      Target Diet: as tolerated            Activity at Discharge  Activity Instructions       Discharge Activity      1) No driving while taking narcotic pain medications (i.e. lortab, vicodin, percocet, hydrocodone, oxycodone)   2) Return to school / work in as tolerated.  3) May shower " after 2 days.  No tub soaks, submersion in water or baths for 2 weeks.  4) Do not lift / push / pull/ tug more than 20 lbs x 4 weeks.            Follow-up Appointments  Future Appointments   Date Time Provider Department Center   9/30/2024 12:00 PM [address]     Additional Instructions for the Follow-ups that You Need to Schedule       Discharge Follow-up with Specialty: Dr. Bear; 1 Week   As directed      Specialty: Dr. Bear   Follow Up: 1 Week                Test Results Pending at Discharge: pathology      Kristen Bear MD    Time: Discharge 10 min

## 2024-08-30 ENCOUNTER — OFFICE VISIT (OUTPATIENT)
Dept: NEUROLOGY | Facility: CLINIC | Age: 34
End: 2024-08-30
Payer: MEDICAID

## 2024-08-30 VITALS
TEMPERATURE: 97 F | WEIGHT: 212.8 LBS | BODY MASS INDEX: 39.16 KG/M2 | OXYGEN SATURATION: 100 % | RESPIRATION RATE: 14 BRPM | HEIGHT: 62 IN | HEART RATE: 86 BPM | DIASTOLIC BLOOD PRESSURE: 74 MMHG | SYSTOLIC BLOOD PRESSURE: 102 MMHG

## 2024-08-30 DIAGNOSIS — G43.909 ACUTE MIGRAINE: ICD-10-CM

## 2024-08-30 DIAGNOSIS — F40.240 CLAUSTROPHOBIA: ICD-10-CM

## 2024-08-30 DIAGNOSIS — G43.001 MIGRAINE WITHOUT AURA AND WITH STATUS MIGRAINOSUS, NOT INTRACTABLE: Primary | ICD-10-CM

## 2024-08-30 RX ORDER — RIZATRIPTAN BENZOATE 10 MG/1
10 TABLET ORAL ONCE AS NEEDED
Qty: 9 TABLET | Refills: 2 | Status: SHIPPED | OUTPATIENT
Start: 2024-08-30

## 2024-08-30 RX ORDER — RIMEGEPANT SULFATE 75 MG/75MG
75 TABLET, ORALLY DISINTEGRATING ORAL AS NEEDED
Qty: 2 TABLET | Refills: 0 | COMMUNITY
Start: 2024-08-30 | End: 2024-08-30

## 2024-08-30 RX ORDER — RIMEGEPANT SULFATE 75 MG/75MG
TABLET, ORALLY DISINTEGRATING ORAL
Qty: 30 TABLET | Status: CANCELLED | OUTPATIENT
Start: 2024-08-30

## 2024-08-30 RX ORDER — VENLAFAXINE 37.5 MG/1
37.5 TABLET ORAL DAILY
Qty: 30 TABLET | Refills: 2 | Status: SHIPPED | OUTPATIENT
Start: 2024-08-30

## 2024-08-30 RX ORDER — PERPHENAZINE 4 MG
TABLET ORAL
COMMUNITY

## 2024-08-30 RX ORDER — DIAZEPAM 5 MG
5 TABLET ORAL DAILY PRN
Qty: 2 TABLET | Refills: 0 | Status: SHIPPED | OUTPATIENT
Start: 2024-08-30

## 2024-08-30 NOTE — PROGRESS NOTES
New Patient Office Visit      Patient Name: Lorena Keen  : 1990   MRN: [id number]     Referring Physician: Marina Lundberg APRN    Chief Complaint:    Chief Complaint   Patient presents with    Establish Care     HA; patient has had HA since she was 18. Pt reports  HA days    Meds tried: topamax, maxalt, ibuprofen, naproxen.        History of Present Illness: Lorena Keen is a 34 y.o. female who is here today to establish care with Neurology.  She is a former pt of Neurology ([facility]) with last evaluation 04/15.  She was referred to us from PCP ([facility]) for migraine without aura.    MDM . Mixed throbbing and pulsating HA that usually begins unilaterally on the side of her head and will radiate and become the entire head. + light and sound sensitivity. + double vision. + N with no V. No aura. HA can last up to 96 hours. She likes to take Nsaids for these OTC and Excedrin and this will not terminate her migraine. She likes to sleep these off in a cold dark room when she can. Ice packs can help. Doesn't have time to sleep these off and needs treatment. She has been dealing with these HA since age 18. Triggers: Tension/stress, loud noises     Meds Tried and Failed: Topamax, Imitrex, Propanolol, Nsaids/Tylenol,     SOCIAL: Happily lives with Jensen Jain. Two children (ages 2 and 4 months). She is not breastfeeding. Current 1/2 PPD tobacco use. No vaping or smokeless tobacco. ETOH- socially. Denies issues with this No recreational drugs or cannabis.     [facility] Neuro: Mother- Migraines, Paternal Grandmother- Dementia, Maternal Grandfather- Tremor     Recent Imaging:     CT Head WO - noncontributory     Pertinent Medical History: GERD, IBS, depression, anxiety, OCD, thoracic myofascial strain, migraine, carpal tunnel syndrome, tobacco abuse    Subjective      Review of Systems:   Review of Systems   Constitutional: Negative.    HENT: Negative.     Eyes: Negative.    Respiratory:  Negative.     Cardiovascular: Negative.    Gastrointestinal: Negative.    Endocrine: Negative.    Genitourinary: Negative.    Musculoskeletal: Negative.    Skin: Negative.    Allergic/Immunologic: Negative.    Neurological:  Positive for headache.   Hematological: Negative.    Psychiatric/Behavioral: Negative.     All other systems reviewed and are negative.      Past Medical History:   Past Medical History:   Diagnosis Date    Acute calculous cholecystitis 2024    Anal fissure     Biliary colic 2024    Carpal tunnel syndrome 2016    Depression 2016    Gastroesophageal reflux disease 2016    Generalized anxiety disorder 2016    Intractable abdominal pain 2024    Irregular uterine bleeding     Metorrhagia.Pt states she had been spotting for approx 2 weeks, but this has stopped. PT usually has cycle every 3 months.     Irritable bowel syndrome 2016    Migraine 2016    Ovarian cyst     PMS (premenstrual syndrome)     Urinary tract infection     Varicella        Past Surgical History:   Past Surgical History:   Procedure Laterality Date     SECTION N/A 10/24/2022    Procedure:  SECTION PRIMARY;  Surgeon: Brenden Swan MD;  Location: [facility];  Service: Obstetrics/Gynecology;  Laterality: N/A;     SECTION N/A 5/10/2024    Procedure:  SECTION REPEAT;  Surgeon: Brenden Swan MD;  Location: [facility] OR;  Service: Obstetrics/Gynecology;  Laterality: N/A;    CHOLECYSTECTOMY N/A 2024    Procedure: CHOLECYSTECTOMY LAPAROSCOPIC WITH DAVINCI ROBOT;  Surgeon: Kristen Bear MD;  Location: [facility] OR;  Service: Robotics - DaVinci;  Laterality: N/A;       Family History:   Family History   Problem Relation Age of Onset    Hypertension Mother     Migraines Mother     Hypertension Father     Diabetes Father     Cancer Other        Social History:   Social History     Socioeconomic History    Marital status: Single  "  Tobacco Use    Smoking status: Some Days     Current packs/day: 0.50     Types: Cigarettes    Smokeless tobacco: Never    Tobacco comments:     1/27/2016: Former Smoker   Vaping Use    Vaping status: Never Used   Substance and Sexual Activity    Alcohol use: Yes     Comment: 6 drinks per week    Drug use: Not Currently     Types: Marijuana    Sexual activity: Yes     Partners: Male     Birth control/protection: Condom       Medications:     Current Outpatient Medications:     Collagen-Vitamin C-Biotin (Collagen 1500/C) 500-50-0.8 MG capsule, Take  by mouth., Disp: , Rfl:     multivitamin with minerals (MULTIVITAMIN ADULT PO), Take 1 tablet by mouth Daily., Disp: , Rfl:     norethindrone-ethinyl estradiol-iron (Microgestin FE 1.5/30) 1.5-30 MG-MCG tablet, Take 1 tablet by mouth Daily., Disp: 28 tablet, Rfl: 12    pantoprazole (PROTONIX) 40 MG EC tablet, Take 1 tablet by mouth Daily., Disp: , Rfl:     prenatal vitamin (prenatal, CLASSIC, vitamin) tablet, Take 1 tablet by mouth Daily., Disp: , Rfl:     diazePAM (Valium) 5 MG tablet, Take 1 tablet by mouth Daily As Needed for Anxiety or Sleep (Take 30 minutes prior to MRI)., Disp: 2 tablet, Rfl: 0    rizatriptan (Maxalt) 10 MG tablet, Take 1 tablet by mouth 1 (One) Time As Needed for Migraine. May repeat in 2 hours if needed, Disp: 9 tablet, Rfl: 2    venlafaxine (EFFEXOR) 37.5 MG tablet, Take 1 tablet by mouth Daily., Disp: 30 tablet, Rfl: 2    Allergies:   Allergies   Allergen Reactions    Fluoxetine Other (See Comments)     STATES \"THEY MAKE ME WANT TO KILL MYSELF\" WORSE DEPRESSION    Sulfa Antibiotics Hives     Sulfa Drugs    Trazodone Other (See Comments)     TraZODone HCl TABS  LOSS OF SHORT TERM MEMORY        Objective     Physical Exam:  Vital Signs:   Vitals:    08/30/24 0900   BP: 102/74   BP Location: Right arm   Patient Position: Sitting   Cuff Size: Adult   Pulse: 86   Resp: 14   Temp: 97 °F (36.1 °C)   TempSrc: Infrared   SpO2: 100%   Weight: 96.5 kg " "(212 lb 12.8 oz)   Height: 157.5 cm (62.01\")   PainSc: 0-No pain     Body mass index is 38.91 kg/m².     Physical Exam  Vitals and nursing note reviewed.   Constitutional:       General: She is not in acute distress.     Appearance: Normal appearance.   HENT:      Head: Normocephalic.      Nose: Nose normal.      Mouth/Throat:      Mouth: Mucous membranes are moist.      Pharynx: Oropharynx is clear.   Eyes:      Extraocular Movements: Extraocular movements intact.      Conjunctiva/sclera: Conjunctivae normal.   Musculoskeletal:      Cervical back: Normal range of motion and neck supple.   Skin:     General: Skin is warm and dry.      Capillary Refill: Capillary refill takes less than 2 seconds.   Neurological:      General: No focal deficit present.      Mental Status: She is alert and oriented to person, place, and time.      Cranial Nerves: Cranial nerves 2-12 are intact.      Coordination: Finger-Nose-Finger Test and Romberg Test normal.      Gait: Gait is intact.   Psychiatric:         Mood and Affect: Mood normal.         Speech: Speech normal.         Behavior: Behavior normal.         Neurologic Exam     Mental Status   Oriented to person, place, and time.   Speech: speech is normal   Level of consciousness: alert  Knowledge: good.     Cranial Nerves   Cranial nerves II through XII intact.     Motor Exam   Muscle bulk: normal  Overall muscle tone: normal  Right arm tone: normal  Left arm tone: normal  Right arm pronator drift: absent  Left arm pronator drift: absent  Right leg tone: normal  Left leg tone: normal    Strength   Right biceps: 5/5  Left biceps: 5/5  Right triceps: 5/5  Left triceps: 5/5  Right quadriceps: 5/5  Left quadriceps: 5/5  Right hamstrin/5  Left hamstrin/5    Sensory Exam   Light touch normal.     Gait, Coordination, and Reflexes     Gait  Gait: normal    Coordination   Romberg: negative  Finger to nose coordination: normal    Tremor   Resting tremor: absent    Reflexes   Right " Tsang: absent  Left Tsang: absent      PHQ-9 Total Score:       Assessment / Plan      Assessment/Plan:   Diagnoses and all orders for this visit:    1. Migraine without aura and with status migrainosus, not intractable (Primary)  -     venlafaxine (EFFEXOR) 37.5 MG tablet; Take 1 tablet by mouth Daily.  Dispense: 30 tablet; Refill: 2  -     MRI Brain With & Without Contrast; Future    2. Acute migraine  -     rizatriptan (Maxalt) 10 MG tablet; Take 1 tablet by mouth 1 (One) Time As Needed for Migraine. May repeat in 2 hours if needed  Dispense: 9 tablet; Refill: 2    3. Claustrophobia  -     diazePAM (Valium) 5 MG tablet; Take 1 tablet by mouth Daily As Needed for Anxiety or Sleep (Take 30 minutes prior to MRI).  Dispense: 2 tablet; Refill: 0         Follow Up:   Return in about 3 months (around 11/30/2024), or if symptoms worsen or fail to improve.    Anticipatory guidance and safety reviewed  Patient education provided  MIDAS - incomplete  DC Imitrex-failure  Begin Maxalt 10 mg as needed (abortive); side effects reviewed  Begin venlafaxine 37.5 mg daily (preventative); side effects reviewed  MRI brain with and without rule out demyelinating disease  Diazepam 5 mg take 30 minutes before MRI. Do not drive on medication.   Encouraged headache journaling for trigger identification and prevention    Return to care as needed or within 12 weeks or sooner with issues    FALGUNI Neves  [facility] Neurology and Sleep Medicine

## 2024-09-26 ENCOUNTER — HOSPITAL ENCOUNTER (OUTPATIENT)
Dept: MRI IMAGING | Facility: HOSPITAL | Age: 34
Discharge: HOME | End: 2024-09-26
Admitting: NURSE PRACTITIONER
Payer: MEDICAID

## 2024-09-26 DIAGNOSIS — G43.001 MIGRAINE WITHOUT AURA AND WITH STATUS MIGRAINOSUS, NOT INTRACTABLE: ICD-10-CM

## 2024-09-26 PROCEDURE — 70553 MRI BRAIN STEM W/O & W/DYE: CPT

## 2024-09-26 PROCEDURE — A9577 INJ MULTIHANCE: HCPCS | Performed by: NURSE PRACTITIONER

## 2024-09-26 PROCEDURE — 0 GADOBENATE DIMEGLUMINE 529 MG/ML SOLUTION: Performed by: NURSE PRACTITIONER

## 2024-09-26 RX ADMIN — GADOBENATE DIMEGLUMINE 15 ML: 529 INJECTION, SOLUTION INTRAVENOUS at 09:50

## 2024-10-04 ENCOUNTER — TELEPHONE (OUTPATIENT)
Dept: SURGERY | Facility: CLINIC | Age: 34
End: 2024-10-04
Payer: MEDICAID

## 2025-03-04 ENCOUNTER — TELEMEDICINE (OUTPATIENT)
Dept: NEUROLOGY | Facility: CLINIC | Age: 35
End: 2025-03-04
Payer: MEDICAID

## 2025-03-04 DIAGNOSIS — G43.909 ACUTE MIGRAINE: ICD-10-CM

## 2025-03-04 DIAGNOSIS — G43.001 MIGRAINE WITHOUT AURA AND WITH STATUS MIGRAINOSUS, NOT INTRACTABLE: Primary | ICD-10-CM

## 2025-03-04 PROCEDURE — 99214 OFFICE O/P EST MOD 30 MIN: CPT | Performed by: NURSE PRACTITIONER

## 2025-03-04 PROCEDURE — 1159F MED LIST DOCD IN RCRD: CPT | Performed by: NURSE PRACTITIONER

## 2025-03-04 PROCEDURE — 1160F RVW MEDS BY RX/DR IN RCRD: CPT | Performed by: NURSE PRACTITIONER

## 2025-03-04 RX ORDER — ATOGEPANT 60 MG/1
60 TABLET ORAL DAILY
Qty: 30 TABLET | Refills: 3 | Status: SHIPPED | OUTPATIENT
Start: 2025-03-04

## 2025-03-04 NOTE — PROGRESS NOTES
Follow Up Office Visit      Patient Name: Lorena Keen  : 1990   MRN: [id number]     Chief Complaint:    Chief Complaint   Patient presents with    Migraine       History of Present Illness: Lorena Keen is a 35 y.o. female who is here today to follow up with neurology for migraine HA. She was last seen in clinic  ([facility]). This is a telehealth encounter.     MDM 15/30. She was taking Venlafaxine 37.5 mg Daily and self DCd medication due insomnia- she only took for a few days. She has been using Maxalt 10 mg PRN as a rescue agent and this will terminate her migraines.     Taken from previous encounter:    Mixed throbbing and pulsating HA that usually begins unilaterally on the side of her head and will radiate and become the entire head. + light and sound sensitivity. + double vision. + N with no V. No aura. HA can last up to 96 hours. She likes to take Nsaids for these OTC and Excedrin and this will not terminate her migraine. She likes to sleep these off in a cold dark room when she can. Ice packs can help. Doesn't have time to sleep these off and needs treatment. She has been dealing with these HA since age 18. Triggers: Tension/stress, loud noises. Meds Tried and Failed: Topamax, Imitrex, Propanolol, Nsaids/Tylenol, Venlafaxine     Recent Imaging:      MRI Brain W/WO  - noncontributory   CT Head WO - noncontributory      Pertinent Medical History: GERD, IBS, depression, anxiety, OCD, thoracic myofascial strain, migraine, carpal tunnel syndrome, tobacco abuse    Subjective      Review of Systems:   Review of Systems   Constitutional: Negative.    HENT: Negative.     Eyes: Negative.    Respiratory: Negative.     Cardiovascular: Negative.    Gastrointestinal: Negative.    Endocrine: Negative.    Genitourinary: Negative.    Musculoskeletal: Negative.    Skin: Negative.    Allergic/Immunologic: Negative.    Neurological:  Positive for headache.   Hematological: Negative.   "  Psychiatric/Behavioral: Negative.     All other systems reviewed and are negative.      I have reviewed and the following portions of the patient's history were updated as appropriate: past family history, past medical history, past social history, past surgical history and problem list.    Medications:     Current Outpatient Medications:     rizatriptan (Maxalt) 10 MG tablet, Take 1 tablet by mouth 1 (One) Time As Needed for Migraine. May repeat in 2 hours if needed, Disp: 9 tablet, Rfl: 2    Atogepant (Qulipta) 60 MG tablet, Take 1 tablet by mouth Daily., Disp: 30 tablet, Rfl: 3    Collagen-Vitamin C-Biotin (Collagen 1500/C) 500-50-0.8 MG capsule, Take  by mouth., Disp: , Rfl:     multivitamin with minerals (MULTIVITAMIN ADULT PO), Take 1 tablet by mouth Daily., Disp: , Rfl:     norethindrone-ethinyl estradiol-iron (Microgestin FE 1.5/30) 1.5-30 MG-MCG tablet, Take 1 tablet by mouth Daily., Disp: 28 tablet, Rfl: 12    pantoprazole (PROTONIX) 40 MG EC tablet, Take 1 tablet by mouth Daily., Disp: , Rfl:     prenatal vitamin (prenatal, CLASSIC, vitamin) tablet, Take 1 tablet by mouth Daily., Disp: , Rfl:     Allergies:   Allergies   Allergen Reactions    Fluoxetine Other (See Comments)     STATES \"THEY MAKE ME WANT TO KILL MYSELF\" WORSE DEPRESSION    Sulfa Antibiotics Hives     Sulfa Drugs    Trazodone Other (See Comments)     TraZODone HCl TABS  LOSS OF SHORT TERM MEMORY        Objective     Physical Exam:  Vital Signs: There were no vitals filed for this visit.  There is no height or weight on file to calculate BMI.    Physical Exam  Vitals and nursing note reviewed.   Constitutional:       General: She is not in acute distress.     Appearance: Normal appearance.   HENT:      Head: Normocephalic.      Nose: Nose normal.      Mouth/Throat:      Mouth: Mucous membranes are moist.      Pharynx: Oropharynx is clear.   Eyes:      Conjunctiva/sclera: Conjunctivae normal.   Musculoskeletal:      Cervical back: Normal " range of motion.   Neurological:      Mental Status: She is oriented to person, place, and time.   Psychiatric:         Mood and Affect: Mood normal.         Behavior: Behavior normal.         Neurological Exam  Mental Status  Awake, alert and oriented to person, place and time. Oriented to person, place, and time.       Assessment / Plan      Assessment/Plan:   Diagnoses and all orders for this visit:    1. Migraine without aura and with status migrainosus, not intractable (Primary)  -     Atogepant (Qulipta) 60 MG tablet; Take 1 tablet by mouth Daily.  Dispense: 30 tablet; Refill: 3    2. Acute migraine  -     rizatriptan (Maxalt) 10 MG tablet; Take 1 tablet by mouth 1 (One) Time As Needed for Migraine. May repeat in 2 hours if needed  Dispense: 9 tablet; Refill: 2         Follow Up:   Return in about 3 months (around 6/4/2025), or if symptoms worsen or fail to improve.    Anticipatory guidance and safety reviewed  Patient education provided  Continue Maxalt 10 mg as needed (abortive); side effects reviewed  DC Venlafaxine  DC Valium  Begin Qulipta 60 mg Daily (prevention); SE reviewed  Encouraged headache journaling for trigger identification and prevention     Return to care as needed or within 12 weeks or sooner with issues    [facility] Encounter for 21 min with > 50% of encounter spent counseling and coordinating care     Arcelia Granados, ADI, APRN, FNP-C  [facility] Neurology and Sleep Medicine

## 2025-03-05 RX ORDER — RIZATRIPTAN BENZOATE 10 MG/1
10 TABLET ORAL ONCE AS NEEDED
Qty: 9 TABLET | Refills: 2 | Status: SHIPPED | OUTPATIENT
Start: 2025-03-05

## 2025-05-07 RX ORDER — NORETHINDRONE ACETATE AND ETHINYL ESTRADIOL 1.5-30(21)
1 KIT ORAL DAILY
Qty: 28 TABLET | Refills: 2 | Status: SHIPPED | OUTPATIENT
Start: 2025-05-07 | End: 2026-05-07

## (undated) DEVICE — SUCTION IRRIGATOR: Brand: ENDOWRIST

## (undated) DEVICE — LARGE, DISPOSABLE ALEXIS O C-SECTION PROTECTOR - RETRACTOR: Brand: ALEXIS ® O C-SECTION PROTECTOR - RETRACTOR

## (undated) DEVICE — TOWEL,OR,DSP,ST,NATURAL,DLX,4/PK,20PK/CS: Brand: MEDLINE

## (undated) DEVICE — INSUFFLATION NEEDLE TO ESTABLISH PNEUMOPERITONEUM.: Brand: INSUFFLATION NEEDLE

## (undated) DEVICE — SUT GUT CHRM 1 CTX 36IN 905H

## (undated) DEVICE — TBG PENCL TELESCP MEGADYNE SMOKE EVAC 10FT

## (undated) DEVICE — BLADELESS OBTURATOR: Brand: WECK VISTA

## (undated) DEVICE — SUT GUT CHRM 2/0 SH 27IN G123H

## (undated) DEVICE — SLV SCD CALF HEMOFORCE DVT THERP REPROC MD

## (undated) DEVICE — RICH C-SECTION: Brand: MEDLINE INDUSTRIES, INC.

## (undated) DEVICE — STERILE BABY BLANKET W/CSR: Brand: MEDLINE

## (undated) DEVICE — SOL IRR NACL 0.9PCT BT 1000ML

## (undated) DEVICE — SYR SLPTP 30CC

## (undated) DEVICE — ANTIBACTERIAL UNDYED BRAIDED (POLYGLACTIN 910), SYNTHETIC ABSORBABLE SUTURE: Brand: COATED VICRYL

## (undated) DEVICE — ADHS SKIN PREMIERPRO EXOFIN TOPICAL HI/VISC .5ML

## (undated) DEVICE — SUT VICRYL 3/0 CT1 27IN J258H

## (undated) DEVICE — ANCHOR TISSUE RETRIEVAL SYSTEM, BAG SIZE 125 ML, PORT SIZE 8 MM: Brand: ANCHOR TISSUE RETRIEVAL SYSTEM

## (undated) DEVICE — CANNULA SEAL

## (undated) DEVICE — GOWN AURORA POLY REINF XL: Brand: MEDLINE

## (undated) DEVICE — CLNSR INST PREKLENZ SOAK/SHLD 6ML MD

## (undated) DEVICE — RICH GENERAL LAPAROSCOPY: Brand: MEDLINE INDUSTRIES, INC.

## (undated) DEVICE — STRIP,CLOSURE,WOUND,MEDI-STRIP,1/2X4: Brand: MEDLINE

## (undated) DEVICE — SUT PDS 0 CT 36IN DYED Z358T

## (undated) DEVICE — CUP EXTRCT VAC

## (undated) DEVICE — HDRST POSTN SLOTTED A/

## (undated) DEVICE — ADHS LIQ MASTISOL 2/3ML

## (undated) DEVICE — GOWN,PREVENTION PLUS,XLARGE,STERILE: Brand: MEDLINE

## (undated) DEVICE — GLV SURG SENSICARE POLYISPRN W/ALOE PF LF 6 GRN STRL

## (undated) DEVICE — MARKER,SKIN,WI/RULER AND LABELS: Brand: MEDLINE

## (undated) DEVICE — SUT VIC 0 UR6 27IN VCP603H

## (undated) DEVICE — ST TBG PNEUMOCLEAR EVAC SMOKE HIFLO

## (undated) DEVICE — SYR LL TP 10ML STRL

## (undated) DEVICE — REDUCER: Brand: ENDOWRIST

## (undated) DEVICE — GLV SURG BIOGEL M LTX PF 8

## (undated) DEVICE — COLUMN DRAPE

## (undated) DEVICE — SOL IRR NACL 0.9PCT 1000ML

## (undated) DEVICE — SUT VIC 4/0 KS 27IN VCP662H

## (undated) DEVICE — ARM DRAPE

## (undated) DEVICE — HYPODERMIC SAFETY NEEDLE: Brand: MONOJECT

## (undated) DEVICE — GLV SURG SENSICARE W/ALOE PF LF 8.5 STRL

## (undated) DEVICE — PATIENT RETURN ELECTRODE, SINGLE-USE, CONTACT QUALITY MONITORING, ADULT, WITH 9FT CORD, FOR PATIENTS WEIGING OVER 33LBS. (15KG): Brand: MEGADYNE

## (undated) DEVICE — GLV SURG ULTRATOUCH BIOGEL/COAT PF LF SZ6 STRL